# Patient Record
Sex: FEMALE | Race: WHITE | Employment: OTHER
[De-identification: names, ages, dates, MRNs, and addresses within clinical notes are randomized per-mention and may not be internally consistent; named-entity substitution may affect disease eponyms.]

---

## 2017-03-02 PROBLEM — Z79.899 MEDICATION MANAGEMENT: Status: ACTIVE | Noted: 2017-03-02

## 2017-03-02 PROBLEM — F34.1 DYSTHYMIA: Status: ACTIVE | Noted: 2017-03-02

## 2017-03-09 ENCOUNTER — OFFICE VISIT (OUTPATIENT)
Dept: ORTHOPEDIC SURGERY | Facility: CLINIC | Age: 72
End: 2017-03-09

## 2017-03-09 DIAGNOSIS — S32.000D COMPRESSION FX, LUMBAR SPINE, WITH ROUTINE HEALING, SUBSEQUENT ENCOUNTER: Primary | ICD-10-CM

## 2017-03-09 DIAGNOSIS — M81.0 OSTEOPOROSIS: ICD-10-CM

## 2017-03-09 PROCEDURE — 99213 OFFICE O/P EST LOW 20 MIN: CPT | Performed by: ORTHOPAEDIC SURGERY

## 2017-04-06 ENCOUNTER — HOSPITAL ENCOUNTER (INPATIENT)
Age: 72
LOS: 3 days | Discharge: HOME HEALTH | DRG: 378 | End: 2017-04-10
Attending: FAMILY MEDICINE | Admitting: FAMILY MEDICINE
Payer: COMMERCIAL

## 2017-04-06 LAB
ABO/RH: NORMAL
ALT SERPL-CCNC: 11 U/L (ref 5–33)
ANION GAP SERPL CALCULATED.3IONS-SCNC: 17 MMOL/L (ref 9–17)
ANTIBODY SCREEN: NEGATIVE
ARM BAND NUMBER: NORMAL
AST SERPL-CCNC: 19 U/L
BLOOD BANK COMMENT: NORMAL
BUN BLDV-MCNC: 10 MG/DL (ref 8–23)
BUN/CREAT BLD: ABNORMAL (ref 9–20)
C DIFFICILE TOXINS, PCR: NORMAL
CALCIUM SERPL-MCNC: 9.4 MG/DL (ref 8.6–10.4)
CHLORIDE BLD-SCNC: 89 MMOL/L (ref 98–107)
CO2: 23 MMOL/L (ref 20–31)
CREAT SERPL-MCNC: 0.51 MG/DL (ref 0.5–0.9)
DATE, STOOL #1: ABNORMAL
DATE, STOOL #2: ABNORMAL
DATE, STOOL #3: ABNORMAL
EXPIRATION DATE: NORMAL
GFR AFRICAN AMERICAN: >60 ML/MIN
GFR NON-AFRICAN AMERICAN: >60 ML/MIN
GFR SERPL CREATININE-BSD FRML MDRD: ABNORMAL ML/MIN/{1.73_M2}
GFR SERPL CREATININE-BSD FRML MDRD: ABNORMAL ML/MIN/{1.73_M2}
GLUCOSE BLD-MCNC: 207 MG/DL (ref 70–99)
GLUCOSE BLD-MCNC: 99 MG/DL (ref 65–105)
HCT VFR BLD CALC: 36.7 % (ref 36–46)
HEMOCCULT SP1 STL QL: POSITIVE
HEMOCCULT SP2 STL QL: ABNORMAL
HEMOCCULT SP3 STL QL: ABNORMAL
HEMOGLOBIN: 12.2 G/DL (ref 12–16)
MCH RBC QN AUTO: 35.1 PG (ref 26–34)
MCHC RBC AUTO-ENTMCNC: 33.3 G/DL (ref 31–37)
MCV RBC AUTO: 105.3 FL (ref 80–100)
PDW BLD-RTO: 13.8 % (ref 11.5–14.9)
PLATELET # BLD: 425 K/UL (ref 150–450)
PMV BLD AUTO: 6.9 FL (ref 6–12)
POTASSIUM SERPL-SCNC: 3.6 MMOL/L (ref 3.7–5.3)
RBC # BLD: 3.48 M/UL (ref 4–5.2)
SODIUM BLD-SCNC: 129 MMOL/L (ref 135–144)
SPECIMEN DESCRIPTION: NORMAL
TIME, STOOL #1: 1516
TIME, STOOL #2: ABNORMAL
TIME, STOOL #3: ABNORMAL
WBC # BLD: 8.6 K/UL (ref 3.5–11)

## 2017-04-06 PROCEDURE — 85027 COMPLETE CBC AUTOMATED: CPT

## 2017-04-06 PROCEDURE — 85018 HEMOGLOBIN: CPT

## 2017-04-06 PROCEDURE — 86850 RBC ANTIBODY SCREEN: CPT

## 2017-04-06 PROCEDURE — 96361 HYDRATE IV INFUSION ADD-ON: CPT

## 2017-04-06 PROCEDURE — 36415 COLL VENOUS BLD VENIPUNCTURE: CPT

## 2017-04-06 PROCEDURE — 96375 TX/PRO/DX INJ NEW DRUG ADDON: CPT

## 2017-04-06 PROCEDURE — 86900 BLOOD TYPING SEROLOGIC ABO: CPT

## 2017-04-06 PROCEDURE — 96365 THER/PROPH/DIAG IV INF INIT: CPT

## 2017-04-06 PROCEDURE — 2580000003 HC RX 258: Performed by: FAMILY MEDICINE

## 2017-04-06 PROCEDURE — 84450 TRANSFERASE (AST) (SGOT): CPT

## 2017-04-06 PROCEDURE — G0378 HOSPITAL OBSERVATION PER HR: HCPCS

## 2017-04-06 PROCEDURE — G0328 FECAL BLOOD SCRN IMMUNOASSAY: HCPCS

## 2017-04-06 PROCEDURE — 84460 ALANINE AMINO (ALT) (SGPT): CPT

## 2017-04-06 PROCEDURE — C9113 INJ PANTOPRAZOLE SODIUM, VIA: HCPCS | Performed by: FAMILY MEDICINE

## 2017-04-06 PROCEDURE — 87493 C DIFF AMPLIFIED PROBE: CPT

## 2017-04-06 PROCEDURE — 82947 ASSAY GLUCOSE BLOOD QUANT: CPT

## 2017-04-06 PROCEDURE — 80048 BASIC METABOLIC PNL TOTAL CA: CPT

## 2017-04-06 PROCEDURE — 6370000000 HC RX 637 (ALT 250 FOR IP): Performed by: FAMILY MEDICINE

## 2017-04-06 PROCEDURE — 6360000002 HC RX W HCPCS: Performed by: FAMILY MEDICINE

## 2017-04-06 PROCEDURE — 96376 TX/PRO/DX INJ SAME DRUG ADON: CPT

## 2017-04-06 PROCEDURE — 85014 HEMATOCRIT: CPT

## 2017-04-06 PROCEDURE — 96366 THER/PROPH/DIAG IV INF ADDON: CPT

## 2017-04-06 PROCEDURE — 86901 BLOOD TYPING SEROLOGIC RH(D): CPT

## 2017-04-06 PROCEDURE — G0379 DIRECT REFER HOSPITAL OBSERV: HCPCS

## 2017-04-06 RX ORDER — SODIUM CHLORIDE 9 MG/ML
INJECTION, SOLUTION INTRAVENOUS CONTINUOUS
Status: DISCONTINUED | OUTPATIENT
Start: 2017-04-06 | End: 2017-04-10 | Stop reason: HOSPADM

## 2017-04-06 RX ORDER — HYDROCODONE BITARTRATE AND ACETAMINOPHEN 5; 325 MG/1; MG/1
1 TABLET ORAL EVERY 4 HOURS PRN
Status: DISCONTINUED | OUTPATIENT
Start: 2017-04-06 | End: 2017-04-10 | Stop reason: HOSPADM

## 2017-04-06 RX ORDER — POTASSIUM CHLORIDE 7.45 MG/ML
10 INJECTION INTRAVENOUS
Status: COMPLETED | OUTPATIENT
Start: 2017-04-06 | End: 2017-04-06

## 2017-04-06 RX ORDER — CLONAZEPAM 0.5 MG/1
0.5 TABLET ORAL 3 TIMES DAILY
Status: DISCONTINUED | OUTPATIENT
Start: 2017-04-06 | End: 2017-04-10 | Stop reason: HOSPADM

## 2017-04-06 RX ORDER — PROPRANOLOL HYDROCHLORIDE 10 MG/1
10 TABLET ORAL DAILY
Status: DISCONTINUED | OUTPATIENT
Start: 2017-04-06 | End: 2017-04-10 | Stop reason: HOSPADM

## 2017-04-06 RX ORDER — 0.9 % SODIUM CHLORIDE 0.9 %
10 VIAL (ML) INJECTION DAILY
Status: DISCONTINUED | OUTPATIENT
Start: 2017-04-06 | End: 2017-04-07 | Stop reason: ALTCHOICE

## 2017-04-06 RX ORDER — PANTOPRAZOLE SODIUM 40 MG/10ML
40 INJECTION, POWDER, LYOPHILIZED, FOR SOLUTION INTRAVENOUS DAILY
Status: DISCONTINUED | OUTPATIENT
Start: 2017-04-06 | End: 2017-04-07 | Stop reason: ALTCHOICE

## 2017-04-06 RX ORDER — LEVOTHYROXINE SODIUM 0.05 MG/1
50 TABLET ORAL DAILY
Status: DISCONTINUED | OUTPATIENT
Start: 2017-04-06 | End: 2017-04-10 | Stop reason: HOSPADM

## 2017-04-06 RX ORDER — CITALOPRAM 40 MG/1
40 TABLET ORAL DAILY
Status: DISCONTINUED | OUTPATIENT
Start: 2017-04-06 | End: 2017-04-10 | Stop reason: HOSPADM

## 2017-04-06 RX ORDER — PANTOPRAZOLE SODIUM 40 MG/1
40 TABLET, DELAYED RELEASE ORAL
Status: DISCONTINUED | OUTPATIENT
Start: 2017-04-07 | End: 2017-04-06 | Stop reason: SDUPTHER

## 2017-04-06 RX ORDER — DEXTROSE AND SODIUM CHLORIDE 5; .9 G/100ML; G/100ML
INJECTION, SOLUTION INTRAVENOUS CONTINUOUS
Status: DISCONTINUED | OUTPATIENT
Start: 2017-04-06 | End: 2017-04-06

## 2017-04-06 RX ADMIN — POTASSIUM CHLORIDE 10 MEQ: 7.46 INJECTION, SOLUTION INTRAVENOUS at 20:16

## 2017-04-06 RX ADMIN — SODIUM CHLORIDE: 9 INJECTION, SOLUTION INTRAVENOUS at 18:42

## 2017-04-06 RX ADMIN — POTASSIUM CHLORIDE 10 MEQ: 7.46 INJECTION, SOLUTION INTRAVENOUS at 22:01

## 2017-04-06 RX ADMIN — DEXTROSE AND SODIUM CHLORIDE: 5; 900 INJECTION, SOLUTION INTRAVENOUS at 14:01

## 2017-04-06 RX ADMIN — PANTOPRAZOLE SODIUM 40 MG: 40 INJECTION, POWDER, FOR SOLUTION INTRAVENOUS at 22:02

## 2017-04-06 RX ADMIN — SODIUM CHLORIDE 10 ML: 9 INJECTION INTRAMUSCULAR; INTRAVENOUS; SUBCUTANEOUS at 22:02

## 2017-04-06 RX ADMIN — CLONAZEPAM 0.5 MG: 0.5 TABLET ORAL at 22:02

## 2017-04-06 RX ADMIN — HYDROCODONE BITARTRATE AND ACETAMINOPHEN 1 TABLET: 5; 325 TABLET ORAL at 15:10

## 2017-04-06 RX ADMIN — PROPRANOLOL HYDROCHLORIDE 10 MG: 10 TABLET ORAL at 20:17

## 2017-04-06 ASSESSMENT — PAIN DESCRIPTION - PROGRESSION
CLINICAL_PROGRESSION: NOT CHANGED

## 2017-04-06 ASSESSMENT — PAIN DESCRIPTION - FREQUENCY: FREQUENCY: CONTINUOUS

## 2017-04-06 ASSESSMENT — PAIN DESCRIPTION - DESCRIPTORS: DESCRIPTORS: ACHING

## 2017-04-06 ASSESSMENT — PAIN DESCRIPTION - ONSET: ONSET: ON-GOING

## 2017-04-06 ASSESSMENT — PAIN DESCRIPTION - ORIENTATION: ORIENTATION: LOWER;MID

## 2017-04-06 ASSESSMENT — PAIN DESCRIPTION - LOCATION: LOCATION: ABDOMEN;BACK

## 2017-04-06 ASSESSMENT — PAIN DESCRIPTION - PAIN TYPE: TYPE: CHRONIC PAIN;ACUTE PAIN

## 2017-04-06 ASSESSMENT — PAIN SCALES - GENERAL
PAINLEVEL_OUTOF10: 9
PAINLEVEL_OUTOF10: 7
PAINLEVEL_OUTOF10: 9

## 2017-04-06 NOTE — IP AVS SNAPSHOT
medications prescribed for you. Read the directions carefully, and ask your doctor or other care provider to review them with you.       ASK your doctor about these medications     Lift Chair Misc   by Does not apply route

## 2017-04-06 NOTE — IP AVS SNAPSHOT
Commonly known as:  PROVENTIL HFA   Inhale 1-2 puffs into the lungs every 4 hours as needed for Wheezing or Shortness of Breath (Space out to every 6 hours as symptoms improve) Space out to every 6 hours as symptoms improve. As needed                        citalopram 40 MG tablet   Commonly known as:  CELEXA   TAKE ONE TABLET BY MOUTH EVERY MORNING                40 mg on 4/10/2017  2:12 PM     Tomorrow morning                          clonazePAM 0.5 MG tablet   Commonly known as:  KLONOPIN   Take 1 tablet by mouth 3 times daily                0.5 mg on 4/9/2017 10:16 PM     Resume home regimen                         folic acid 1 MG tablet   Commonly known as:  FOLVITE   Take 1 mg by mouth daily                  Tomorrow morning                          levothyroxine 50 MCG tablet   Commonly known as:  SYNTHROID   TAKE ONE TABLET BY MOUTH DAILY                50 mcg on 4/9/2017  6:11 AM     Tomorrow morning                          nicotine 21 MG/24HR   Commonly known as:  NICODERM CQ   Place 1 patch onto the skin every 24 hours                  Tomorrow morning                          nicotine 14 MG/24HR   Commonly known as:  NICODERM CQ   Place 1 patch onto the skin every 24 hours                  Tomorrow morning                          nicotine 7 MG/24HR   Commonly known as:  NICODERM CQ   Place 1 patch onto the skin every 24 hours   Notes to Patient:  Always remove previous patch first and rotate sites.                   Tomorrow morning                          propranolol 10 MG tablet   Commonly known as:  INDERAL   TAKE ONE TABLET BY MOUTH DAILY                10 mg on 4/10/2017  7:23 AM     Tomorrow morning                          vitamin B-12 1000 MCG tablet   Commonly known as:  CYANOCOBALAMIN   Take 1,000 mcg by mouth daily                  Tomorrow morning                          Vitamin D 1000 UNITS Caps capsule   Commonly known as:  CHOLECALCIFEROL You may receive a survey regarding the care you received during your stay. Your input is valuable to us. We encourage you to complete and return your survey in the envelope provided. We hope you will choose us in the future for your healthcare needs. Patient Information     Patient Name DAYA Olivarez 1945      Care Provided at:     Name Address Phone       Jasen Peterson  [address] [phone]            Your Visit    Here you will find information about your visit, including the reason for your visit. Please take this sheet with you when you visit your doctor or other health care provider in the future. It will help determine the best possible medical care for you at that time. If you have any questions once you leave the hospital, please call the department phone number listed below. Why you were here     Your primary diagnosis was:  Hematochezia    Your diagnoses also included:  Diarrhea, Dehydration, Anemia Associated With Acute Blood Loss, Hyponatremia, Hypokalemia      Visit Information     Date & Time Provider Department Dept. Phone    2017 Yris Allen MD [facility] [phone]       Follow-up Appointments    Below is a list of your follow-up and future appointments. This may not be a complete list as you may have made appointments directly with providers that we are not aware of or your providers may have made some for you. Please call your providers to confirm appointments. It is important to keep your appointments. Please bring your current insurance card, photo ID, co-pay, and all medication bottles to your appointment. If self-pay, payment is expected at the time of service. Follow-up Information     Follow up with Yris Allen MD. Schedule an appointment as soon as possible for a visit in 1 week. Specialty:  Family Medicine    Contact information:    [address]   [address] Other Treatments: skilled assessment, medication education, RESUME ALL PREVIOUS SERVICES, RESUME ALL [facility] CARE    Patient's personal belongings (please select all that are sent with patient):  Glasses    RN SIGNATURE:  Electronically signed by Mecca Miguel RN on 4/8/17 at 10:37 AM    PHYSICIAN SECTION    Prognosis: Fair    Condition at Discharge: Stable    Rehab Potential (if transferring to Rehab): Fair    Recommended Labs or Other Treatments After Discharge: see above    Physician Certification: I certify the above information and transfer of Rhonda Haro  is necessary for the continuing treatment of the diagnosis listed and that she requires Home Care for less 30 days. Update Admission H&P: No change in H&P    PHYSICIAN SIGNATURE:  Electronically signed by Nata Interiano RN on 4/10/17 at 2:49 PM    CASE MANAGEMENT/SOCIAL WORK SECTION    Inpatient Status Date: 4/7/17    [facility] Readmission Risk Assessment Score:  Risk Score: 24   (Score > 14= high risk for readmission)    Discharging to Facility/ [address]  Phone: [phone]  · Fax: [phone]    / signature: Electronically signed by Dennie Evener, RN on 4/10/17 at 3:18 PM      Diet Instructions     ? Good nutrition is important when healing from an illness, injury, or surgery. Follow any nutrition recommendations given to you during your hospital stay. ? If you were given an oral nutrition supplement while in the hospital, continue to take this supplement at home. You can take it with meals, in-between meals, and/or before bedtime. These supplements can be purchased at most local grocery stores, pharmacies, and chain [employer]-stores. ? If you have any questions about your diet or nutrition, call the hospital and ask for the dietitian.     General Diet           Activity Instructions     Activity as tolerated            Important information for a smoker

## 2017-04-06 NOTE — PLAN OF CARE
Problem: Falls - Risk of  Goal: Absence of falls  Outcome: Met This Shift  Intervention completed as charted. Pt remains injury free and free of falls. Pt uses call light correctly. Pt has a steady gait    Problem: Risk for Impaired Skin Integrity  Goal: Tissue integrity - skin and mucous membranes  Structural intactness and normal physiological function of skin and  mucous membranes.    Outcome: Met This Shift

## 2017-04-07 PROBLEM — E87.1 HYPONATREMIA: Status: ACTIVE | Noted: 2017-04-07

## 2017-04-07 PROBLEM — R19.7 DIARRHEA: Status: ACTIVE | Noted: 2017-04-07

## 2017-04-07 PROBLEM — D62 ANEMIA ASSOCIATED WITH ACUTE BLOOD LOSS: Status: ACTIVE | Noted: 2017-04-07

## 2017-04-07 PROBLEM — E87.6 HYPOKALEMIA: Status: ACTIVE | Noted: 2017-04-07

## 2017-04-07 PROBLEM — K92.1 HEMATOCHEZIA: Chronic | Status: ACTIVE | Noted: 2017-04-07

## 2017-04-07 PROBLEM — E86.0 DEHYDRATION: Status: ACTIVE | Noted: 2017-04-07

## 2017-04-07 LAB
ANION GAP SERPL CALCULATED.3IONS-SCNC: 13 MMOL/L (ref 9–17)
CHLORIDE BLD-SCNC: 96 MMOL/L (ref 98–107)
CO2: 21 MMOL/L (ref 20–31)
GLUCOSE BLD-MCNC: 135 MG/DL (ref 65–105)
GLUCOSE BLD-MCNC: 89 MG/DL (ref 65–105)
GLUCOSE BLD-MCNC: 90 MG/DL (ref 65–105)
HCT VFR BLD CALC: 29.8 % (ref 36–46)
HCT VFR BLD CALC: 30.2 % (ref 36–46)
HCT VFR BLD CALC: 31.1 % (ref 36–46)
HCT VFR BLD CALC: 31.4 % (ref 36–46)
HEMOGLOBIN: 10.1 G/DL (ref 12–16)
HEMOGLOBIN: 10.2 G/DL (ref 12–16)
HEMOGLOBIN: 10.4 G/DL (ref 12–16)
HEMOGLOBIN: 10.6 G/DL (ref 12–16)
POTASSIUM SERPL-SCNC: 3.9 MMOL/L (ref 3.7–5.3)
SODIUM BLD-SCNC: 130 MMOL/L (ref 135–144)

## 2017-04-07 PROCEDURE — C9113 INJ PANTOPRAZOLE SODIUM, VIA: HCPCS | Performed by: FAMILY MEDICINE

## 2017-04-07 PROCEDURE — 6370000000 HC RX 637 (ALT 250 FOR IP): Performed by: FAMILY MEDICINE

## 2017-04-07 PROCEDURE — 2580000003 HC RX 258: Performed by: FAMILY MEDICINE

## 2017-04-07 PROCEDURE — G8979 MOBILITY GOAL STATUS: HCPCS

## 2017-04-07 PROCEDURE — G8978 MOBILITY CURRENT STATUS: HCPCS

## 2017-04-07 PROCEDURE — 85018 HEMOGLOBIN: CPT

## 2017-04-07 PROCEDURE — 82947 ASSAY GLUCOSE BLOOD QUANT: CPT

## 2017-04-07 PROCEDURE — 1200000000 HC SEMI PRIVATE

## 2017-04-07 PROCEDURE — 97161 PT EVAL LOW COMPLEX 20 MIN: CPT

## 2017-04-07 PROCEDURE — G8987 SELF CARE CURRENT STATUS: HCPCS

## 2017-04-07 PROCEDURE — 99222 1ST HOSP IP/OBS MODERATE 55: CPT | Performed by: FAMILY MEDICINE

## 2017-04-07 PROCEDURE — 36415 COLL VENOUS BLD VENIPUNCTURE: CPT

## 2017-04-07 PROCEDURE — 97166 OT EVAL MOD COMPLEX 45 MIN: CPT

## 2017-04-07 PROCEDURE — 97110 THERAPEUTIC EXERCISES: CPT

## 2017-04-07 PROCEDURE — G8988 SELF CARE GOAL STATUS: HCPCS

## 2017-04-07 PROCEDURE — 80051 ELECTROLYTE PANEL: CPT

## 2017-04-07 PROCEDURE — 96361 HYDRATE IV INFUSION ADD-ON: CPT

## 2017-04-07 PROCEDURE — 6360000002 HC RX W HCPCS: Performed by: FAMILY MEDICINE

## 2017-04-07 PROCEDURE — 97535 SELF CARE MNGMENT TRAINING: CPT

## 2017-04-07 PROCEDURE — 85014 HEMATOCRIT: CPT

## 2017-04-07 PROCEDURE — 96376 TX/PRO/DX INJ SAME DRUG ADON: CPT

## 2017-04-07 PROCEDURE — 99222 1ST HOSP IP/OBS MODERATE 55: CPT | Performed by: INTERNAL MEDICINE

## 2017-04-07 PROCEDURE — G0378 HOSPITAL OBSERVATION PER HR: HCPCS

## 2017-04-07 RX ORDER — PANTOPRAZOLE SODIUM 40 MG/1
40 TABLET, DELAYED RELEASE ORAL
Status: DISCONTINUED | OUTPATIENT
Start: 2017-04-08 | End: 2017-04-10 | Stop reason: HOSPADM

## 2017-04-07 RX ADMIN — CLONAZEPAM 0.5 MG: 0.5 TABLET ORAL at 21:23

## 2017-04-07 RX ADMIN — HYDROCODONE BITARTRATE AND ACETAMINOPHEN 1 TABLET: 5; 325 TABLET ORAL at 05:40

## 2017-04-07 RX ADMIN — PROPRANOLOL HYDROCHLORIDE 10 MG: 10 TABLET ORAL at 09:34

## 2017-04-07 RX ADMIN — PANTOPRAZOLE SODIUM 40 MG: 40 INJECTION, POWDER, FOR SOLUTION INTRAVENOUS at 14:59

## 2017-04-07 RX ADMIN — SODIUM CHLORIDE 10 ML: 9 INJECTION INTRAMUSCULAR; INTRAVENOUS; SUBCUTANEOUS at 14:59

## 2017-04-07 RX ADMIN — SODIUM CHLORIDE: 9 INJECTION, SOLUTION INTRAVENOUS at 23:06

## 2017-04-07 RX ADMIN — CITALOPRAM 40 MG: 40 TABLET ORAL at 09:34

## 2017-04-07 RX ADMIN — CLONAZEPAM 0.5 MG: 0.5 TABLET ORAL at 09:34

## 2017-04-07 RX ADMIN — SODIUM CHLORIDE: 9 INJECTION, SOLUTION INTRAVENOUS at 14:59

## 2017-04-07 RX ADMIN — HYDROCODONE BITARTRATE AND ACETAMINOPHEN 1 TABLET: 5; 325 TABLET ORAL at 14:59

## 2017-04-07 RX ADMIN — CLONAZEPAM 0.5 MG: 0.5 TABLET ORAL at 16:50

## 2017-04-07 RX ADMIN — LEVOTHYROXINE SODIUM 50 MCG: 50 TABLET ORAL at 05:41

## 2017-04-07 ASSESSMENT — PAIN DESCRIPTION - DESCRIPTORS
DESCRIPTORS: ACHING
DESCRIPTORS: SHARP

## 2017-04-07 ASSESSMENT — PAIN SCALES - GENERAL
PAINLEVEL_OUTOF10: 6
PAINLEVEL_OUTOF10: 7
PAINLEVEL_OUTOF10: 7
PAINLEVEL_OUTOF10: 8
PAINLEVEL_OUTOF10: 6

## 2017-04-07 ASSESSMENT — PAIN DESCRIPTION - ORIENTATION
ORIENTATION: LOWER
ORIENTATION: LOWER

## 2017-04-07 ASSESSMENT — PAIN DESCRIPTION - LOCATION
LOCATION: BACK

## 2017-04-07 ASSESSMENT — PAIN DESCRIPTION - PAIN TYPE
TYPE: CHRONIC PAIN

## 2017-04-07 NOTE — FLOWSHEET NOTE
04/06/17 1900   Encounter Summary   Services provided to: Patient and family together   Referral/Consult From: [facility];Family members;Friends/neighbors   Continue Visiting (4/6/17)   Complexity of Encounter Low   Length of Encounter 15 minutes   Routine   Type Initial   Assessment Approachable; Hopeful;Coping   Intervention Active listening;Explored feelings, thoughts, concerns;Nurtured hope;Prayer;Sustaining presence/ Ministry of presence   Outcome Expressed gratitude;Engaged in conversation; Shared life review;Coping;Receptive   Visited by Srikanth Coronado

## 2017-04-07 NOTE — PROGRESS NOTES
Progress Note    4/7/2017 7:49 AM  Subjective: Interval History: Patient has been having intermittent rectal bleeding x 6 weeks, worse this past week and having diarrhea and decreased oral intake. Feeling weaker yesterday when in the office and was admitted for GI bleeding. Diet: DIET FULL LIQUID;    Medications:   Reviewed medications    Labs:   CBC:   Recent Labs      04/06/17   1502   04/07/17   0621   WBC  8.6   --    --    HGB  12.2   < >  10.6*   PLT  425   --    --     < > = values in this interval not displayed. BMP:    Recent Labs      04/06/17   1502  04/07/17   0621   NA  129*  130*   K  3.6*  3.9   CL  89*  96*   CO2  23  21   BUN  10   --    CREATININE  0.51   --    GLUCOSE  207*   --      INR: No results for input(s): INR in the last 72 hours. Objective:   Vitals:   Visit Vitals    /66    Pulse 63    Temp 97.2 °F (36.2 °C) (Oral)    Resp 16    Ht 5' 2.5\" (1.588 m)    Wt 132 lb 4.4 oz (60 kg)    SpO2 97%    BMI 23.81 kg/m2     General appearance: alert and cooperative with exam  Neck: no adenopathy, no carotid bruit and thyroid not enlarged, symmetric, no tenderness/mass/nodules  Lungs: clear to auscultation bilaterally  Heart: regular rate and rhythm, S1, S2 normal, no murmur, click, rub or gallop  Abdomen: soft, non-tender; bowel sounds normal; no masses,  no organomegaly  Extremities: extremities normal, atraumatic, no cyanosis or edema  Neurologic: Mental status: Alert, oriented, thought content appropriate    Assessment and Plan:    Hematochezia   anemia from GI bleed  Diarrhea  Weakness    P: iv fluids, monitor labs: has decreased hemoglobin by 2 grams. GI consult. Monitor electrolytes.      Patient Active Problem List:     Anorexia     Arthritis     Hypothyroidism     Memory loss     Menopause     Muscle weakness     Tremor     Hypothyroidism     Chronic pain     Compressed spine fracture (HCC)     Closed fracture of second lumbar vertebra (HCC)     Closed fracture

## 2017-04-07 NOTE — CONSULTS
Authorizing Provider Emily Tompkins MD    Medication folic acid (FOLVITE) 1 MG tablet, Sig Take 1 mg by mouth daily, Start Date , End Date , Taking? Yes, Authorizing Provider Emily Tompkins MD    Medication nicotine (NICODERM CQ) 21 MG/24HR, Sig Place 1 patch onto the skin every 24 hours, Start Date 2/3/17, End Date 2/3/18, Taking? , Authorizing Provider Zenia Bates MD    Medication nicotine (NICODERM CQ) 14 MG/24HR, Sig Place 1 patch onto the skin every 24 hours, Start Date 2/3/17, End Date 2/3/18, Taking? , Authorizing Provider Zenia Bates MD    Medication nicotine (NICODERM CQ) 7 MG/24HR, Sig Place 1 patch onto the skin every 24 hours, Start Date 2/3/17, End Date 2/3/18, Taking? , Authorizing Provider Zenia Bates MD    Medication Lift Chair Sirisha Kenney by Does not apply route, Start Date 7/1/16, End Date , Taking? , Authorizing Provider Zenia Bates MD    Medication albuterol sulfate HFA (PROVENTIL HFA) 108 (90 BASE) MCG/ACT inhaler, Sig Inhale 1-2 puffs into the lungs every 4 hours as needed for Wheezing or Shortness of Breath (Space out to every 6 hours as symptoms improve) Space out to every 6 hours as symptoms improve., Start Date 1/7/16, End Date , Taking? , Authorizing Provider Tavo Champagne DO         Allergies:      Bactrim (sulfamethoxazole-trimethoprim); Sulfa antibiotics; and Motrin (ibuprofen)    Social History:    Tobacco:    reports that she has been smoking Cigarettes. She has a 50.00 pack-year smoking history. She has never used smokeless tobacco.  Alcohol:      reports that she drinks about 4.2 oz of alcohol per week   Drug Use:  reports that she does not use illicit drugs.     Family History:    Review of patient's family history indicates:    Heart Disease                  Mother                    Asthma                         Mother                    Heart Disease                  Father                      Review of Systems:    Positive and Negative as Component                Value               Date                       WBC                      8.6                 04/06/2017                 RBC                      3.48                04/06/2017                 HGB                      10.2                04/07/2017                 HCT                      29.8                04/07/2017                 MCV                      105.3               04/06/2017                 MCH                      35.1                04/06/2017                 MCHC                     33.3                04/06/2017                 RDW                      13.8                04/06/2017                 PLT                      425                 04/06/2017                 MPV                      6.9                 04/06/2017            CBC with Differential:  Lab Results       Component                Value               Date                       WBC                      8.6                 04/06/2017                 RBC                      3.48                04/06/2017                 HGB                      10.2                04/07/2017                 HCT                      29.8                04/07/2017                 PLT                      425                 04/06/2017                 MCV                      105.3               04/06/2017                 MCH                      35.1                04/06/2017                 MCHC                     33.3                04/06/2017                 RDW                      13.8                04/06/2017                 LYMPHOPCT                31                  10/18/2016                 MONOPCT                  9                   10/18/2016                 BASOPCT                  1                   10/18/2016                 MONOSABS                 0.50                10/18/2016                 LYMPHSABS                1.90                10/18/2016                 EOSABS                   0. 10 10/18/2016                 BASOSABS                 0. 10                10/18/2016                 DIFFTYPE                 NOT REPORTED        10/18/2016            Hemoglobin/Hematocrit:  Lab Results       Component                Value               Date                       HGB                      10.2                04/07/2017                 HCT                      29.8                04/07/2017            CMP:  Lab Results       Component                Value               Date                       NA                       130                 04/07/2017                 K                        3.9                 04/07/2017                 CL                       96                  04/07/2017                 CO2                      21                  04/07/2017                 BUN                      10                  04/06/2017                 CREATININE               0.51                04/06/2017                 GFRAA                    >60                 04/06/2017                 LABGLOM                  >60                 04/06/2017                 GLUCOSE                  207                 04/06/2017                 GLUCOSE                  91                  07/29/2016                 CALCIUM                  9.4                 04/06/2017                 AST                      19                  04/06/2017                 ALT                      11                  04/06/2017            BMP:  Lab Results       Component                Value               Date                       NA                       130                 04/07/2017                 K                        3.9                 04/07/2017                 CL                       96                  04/07/2017                 CO2                      21                  04/07/2017                 BUN                      10                  04/06/2017                 CREATININE               0.51

## 2017-04-07 NOTE — PROGRESS NOTES
[address]   Occupational Therapy Evaluation  Date: 17  Patient Name: Coleman Saleem       Room:   MRN: [id number]  Account: [de-identified]   : 1945  (75 y.o.) Gender: female        Diagnosis: GI bleed, anemia, 4-7 Hgn 10.6     Patient Active Problem List   Diagnosis    Anorexia    Arthritis    Hypothyroidism    Memory loss    Menopause    Muscle weakness    Tremor    Hypothyroidism    Chronic pain    Compressed spine fracture (HCC)    Closed fracture of second lumbar vertebra (HCC)    Closed fracture of ninth thoracic vertebra ([address]    Menopausal osteoporosis    [facility]    Compression fx, lumbar spine ([address]    Osteoporosis    Dysthymia    Medication management    Hematochezia    Diarrhea    Dehydration    Anemia associated with acute blood loss    Hyponatremia    Hypokalemia          Past Medical History:  has a past medical history of Alcoholism ([facility] Anxiety; Cataract; Chronic pain; Depression; Hypothyroid; Osteoporosis; and Tobacco abuse. Past Surgical History:   has a past surgical history that includes hernia repair; Hysterectomy; Carpal tunnel release; Shoulder arthroscopy; Cataract removal; Tubal ligation; and Kyphosis surgery (10/27/2016). Restrictions  Restrictions/Precautions: Fall Risk     Vitals  Temp: 97.8 °F (36.6 °C)  Pulse: 76  Resp: 16  BP: 117/76  Height: 5' 2.5\" (158.8 cm)  Weight: 132 lb 4.4 oz (60 kg)  BMI (Calculated): 23.9  Oxygen Therapy  SpO2: 100 %  O2 Device: None (Room air)  Level of Consciousness: Alert    Subjective  Subjective: \"I've always been active. \"  pt notes adjusting to being less active and having HHA to assist with advanced adls, pt demo good ECWS with pacing, sitting to do cooking prep, etc     Vision  Vision Exceptions: Wears glasses for reading  Hearing  Hearing: Within functional limits  Social/Functional History  Lives With: Spouse  Type of Home: House  Home Access: Stairs to enter with rails  Entrance Stairs - Number of Steps: 3  Entrance Stairs - Rails: Both  Bathroom Shower/Tub: Tub/Shower unit  Bathroom Equipment: Grab bars in shower, Shower chair  Home Equipment: 4 wheeled walker, [employer] From: Home health  ADL Assistance: Needs assistance  Homemaking Assistance: Needs assistance  Homemaking Responsibilities: Yes  Ambulation Assistance: Independent  Transfer Assistance: Independent  Active : Yes  IADL Comments: spouse works, pt has HHA x 5 day / week x 2 hours / day, spouse does laundry- 3 steps down, pt does cooking,is assisted with remainder advanced adls,  plays games on computer, used to sew but is now unable- poor coordination, indep toileting, amb with 4ww, can dress self, A with shower  Additional Comments: pt sleeps in recliner    Objective      Cognition  Overall Cognitive Status: WFL       ADL  Feeding: Independent  Grooming: Setup  UE Bathing: Setup  LE Bathing: Contact guard assistance  UE Dressing: Setup  LE Dressing: Contact guard assistance  Toileting: Contact guard assistance  Additional Comments: pt is able to reach feet for LE self care    UE Function           LUE Strength  Gross LUE Strength: Exceptions to [facility]  L Shoulder Flex: 3+/5  L Elbow Flex: 3+/5  L Elbow Ext: 3+/5  L Hand Grasp: 3+/5  L Hand Release: 3+/5           LUE AROM (degrees)  LUE AROM : WFL        RUE Strength  Gross RUE Strength: WFL            RUE AROM (degrees)  RUE AROM : WFL          Fine Motor Skills  Coordination  Movements Are Fluid And Coordinated: No  Coordination and Movement description: Fine motor impairments, Left UE   Comment: pt notes spasms L hand with hand clenching, also decreased coordination and weakness, not new this admit but no apparent diagnosis, is on meds for  spasms               Quality of Movement Other  Comment: pt notes spasms L hand with hand clenching, also decreased coordination and weakness, not new this admit but no apparent diagnosis, is on meds for

## 2017-04-07 NOTE — H&P
HISTORY and [id number]         NAME:  Ramakrishna Sykes  MRN: [id number]   YOB: 1945   Date: 4/7/2017   Age: 70 y.o. Gender: female       COMPLAINT AND PRESENT HISTORY:     Ramakrishna Sykes is 70 y.o.,   female, had GI Bleeding Black one week ago,later became red. Pt has been having Diarrhea vomiting for the last 6 weeks. Pt has upper abd. Pains and umbilical areas sharp in character radiates to the back. Patient denies any other GI symptoms. No heartburn. No fever or chills. DIAGNOSTIC RESULTS   Radiology:       Labs:  CBC:   Recent Labs      04/06/17   1502   04/07/17   0621  04/07/17   1115  04/07/17   1949   WBC  8.6   --    --    --    --    HGB  12.2   < >  10.6*  10.2*  10.1*   PLT  425   --    --    --    --     < > = values in this interval not displayed. BMP:    Recent Labs      04/06/17   1502  04/07/17   0621   NA  129*  130*   K  3.6*  3.9   CL  89*  96*   CO2  23  21   BUN  10   --    CREATININE  0.51   --    GLUCOSE  207*   --      Hepatic:   Recent Labs      04/06/17   1502   AST  19   ALT  11       PAST MEDICAL HISTORY     Past Medical History   Diagnosis Date    [facility]ism ([facility] 10/20/2016    Anxiety     Cataract     Chronic pain     Depression     Hypothyroid     Osteoporosis     Tobacco abuse      Pt denies any history of Diabetes mellitus type 2, hypertension, stroke, heart disease, COPD, Asthma, GERD, HLD, Cancer, Seizures, Kidney Disease, Hepatitis, TB Substance abuse.     SURGICAL HISTORY       Past Surgical History   Procedure Laterality Date    Hernia repair      Hysterectomy      Carpal tunnel release      Shoulder arthroscopy      Cataract removal      Tubal ligation      Kyphosis surgery  10/27/2016     kyphoplasty L1 and L5       FAMILY HISTORY       Family History   Problem Relation Age of Onset    Heart Disease Mother     Asthma Mother     Heart Disease Father        SOCIAL HISTORY       Social History

## 2017-04-07 NOTE — CARE COORDINATION
ONGOING DISCHARGE PLAN:    Spoke with patient regarding discharge plan and patient confirms that plan is to return home with vns  jacqueline started  Here with rectal bleed, diarrhea, weak  Hgb 4/6- 12.2, today Hgb 10.6  GI to see    Will continue to follow for additional discharge needs.     Electronically signed by Lord Roc RN on 4/7/2017 at 12:27 PM

## 2017-04-08 ENCOUNTER — APPOINTMENT (OUTPATIENT)
Dept: CT IMAGING | Age: 72
DRG: 378 | End: 2017-04-08
Attending: FAMILY MEDICINE
Payer: COMMERCIAL

## 2017-04-08 LAB
ALBUMIN SERPL-MCNC: 3.8 G/DL (ref 3.5–5.2)
ALBUMIN/GLOBULIN RATIO: ABNORMAL (ref 1–2.5)
ALP BLD-CCNC: 60 U/L (ref 35–104)
ALT SERPL-CCNC: 8 U/L (ref 5–33)
AMYLASE: 142 U/L (ref 28–100)
ANION GAP SERPL CALCULATED.3IONS-SCNC: 13 MMOL/L (ref 9–17)
AST SERPL-CCNC: 13 U/L
BILIRUB SERPL-MCNC: 0.19 MG/DL (ref 0.3–1.2)
BILIRUBIN DIRECT: 0.12 MG/DL
BILIRUBIN, INDIRECT: 0.07 MG/DL (ref 0–1)
BUN BLDV-MCNC: 7 MG/DL (ref 8–23)
BUN/CREAT BLD: ABNORMAL (ref 9–20)
CALCIUM SERPL-MCNC: 8.8 MG/DL (ref 8.6–10.4)
CHLORIDE BLD-SCNC: 99 MMOL/L (ref 98–107)
CO2: 22 MMOL/L (ref 20–31)
CREAT SERPL-MCNC: 0.47 MG/DL (ref 0.5–0.9)
GFR AFRICAN AMERICAN: >60 ML/MIN
GFR NON-AFRICAN AMERICAN: >60 ML/MIN
GFR SERPL CREATININE-BSD FRML MDRD: ABNORMAL ML/MIN/{1.73_M2}
GFR SERPL CREATININE-BSD FRML MDRD: ABNORMAL ML/MIN/{1.73_M2}
GLOBULIN: ABNORMAL G/DL (ref 1.5–3.8)
GLUCOSE BLD-MCNC: 92 MG/DL (ref 70–99)
HCT VFR BLD CALC: 29.3 % (ref 36–46)
HCT VFR BLD CALC: 31.7 % (ref 36–46)
HEMOGLOBIN: 10.8 G/DL (ref 12–16)
HEMOGLOBIN: 9.8 G/DL (ref 12–16)
LIPASE: 221 U/L (ref 13–60)
POTASSIUM SERPL-SCNC: 4.2 MMOL/L (ref 3.7–5.3)
SODIUM BLD-SCNC: 134 MMOL/L (ref 135–144)
TOTAL PROTEIN: 5.9 G/DL (ref 6.4–8.3)

## 2017-04-08 PROCEDURE — G0378 HOSPITAL OBSERVATION PER HR: HCPCS

## 2017-04-08 PROCEDURE — 80076 HEPATIC FUNCTION PANEL: CPT

## 2017-04-08 PROCEDURE — 82150 ASSAY OF AMYLASE: CPT

## 2017-04-08 PROCEDURE — 96361 HYDRATE IV INFUSION ADD-ON: CPT

## 2017-04-08 PROCEDURE — 74177 CT ABD & PELVIS W/CONTRAST: CPT

## 2017-04-08 PROCEDURE — 83690 ASSAY OF LIPASE: CPT

## 2017-04-08 PROCEDURE — 6360000004 HC RX CONTRAST MEDICATION: Performed by: FAMILY MEDICINE

## 2017-04-08 PROCEDURE — 99232 SBSQ HOSP IP/OBS MODERATE 35: CPT | Performed by: FAMILY MEDICINE

## 2017-04-08 PROCEDURE — 36415 COLL VENOUS BLD VENIPUNCTURE: CPT

## 2017-04-08 PROCEDURE — 85014 HEMATOCRIT: CPT

## 2017-04-08 PROCEDURE — 99232 SBSQ HOSP IP/OBS MODERATE 35: CPT | Performed by: INTERNAL MEDICINE

## 2017-04-08 PROCEDURE — 2580000003 HC RX 258: Performed by: FAMILY MEDICINE

## 2017-04-08 PROCEDURE — 6370000000 HC RX 637 (ALT 250 FOR IP): Performed by: FAMILY MEDICINE

## 2017-04-08 PROCEDURE — 1200000000 HC SEMI PRIVATE

## 2017-04-08 PROCEDURE — 96376 TX/PRO/DX INJ SAME DRUG ADON: CPT

## 2017-04-08 PROCEDURE — 85018 HEMOGLOBIN: CPT

## 2017-04-08 PROCEDURE — 80048 BASIC METABOLIC PNL TOTAL CA: CPT

## 2017-04-08 PROCEDURE — 6360000004 HC RX CONTRAST MEDICATION

## 2017-04-08 RX ORDER — DOCUSATE SODIUM 100 MG/1
100 CAPSULE, LIQUID FILLED ORAL 2 TIMES DAILY PRN
Status: DISCONTINUED | OUTPATIENT
Start: 2017-04-08 | End: 2017-04-10 | Stop reason: HOSPADM

## 2017-04-08 RX ORDER — SODIUM CHLORIDE 0.9 % (FLUSH) 0.9 %
10 SYRINGE (ML) INJECTION PRN
Status: DISCONTINUED | OUTPATIENT
Start: 2017-04-08 | End: 2017-04-10 | Stop reason: HOSPADM

## 2017-04-08 RX ORDER — POLYETHYLENE GLYCOL 3350 17 G/17G
250 POWDER, FOR SOLUTION ORAL ONCE
Status: DISCONTINUED | OUTPATIENT
Start: 2017-04-09 | End: 2017-04-10

## 2017-04-08 RX ORDER — 0.9 % SODIUM CHLORIDE 0.9 %
100 INTRAVENOUS SOLUTION INTRAVENOUS ONCE
Status: COMPLETED | OUTPATIENT
Start: 2017-04-08 | End: 2017-04-08

## 2017-04-08 RX ADMIN — IOVERSOL 130 ML: 741 INJECTION INTRA-ARTERIAL; INTRAVENOUS at 12:51

## 2017-04-08 RX ADMIN — Medication 10 ML: at 12:51

## 2017-04-08 RX ADMIN — HYDROCODONE BITARTRATE AND ACETAMINOPHEN 1 TABLET: 5; 325 TABLET ORAL at 19:13

## 2017-04-08 RX ADMIN — CITALOPRAM 40 MG: 40 TABLET ORAL at 07:44

## 2017-04-08 RX ADMIN — DOCUSATE SODIUM 100 MG: 100 CAPSULE, LIQUID FILLED ORAL at 11:22

## 2017-04-08 RX ADMIN — CLONAZEPAM 0.5 MG: 0.5 TABLET ORAL at 13:20

## 2017-04-08 RX ADMIN — SODIUM CHLORIDE: 9 INJECTION, SOLUTION INTRAVENOUS at 14:59

## 2017-04-08 RX ADMIN — SODIUM CHLORIDE: 9 INJECTION, SOLUTION INTRAVENOUS at 22:20

## 2017-04-08 RX ADMIN — SODIUM CHLORIDE 100 ML: 9 INJECTION, SOLUTION INTRAVENOUS at 10:51

## 2017-04-08 RX ADMIN — PROPRANOLOL HYDROCHLORIDE 10 MG: 10 TABLET ORAL at 07:44

## 2017-04-08 RX ADMIN — SODIUM CHLORIDE: 9 INJECTION, SOLUTION INTRAVENOUS at 06:58

## 2017-04-08 RX ADMIN — PANTOPRAZOLE SODIUM 40 MG: 40 TABLET, DELAYED RELEASE ORAL at 06:55

## 2017-04-08 RX ADMIN — CLONAZEPAM 0.5 MG: 0.5 TABLET ORAL at 07:44

## 2017-04-08 RX ADMIN — LEVOTHYROXINE SODIUM 50 MCG: 50 TABLET ORAL at 06:55

## 2017-04-08 RX ADMIN — CLONAZEPAM 0.5 MG: 0.5 TABLET ORAL at 21:09

## 2017-04-08 RX ADMIN — HYDROCODONE BITARTRATE AND ACETAMINOPHEN 1 TABLET: 5; 325 TABLET ORAL at 07:44

## 2017-04-08 RX ADMIN — IOHEXOL 50 ML: 240 INJECTION, SOLUTION INTRATHECAL; INTRAVASCULAR; INTRAVENOUS; ORAL at 10:48

## 2017-04-08 RX ADMIN — SODIUM CHLORIDE: 9 INJECTION, SOLUTION INTRAVENOUS at 12:51

## 2017-04-08 RX ADMIN — HYDROCODONE BITARTRATE AND ACETAMINOPHEN 1 TABLET: 5; 325 TABLET ORAL at 13:18

## 2017-04-08 ASSESSMENT — PAIN SCALES - GENERAL
PAINLEVEL_OUTOF10: 4
PAINLEVEL_OUTOF10: 5
PAINLEVEL_OUTOF10: 8
PAINLEVEL_OUTOF10: 9
PAINLEVEL_OUTOF10: 8
PAINLEVEL_OUTOF10: 5
PAINLEVEL_OUTOF10: 8

## 2017-04-08 ASSESSMENT — PAIN DESCRIPTION - ORIENTATION: ORIENTATION: LOWER

## 2017-04-08 ASSESSMENT — PAIN DESCRIPTION - LOCATION: LOCATION: BACK

## 2017-04-08 ASSESSMENT — PAIN DESCRIPTION - PAIN TYPE: TYPE: CHRONIC PAIN

## 2017-04-08 NOTE — PROGRESS NOTES
Gastroenterology Progress Note    Maria R Saunders is a 70 y.o. female patient. Hospitalization Day:1  SUBJECTIVE:                      Physical    VITALS:    Visit Vitals    BP (!) 154/84    Pulse 67    Temp 97.5 °F (36.4 °C) (Oral)    Resp 16    Ht 5' 2.5\" (1.588 m)    Wt 131 lb 13.4 oz (59.8 kg)    SpO2 99%    BMI 23.73 kg/m2     TEMPERATURE:  Current - Temp: 97.5 °F (36.4 °C); Max - Temp  Av.5 °F (36.4 °C)  Min: 97.1 °F (36.2 °C)  Max: 97.9 °F (36.6 °C)    General:  Alert and oriented,  No apparent distress  Skin- without jaundice,rash,or cyanosis   Eyes: anicteric sclera,no erythema,PERRLA,EOMI  Nose:no bleeding  Gums:no bleeding,moist  ERAS:no discharge, good hearing  Cardiac: RRR, Nl s1s2, without murmurs,no edema LE  Lungs CTA Bilaterally, normal effort  Abdomen soft, ND, NT, no Hernia, Bowel sounds normal  Ext: without edema,no deformity,no clubbing  Neuro: no asterixis , CN intact, A&Ox3, strength symmetric  Psych: normal affect    Data    Data Review:    Recent Labs      17   1502   17   1115  17   1949  17   0636   WBC  8.6   --    --    --    --    HGB  12.2   < >  10.2*  10.1*  10.8*   HCT  36.7   < >  29.8*  30.2*  31.7*   MCV  105.3*   --    --    --    --    PLT  425   --    --    --    --     < > = values in this interval not displayed. Recent Labs      17   1502  17   0621  17   0636   NA  129*  130*  134*   K  3.6*  3.9  4.2   CL  89*  96*  99   CO2  23  21  22   BUN  10   --   7*   CREATININE  0.51   --   0.47*     Recent Labs      17   1502  17   1141   AST  19  13   ALT  11  8   BILIDIR   --   0.12   BILITOT   --   0.19*   ALKPHOS   --   60     Recent Labs      17   1141   LIPASE  221*   AMYLASE  142*     No results for input(s): PROTIME, INR in the last 72 hours. No results for input(s): PTT in the last 72 hours. CEA:  No results for input(s): CEA in the last 72 hours.   Ca 125:  No results for input(s):  in

## 2017-04-08 NOTE — PROGRESS NOTES
Notified Dr. PABON [facility] [place] regarding results of CT abdomen/pelvis. No new orders received.

## 2017-04-09 LAB
ABSOLUTE EOS #: 0.2 K/UL (ref 0–0.4)
ABSOLUTE LYMPH #: 2.6 K/UL (ref 1–4.8)
ABSOLUTE MONO #: 0.9 K/UL (ref 0.1–1.3)
AMYLASE: 140 U/L (ref 28–100)
ANION GAP SERPL CALCULATED.3IONS-SCNC: 11 MMOL/L (ref 9–17)
BASOPHILS # BLD: 1 % (ref 0–2)
BASOPHILS ABSOLUTE: 0.1 K/UL (ref 0–0.2)
BUN BLDV-MCNC: 10 MG/DL (ref 8–23)
BUN/CREAT BLD: ABNORMAL (ref 9–20)
CALCIUM SERPL-MCNC: 8.4 MG/DL (ref 8.6–10.4)
CHLORIDE BLD-SCNC: 99 MMOL/L (ref 98–107)
CO2: 24 MMOL/L (ref 20–31)
CREAT SERPL-MCNC: 0.51 MG/DL (ref 0.5–0.9)
DIFFERENTIAL TYPE: ABNORMAL
EOSINOPHILS RELATIVE PERCENT: 2 % (ref 0–4)
GFR AFRICAN AMERICAN: >60 ML/MIN
GFR NON-AFRICAN AMERICAN: >60 ML/MIN
GFR SERPL CREATININE-BSD FRML MDRD: ABNORMAL ML/MIN/{1.73_M2}
GFR SERPL CREATININE-BSD FRML MDRD: ABNORMAL ML/MIN/{1.73_M2}
GLUCOSE BLD-MCNC: 104 MG/DL (ref 70–99)
HCT VFR BLD CALC: 30.4 % (ref 36–46)
HCT VFR BLD CALC: 30.6 % (ref 36–46)
HCT VFR BLD CALC: 33 % (ref 36–46)
HEMOGLOBIN: 10.1 G/DL (ref 12–16)
HEMOGLOBIN: 10.2 G/DL (ref 12–16)
HEMOGLOBIN: 10.9 G/DL (ref 12–16)
LIPASE: 176 U/L (ref 13–60)
LYMPHOCYTES # BLD: 31 % (ref 24–44)
MCH RBC QN AUTO: 34.8 PG (ref 26–34)
MCHC RBC AUTO-ENTMCNC: 33.5 G/DL (ref 31–37)
MCV RBC AUTO: 103.9 FL (ref 80–100)
MONOCYTES # BLD: 11 % (ref 1–7)
PDW BLD-RTO: 13.8 % (ref 11.5–14.9)
PLATELET # BLD: 367 K/UL (ref 150–450)
PLATELET ESTIMATE: ABNORMAL
PMV BLD AUTO: 6.8 FL (ref 6–12)
POTASSIUM SERPL-SCNC: 4.3 MMOL/L (ref 3.7–5.3)
RBC # BLD: 2.95 M/UL (ref 4–5.2)
RBC # BLD: ABNORMAL 10*6/UL
SEG NEUTROPHILS: 55 % (ref 36–66)
SEGMENTED NEUTROPHILS ABSOLUTE COUNT: 4.4 K/UL (ref 1.3–9.1)
SODIUM BLD-SCNC: 134 MMOL/L (ref 135–144)
WBC # BLD: 8.2 K/UL (ref 3.5–11)
WBC # BLD: ABNORMAL 10*3/UL

## 2017-04-09 PROCEDURE — 85018 HEMOGLOBIN: CPT

## 2017-04-09 PROCEDURE — 96361 HYDRATE IV INFUSION ADD-ON: CPT

## 2017-04-09 PROCEDURE — 6370000000 HC RX 637 (ALT 250 FOR IP)

## 2017-04-09 PROCEDURE — 85014 HEMATOCRIT: CPT

## 2017-04-09 PROCEDURE — 85025 COMPLETE CBC W/AUTO DIFF WBC: CPT

## 2017-04-09 PROCEDURE — 2580000003 HC RX 258: Performed by: FAMILY MEDICINE

## 2017-04-09 PROCEDURE — 99232 SBSQ HOSP IP/OBS MODERATE 35: CPT | Performed by: FAMILY MEDICINE

## 2017-04-09 PROCEDURE — 6370000000 HC RX 637 (ALT 250 FOR IP): Performed by: INTERNAL MEDICINE

## 2017-04-09 PROCEDURE — G0378 HOSPITAL OBSERVATION PER HR: HCPCS

## 2017-04-09 PROCEDURE — 96376 TX/PRO/DX INJ SAME DRUG ADON: CPT

## 2017-04-09 PROCEDURE — 36415 COLL VENOUS BLD VENIPUNCTURE: CPT

## 2017-04-09 PROCEDURE — 99232 SBSQ HOSP IP/OBS MODERATE 35: CPT | Performed by: INTERNAL MEDICINE

## 2017-04-09 PROCEDURE — 83690 ASSAY OF LIPASE: CPT

## 2017-04-09 PROCEDURE — 6370000000 HC RX 637 (ALT 250 FOR IP): Performed by: FAMILY MEDICINE

## 2017-04-09 PROCEDURE — 80048 BASIC METABOLIC PNL TOTAL CA: CPT

## 2017-04-09 PROCEDURE — 1200000000 HC SEMI PRIVATE

## 2017-04-09 PROCEDURE — 82150 ASSAY OF AMYLASE: CPT

## 2017-04-09 RX ORDER — HYDROCODONE BITARTRATE AND ACETAMINOPHEN 5; 325 MG/1; MG/1
TABLET ORAL
Status: COMPLETED
Start: 2017-04-09 | End: 2017-04-09

## 2017-04-09 RX ORDER — POLYETHYLENE GLYCOL 3350 17 G/17G
250 POWDER, FOR SOLUTION ORAL ONCE
Status: COMPLETED | OUTPATIENT
Start: 2017-04-09 | End: 2017-04-09

## 2017-04-09 RX ADMIN — HYDROCODONE BITARTRATE AND ACETAMINOPHEN 1 TABLET: 5; 325 TABLET ORAL at 10:34

## 2017-04-09 RX ADMIN — HYDROCODONE BITARTRATE AND ACETAMINOPHEN 1 TABLET: 5; 325 TABLET ORAL at 04:40

## 2017-04-09 RX ADMIN — CLONAZEPAM 0.5 MG: 0.5 TABLET ORAL at 22:16

## 2017-04-09 RX ADMIN — SODIUM CHLORIDE: 9 INJECTION, SOLUTION INTRAVENOUS at 06:11

## 2017-04-09 RX ADMIN — SODIUM CHLORIDE: 9 INJECTION, SOLUTION INTRAVENOUS at 14:05

## 2017-04-09 RX ADMIN — PROPRANOLOL HYDROCHLORIDE 10 MG: 10 TABLET ORAL at 09:43

## 2017-04-09 RX ADMIN — SODIUM CHLORIDE: 9 INJECTION, SOLUTION INTRAVENOUS at 23:20

## 2017-04-09 RX ADMIN — CLONAZEPAM 0.5 MG: 0.5 TABLET ORAL at 09:31

## 2017-04-09 RX ADMIN — POLYETHYLENE GLYCOL 3350 250 G: 17 POWDER, FOR SOLUTION ORAL at 17:17

## 2017-04-09 RX ADMIN — HYDROCODONE BITARTRATE AND ACETAMINOPHEN 1 TABLET: 5; 325 TABLET ORAL at 22:16

## 2017-04-09 RX ADMIN — LEVOTHYROXINE SODIUM 50 MCG: 50 TABLET ORAL at 06:11

## 2017-04-09 RX ADMIN — BISACODYL 20 MG: 5 TABLET, COATED ORAL at 15:56

## 2017-04-09 RX ADMIN — CLONAZEPAM 0.5 MG: 0.5 TABLET ORAL at 14:04

## 2017-04-09 RX ADMIN — CITALOPRAM 40 MG: 40 TABLET ORAL at 09:31

## 2017-04-09 RX ADMIN — PANTOPRAZOLE SODIUM 40 MG: 40 TABLET, DELAYED RELEASE ORAL at 06:11

## 2017-04-09 ASSESSMENT — PAIN DESCRIPTION - LOCATION
LOCATION: BACK
LOCATION: ABDOMEN;BACK

## 2017-04-09 ASSESSMENT — PAIN DESCRIPTION - DESCRIPTORS: DESCRIPTORS: ACHING

## 2017-04-09 ASSESSMENT — PAIN DESCRIPTION - PAIN TYPE
TYPE: ACUTE PAIN;CHRONIC PAIN
TYPE: CHRONIC PAIN

## 2017-04-09 ASSESSMENT — PAIN SCALES - GENERAL
PAINLEVEL_OUTOF10: 8
PAINLEVEL_OUTOF10: 8
PAINLEVEL_OUTOF10: 4
PAINLEVEL_OUTOF10: 4
PAINLEVEL_OUTOF10: 8

## 2017-04-09 NOTE — PLAN OF CARE
Problem: Falls - Risk of  Goal: Absence of falls  Outcome: Met This Shift  A/O , uses call light prn for nurse assist. No injury noted. Problem: Risk for Impaired Skin Integrity  Goal: Tissue integrity - skin and mucous membranes  Structural intactness and normal physiological function of skin and  mucous membranes. Outcome: Met This Shift  Pt repositioned @ intervals. No skin breakdown noted.  Skin assessment completed every 4 hours and prn  Turn [id number] and prn, heels elevated with pillows    Problem: Bleeding:  Goal: Will show no signs and symptoms of excessive bleeding  Will show no signs and symptoms of excessive bleeding   Outcome: Met This Shift  No s/s bleeding noted Hg stable

## 2017-04-09 NOTE — PROGRESS NOTES
Progress Note    4/9/2017 10:14 AM  Subjective: Interval History: pt still with abdominal pain. Ct scan abdomen noted. Diet: DIET CLEAR LIQUID;  Diet NPO, After Midnight    Medications:   Reviewed medications    Labs:   CBC:   Recent Labs      04/09/17 0614  04/09/17   0755   WBC  8.2   --    HGB  10.2*  10.1*   PLT  367   --      BMP:    Recent Labs      04/09/17   0614   NA  134*   K  4.3   CL  99   CO2  24   BUN  10   CREATININE  0.51   GLUCOSE  104*     Hepatic:   Recent Labs      04/08/17   1141   AST  13   ALT  8   BILITOT  0.19*   ALKPHOS  60     Troponin: No results for input(s): TROPONINI in the last 72 hours. BNP: No results for input(s): BNP in the last 72 hours. Lipids: No results for input(s): CHOL, HDL in the last 72 hours. Invalid input(s): LDLCALCU  INR: No results for input(s): INR in the last 72 hours. Objective:   Vitals: /82  Pulse 62  Temp 97.5 °F (36.4 °C) (Oral)   Resp 18  Ht 5' 2.5\" (1.588 m)  Wt 131 lb 13.4 oz (59.8 kg)  SpO2 98%  BMI 23.73 kg/m2  General appearance: alert and cooperative with exam  Neck: no adenopathy, no carotid bruit, no JVD, supple, symmetrical, trachea midline and thyroid not enlarged, symmetric, no tenderness/mass/nodules  Lungs: clear to auscultation bilaterally  Heart: regular rate and rhythm, S1, S2 normal, no murmur, click, rub or gallop  Abdomen: diffuse discomfort  Extremities: no edema  Neurologic: Mental status: Alert, oriented, thought content appropriate    Assessment and Plan:   1. Abdominal pain- await egd and colonosocpy  2.  Continue supportive measures    Patient Active Problem List:     Anorexia     Arthritis     Hypothyroidism     Memory loss     Menopause     Muscle weakness     Tremor     Hypothyroidism     Chronic pain     Compressed spine fracture (HCC)     Closed fracture of second lumbar vertebra (HCC)     Closed fracture of ninth thoracic vertebra (HCC)     Menopausal osteoporosis     Alcoholism ([facility] [address]

## 2017-04-10 VITALS
RESPIRATION RATE: 16 BRPM | SYSTOLIC BLOOD PRESSURE: 128 MMHG | OXYGEN SATURATION: 100 % | HEIGHT: 63 IN | BODY MASS INDEX: 23.36 KG/M2 | TEMPERATURE: 97.5 F | DIASTOLIC BLOOD PRESSURE: 70 MMHG | WEIGHT: 131.84 LBS | HEART RATE: 58 BPM

## 2017-04-10 LAB
AMYLASE: 118 U/L (ref 28–100)
HCT VFR BLD CALC: 32.8 % (ref 36–46)
HEMOGLOBIN: 10.8 G/DL (ref 12–16)
LIPASE: 113 U/L (ref 13–60)

## 2017-04-10 PROCEDURE — 6360000002 HC RX W HCPCS: Performed by: INTERNAL MEDICINE

## 2017-04-10 PROCEDURE — 0DBN8ZX EXCISION OF SIGMOID COLON, VIA NATURAL OR ARTIFICIAL OPENING ENDOSCOPIC, DIAGNOSTIC: ICD-10-PCS | Performed by: INTERNAL MEDICINE

## 2017-04-10 PROCEDURE — 2580000003 HC RX 258: Performed by: FAMILY MEDICINE

## 2017-04-10 PROCEDURE — 3609010600 HC COLONOSCOPY POLYPECTOMY SNARE/COLD BIOPSY: Performed by: INTERNAL MEDICINE

## 2017-04-10 PROCEDURE — 0DBK8ZX EXCISION OF ASCENDING COLON, VIA NATURAL OR ARTIFICIAL OPENING ENDOSCOPIC, DIAGNOSTIC: ICD-10-PCS | Performed by: INTERNAL MEDICINE

## 2017-04-10 PROCEDURE — 85018 HEMOGLOBIN: CPT

## 2017-04-10 PROCEDURE — 7100000010 HC PHASE II RECOVERY - FIRST 15 MIN: Performed by: INTERNAL MEDICINE

## 2017-04-10 PROCEDURE — 99153 MOD SED SAME PHYS/QHP EA: CPT | Performed by: INTERNAL MEDICINE

## 2017-04-10 PROCEDURE — 6370000000 HC RX 637 (ALT 250 FOR IP): Performed by: FAMILY MEDICINE

## 2017-04-10 PROCEDURE — 85014 HEMATOCRIT: CPT

## 2017-04-10 PROCEDURE — 0DB98ZX EXCISION OF DUODENUM, VIA NATURAL OR ARTIFICIAL OPENING ENDOSCOPIC, DIAGNOSTIC: ICD-10-PCS | Performed by: INTERNAL MEDICINE

## 2017-04-10 PROCEDURE — 43239 EGD BIOPSY SINGLE/MULTIPLE: CPT | Performed by: INTERNAL MEDICINE

## 2017-04-10 PROCEDURE — 36415 COLL VENOUS BLD VENIPUNCTURE: CPT

## 2017-04-10 PROCEDURE — 99239 HOSP IP/OBS DSCHRG MGMT >30: CPT | Performed by: FAMILY MEDICINE

## 2017-04-10 PROCEDURE — 6370000000 HC RX 637 (ALT 250 FOR IP): Performed by: INTERNAL MEDICINE

## 2017-04-10 PROCEDURE — 88342 IMHCHEM/IMCYTCHM 1ST ANTB: CPT

## 2017-04-10 PROCEDURE — 88305 TISSUE EXAM BY PATHOLOGIST: CPT

## 2017-04-10 PROCEDURE — 99152 MOD SED SAME PHYS/QHP 5/>YRS: CPT | Performed by: INTERNAL MEDICINE

## 2017-04-10 PROCEDURE — 45385 COLONOSCOPY W/LESION REMOVAL: CPT | Performed by: INTERNAL MEDICINE

## 2017-04-10 PROCEDURE — 7100000011 HC PHASE II RECOVERY - ADDTL 15 MIN: Performed by: INTERNAL MEDICINE

## 2017-04-10 PROCEDURE — 3609012400 HC EGD TRANSORAL BIOPSY SINGLE/MULTIPLE: Performed by: INTERNAL MEDICINE

## 2017-04-10 PROCEDURE — 82150 ASSAY OF AMYLASE: CPT

## 2017-04-10 PROCEDURE — 83690 ASSAY OF LIPASE: CPT

## 2017-04-10 PROCEDURE — 45384 COLONOSCOPY W/LESION REMOVAL: CPT | Performed by: INTERNAL MEDICINE

## 2017-04-10 PROCEDURE — 0DB68ZX EXCISION OF STOMACH, VIA NATURAL OR ARTIFICIAL OPENING ENDOSCOPIC, DIAGNOSTIC: ICD-10-PCS | Performed by: INTERNAL MEDICINE

## 2017-04-10 PROCEDURE — G0378 HOSPITAL OBSERVATION PER HR: HCPCS

## 2017-04-10 PROCEDURE — 0D5N8ZZ DESTRUCTION OF SIGMOID COLON, VIA NATURAL OR ARTIFICIAL OPENING ENDOSCOPIC: ICD-10-PCS | Performed by: INTERNAL MEDICINE

## 2017-04-10 RX ORDER — MEPERIDINE HYDROCHLORIDE 50 MG/ML
INJECTION INTRAMUSCULAR; INTRAVENOUS; SUBCUTANEOUS PRN
Status: DISCONTINUED | OUTPATIENT
Start: 2017-04-10 | End: 2017-04-10 | Stop reason: HOSPADM

## 2017-04-10 RX ORDER — HYDROCODONE BITARTRATE AND ACETAMINOPHEN 5; 325 MG/1; MG/1
1 TABLET ORAL EVERY 4 HOURS PRN
Qty: 120 TABLET | Refills: 0 | Status: SHIPPED | OUTPATIENT
Start: 2017-04-10 | End: 2017-05-05 | Stop reason: SDUPTHER

## 2017-04-10 RX ORDER — PANTOPRAZOLE SODIUM 40 MG/1
40 TABLET, DELAYED RELEASE ORAL
Qty: 30 TABLET | Refills: 3 | Status: SHIPPED | OUTPATIENT
Start: 2017-04-10 | End: 2017-08-07 | Stop reason: SDUPTHER

## 2017-04-10 RX ORDER — MIDAZOLAM HYDROCHLORIDE 1 MG/ML
INJECTION INTRAMUSCULAR; INTRAVENOUS PRN
Status: DISCONTINUED | OUTPATIENT
Start: 2017-04-10 | End: 2017-04-10 | Stop reason: HOSPADM

## 2017-04-10 RX ADMIN — SODIUM CHLORIDE: 9 INJECTION, SOLUTION INTRAVENOUS at 05:51

## 2017-04-10 RX ADMIN — CITALOPRAM 40 MG: 40 TABLET ORAL at 14:12

## 2017-04-10 RX ADMIN — PROPRANOLOL HYDROCHLORIDE 10 MG: 10 TABLET ORAL at 07:23

## 2017-04-10 ASSESSMENT — PAIN SCALES - GENERAL: PAINLEVEL_OUTOF10: 0

## 2017-04-10 ASSESSMENT — PAIN - FUNCTIONAL ASSESSMENT: PAIN_FUNCTIONAL_ASSESSMENT: 0-10

## 2017-04-10 NOTE — CARE COORDINATION
[facility]) Continuity of Care Form    Patient Name: Piotr Messina   :  1945  MRN:  [id number]    Admit date:  2017  Discharge date:  4/10/17    Code Status Order: Prior   Advance Directives: No    Admitting Physician:  Rachael Miner MD  PCP: Rachael Miner MD    Discharging Nurse: [facility] Unit/Room#: 2060/2060-01  Discharging Unit Phone Number: [phone]    Emergency Contact:        Past Surgical History:  Past Surgical History   Procedure Laterality Date    Hernia repair      Hysterectomy      Carpal tunnel release      Shoulder arthroscopy      Cataract removal      Tubal ligation      Kyphosis surgery  10/27/2016     kyphoplasty L1 and L5       Immunization History:   Immunization History   Administered Date(s) Administered    Influenza Virus Vaccine 2013, 10/30/2014    Influenza, High dose, IM, Preservative-free 2015    Influenza, Quadrivalent, IM, Preservative Free 10/20/2016    Pneumococcal 13-valent Conjugate ([id number]) 2015    Pneumococcal Polysaccharide ([id number]) 10/20/2016       Active Problems:  Patient Active Problem List   Diagnosis    Anorexia    Arthritis    Hypothyroidism    Memory loss    Menopause    Muscle weakness    Tremor    Hypothyroidism    Chronic pain    Compressed spine fracture ([address]    Closed fracture of second lumbar vertebra ([address]    Closed fracture of ninth thoracic vertebra ([address]    Menopausal osteoporosis    Alcoholism ([address]    Compression fx, lumbar spine ([address]    Osteoporosis    Dysthymia    Medication management       Isolation/Infection:   Isolation     C Diff Contact            Nurse Assessment:  Last Vital Signs:   Visit Vitals    BP (!) 169/95    Pulse 75    Temp 98.4 °F (36.9 °C) (Oral)    Resp 16    Ht 5' 2.5\" (1.588 m)    Wt 123 lb 10.9 oz (56.1 kg)    SpO2 100%    BMI 22.26 kg/m2       Last documented pain score (0-10 scale): Pain Level: 9  Last Weight:   Wt Readings from Last 1 Encounters:   04/06/17 123 lb 10.9 oz (56.1 kg)     Mental Status:  oriented and alert     IV Access:  - None    Nursing Mobility/ADLs:  Walking   Assisted  Transfer  Assisted   Bathing  Assisted  Dressing  Assisted  Toileting  Assisted  Feeding  Independent  Med Admin  Assisted  Med Delivery   whole    Wound Care Documentation and Therapy:  Incision 10/27/16 Back Lower (Active)   Number of days:161        Elimination:  Continence:   · Bowel: No  · Bladder: No  Urinary Catheter: None   Colostomy/Ileostomy/Ileal Conduit: No    Safety Concerns: At Risk for Falls    Impairments/Disabilities:      Vision    Nutrition Therapy:  Current Nutrition Therapy:   - Oral Diet:  Dental Soft    Routes of Feeding: Oral  Liquids: No Restrictions  Daily Fluid Restriction: no  Last Modified Barium Swallow with Video (Video Swallowing Test): not done    Treatments at the Time of Hospital Discharge:   Respiratory Treatments: no-see MAR  Oxygen Therapy:  is not on home oxygen therapy. Ventilator:    - No ventilator support    Rehab Therapies: Physical Therapy and Occupational Therapy  Weight Bearing Status/Restrictions: No weight bearing restirctions  Other Medical Equipment (for information only, NOT a DME order):  wheelchair and walker  Other Treatments: skilled assessment, medication education, RESUME ALL PREVIOUS SERVICES, RESUME ALL [facility] CARE    Patient's personal belongings (please select all that are sent with patient):  Thais    RN SIGNATURE:  Electronically signed by Cece Hodgson RN on 4/8/17 at 10:37 AM    PHYSICIAN SECTION    Prognosis: Fair    Condition at Discharge: Stable    Rehab Potential (if transferring to Rehab): Fair    Recommended Labs or Other Treatments After Discharge: see above    Physician Certification: I certify the above information and transfer of Steven Jackson  is necessary for the continuing treatment of the diagnosis listed and that she requires Home Care for less 30 days.      Update MG tablet 1 mg     Take 1 mg by mouth daily         nicotine (NICODERM CQ) 21 MG/24HR 1 patch     Place 1 patch onto the skin every 24 hours   Quantity: 21 patch Refills: 0         nicotine (NICODERM CQ) 14 MG/24HR 1 patch     Place 1 patch onto the skin every 24 hours   Quantity: 21 patch Refills: 0         nicotine (NICODERM CQ) 7 MG/24HR 1 patch     Place 1 patch onto the skin every 24 hours   Quantity: 21 patch Refills: 0         albuterol sulfate HFA (PROVENTIL HFA) 108 (90 BASE) MCG/ACT inhaler 1-2 puffs     Inhale 1-2 puffs into the lungs every 4 hours as needed for Wheezing or Shortness of Breath (Space out to every 6 hours as symptoms improve) Space out to every 6 hours as symptoms improve. Quantity: 1 Inhaler Refills: 0          !! Potential duplicate medications found. Please discuss with provider.

## 2017-04-10 NOTE — OP NOTE
PROCEDURE NOTE    DATE OF PROCEDURE: 4/10/2017    SURGEON: Isa Chau MD  Facility : [facility]  ASSISTANT: None  Anesthesia: Demerol 100 mg IV, Versed 4 mg IV  PREOPERATIVE DIAGNOSIS:   Anemia   abnormal ct colon     POSTOPERATIVE DIAGNOSIS: as described below    OPERATION: Total colonoscopy     ANESTHESIA: Moderate Sedation    ESTIMATED BLOOD LOSS: less than 50     COMPLICATIONS: None. SPECIMENS:  Was Obtained:   Rt colon polyp, snared . 1 cm   Multiple tiny sessile polyps in recto-sigmoid, some removed with hot bx, and some just cauterized , the largest was 9 mm      HISTORY: The patient is a 70y.o. year old female with history of above preop diagnosis. I recommended colonoscopy with possible biopsy or polypectomy and I explained the risk, benefits, expected outcome, and alternatives to the procedure. Risks included but are not limited to bleeding, infection, respiratory distress, hypotension, and perforation of the colon and possibility of missing a lesion. The patient understands and is in agreement. PROCEDURE: The patient was given IV conscious sedation. The patient's SPO2 remained above 90% throughout the procedure. The colonoscope was inserted per rectum and advanced under direct vision to the cecum without difficulty. The prep was poor. Findings:  Terminal ileum: normal    Cecum/Ascending colon: abnormal: Rt colon polyp, snared . 1 cm         Transverse colon: abnormal: serever diverticulosis     Descending/Sigmoid colon: abnormal: diverticulosis, Multiple tiny sessile polyps in recto-sigmoid, some removed with hot bx, and some just cauterized , the largest was 9 mm, the sigmoid colon would not inflate well because of redundancy and edema, and the patient cant keep the air , also , weak sphincter     Rectum/Anus: examined in normal and retroflexed positions and was normal    Withdrawal Time was (minutes): 14    The colon was decompressed and the scope was removed.   The

## 2017-04-10 NOTE — PROGRESS NOTES
Nutrition Assessment    Type and Reason for Visit: Initial, Positive Nutrition Screen, Consult (Poor appetite and oral intake, wt loss)    Nutrition Recommendations: Continue General diet. Malnutrition Assessment:  · Malnutrition Status: At risk for malnutrition  · Context: Acute illness or injury  · Findings of the 6 clinical characteristics of malnutrition (Minimum of 2 out of 6 clinical characteristics is required to make the diagnosis of moderate or severe Protein Calorie Malnutrition based on AND/ASPEN Guidelines):  1. Energy Intake-Less than or equal to 75%, greater than or equal to 1 month      Nutrition Diagnosis:   · Problem: Inadequate oral intake  · Etiology: related to Alteration in GI function     Signs and symptoms:  as evidenced by Nausea, Vomiting, Diarrhea    Nutrition Assessment:  · Subjective Assessment: RN reports patient is out of room for EGD and colonoscopy. · Wound Type: None  · Current Nutrition Therapies:  · Oral Diet Orders: General   · Oral Diet intake: 51-75%  · Anthropometric Measures:  · Ht: 5' 2.5\" (158.8 cm)   · Current Body Wt: 132 lb 4.4 oz (60 kg)  · Admission Body Wt: 123 lb 10.9 oz (56.1 kg)  · Usual Body Wt: 130 lb (59 kg)  · % Weight Change: Pt possibly dehydrated on admission with current wt appearing near usual wt,     · Ideal Body Wt: 112 lb 8 oz (51 kg), % Ideal Body 118%  · BMI Classification: BMI 18.5 - 24.9 Normal Weight  · Comparative Standards (Estimated Nutrition Needs):  · Estimated Daily Total Kcal: 7744-0933  · Estimated Daily Protein (g): 60    Estimated Intake vs Estimated Needs: Intake Meets Needs    Nutrition Risk Level: High, Moderate    Nutrition Interventions:   Continue current diet  Continued Inpatient Monitoring    Nutrition Evaluation:   · Evaluation: Progressing toward goals   · Goals: PO intake % of meals    · Monitoring: Meal Intake, Pertinent Labs, Diet Tolerance    See Adult Nutrition Doc Flowsheet for more detail.      Radha Garcia

## 2017-04-12 LAB — SURGICAL PATHOLOGY REPORT: NORMAL

## 2017-04-24 PROBLEM — E87.8 ELECTROLYTE IMBALANCE: Status: ACTIVE | Noted: 2017-04-24

## 2017-05-16 ENCOUNTER — OFFICE VISIT (OUTPATIENT)
Dept: GASTROENTEROLOGY | Age: 72
End: 2017-05-16
Payer: COMMERCIAL

## 2017-05-16 VITALS
HEART RATE: 60 BPM | WEIGHT: 122.7 LBS | HEIGHT: 63 IN | OXYGEN SATURATION: 93 % | TEMPERATURE: 97 F | DIASTOLIC BLOOD PRESSURE: 89 MMHG | SYSTOLIC BLOOD PRESSURE: 160 MMHG | BODY MASS INDEX: 21.74 KG/M2

## 2017-05-16 DIAGNOSIS — F10.10 ETOH ABUSE: ICD-10-CM

## 2017-05-16 DIAGNOSIS — F10.20 ALCOHOLISM (HCC): Primary | Chronic | ICD-10-CM

## 2017-05-16 DIAGNOSIS — K26.3 PEPTIC ULCER OF DUODENUM: ICD-10-CM

## 2017-05-16 DIAGNOSIS — D12.6 TUBULAR ADENOMA OF COLON: ICD-10-CM

## 2017-05-16 DIAGNOSIS — K22.2 SCHATZKI'S RING: ICD-10-CM

## 2017-05-16 DIAGNOSIS — A04.8 H. PYLORI INFECTION: ICD-10-CM

## 2017-05-16 DIAGNOSIS — K62.1 SESSILE RECTAL POLYP: ICD-10-CM

## 2017-05-16 PROCEDURE — 99214 OFFICE O/P EST MOD 30 MIN: CPT | Performed by: INTERNAL MEDICINE

## 2017-05-16 ASSESSMENT — ENCOUNTER SYMPTOMS
ANAL BLEEDING: 0
BLOOD IN STOOL: 0
ABDOMINAL PAIN: 0
TROUBLE SWALLOWING: 0
BACK PAIN: 1
EYES NEGATIVE: 1
RECTAL PAIN: 0
ABDOMINAL DISTENTION: 0
CONSTIPATION: 0
ALLERGIC/IMMUNOLOGIC NEGATIVE: 1
GASTROINTESTINAL NEGATIVE: 1
VOMITING: 0
RESPIRATORY NEGATIVE: 1
NAUSEA: 0
DIARRHEA: 0

## 2017-05-19 RX ORDER — CLARITHROMYCIN 500 MG/1
500 TABLET, COATED ORAL 2 TIMES DAILY
Qty: 20 TABLET | Refills: 0 | Status: CANCELLED | OUTPATIENT
Start: 2017-05-19 | End: 2017-05-29

## 2017-05-22 RX ORDER — AMOXICILLIN 500 MG/1
1000 TABLET, FILM COATED ORAL 2 TIMES DAILY
Qty: 40 TABLET | Refills: 0 | Status: SHIPPED | OUTPATIENT
Start: 2017-05-22 | End: 2017-06-01

## 2017-05-22 RX ORDER — METRONIDAZOLE 500 MG/1
500 TABLET ORAL 3 TIMES DAILY
Qty: 30 TABLET | Refills: 0 | Status: SHIPPED | OUTPATIENT
Start: 2017-05-22 | End: 2017-06-01

## 2017-07-14 ENCOUNTER — OFFICE VISIT (OUTPATIENT)
Dept: GASTROENTEROLOGY | Age: 72
End: 2017-07-14
Payer: COMMERCIAL

## 2017-07-14 VITALS
SYSTOLIC BLOOD PRESSURE: 155 MMHG | HEIGHT: 62 IN | HEART RATE: 66 BPM | WEIGHT: 118.7 LBS | DIASTOLIC BLOOD PRESSURE: 90 MMHG | TEMPERATURE: 98.1 F | BODY MASS INDEX: 21.84 KG/M2 | RESPIRATION RATE: 14 BRPM | OXYGEN SATURATION: 96 %

## 2017-07-14 DIAGNOSIS — K22.2 SCHATZKI'S RING: ICD-10-CM

## 2017-07-14 DIAGNOSIS — K26.3 PEPTIC ULCER OF DUODENUM: ICD-10-CM

## 2017-07-14 DIAGNOSIS — F10.20 ETOHISM (HCC): ICD-10-CM

## 2017-07-14 DIAGNOSIS — Z86.19 HISTORY OF HELICOBACTER PYLORI INFECTION: ICD-10-CM

## 2017-07-14 DIAGNOSIS — K62.1 SESSILE RECTAL POLYP: Primary | ICD-10-CM

## 2017-07-14 PROCEDURE — 99214 OFFICE O/P EST MOD 30 MIN: CPT | Performed by: INTERNAL MEDICINE

## 2017-07-14 ASSESSMENT — ENCOUNTER SYMPTOMS
EYES NEGATIVE: 1
DIARRHEA: 0
ABDOMINAL DISTENTION: 0
NAUSEA: 0
ABDOMINAL PAIN: 0
VOMITING: 0
COUGH: 1
RECTAL PAIN: 0
CONSTIPATION: 0
GASTROINTESTINAL NEGATIVE: 1
ANAL BLEEDING: 0
ALLERGIC/IMMUNOLOGIC NEGATIVE: 1
BLOOD IN STOOL: 0
TROUBLE SWALLOWING: 0
BACK PAIN: 1

## 2017-08-11 LAB
ABSOLUTE BASO #: 0.1 K/UL (ref 0–0.1)
ABSOLUTE EOS #: 0.1 K/UL (ref 0.1–0.4)
ABSOLUTE LYMPH #: 1.7 K/UL (ref 0.8–5.2)
ABSOLUTE MONO #: 0.7 K/UL (ref 0.1–0.9)
ABSOLUTE NEUT #: 4.5 K/UL (ref 1.3–9.1)
ALBUMIN SERPL-MCNC: 4.8 G/DL (ref 3.2–5.3)
ALK PHOSPHATASE: 93 IU/L (ref 35–121)
ALT SERPL-CCNC: 13 IU/L (ref 5–59)
AMYLASE: 34 IU/L (ref 35–120)
AST SERPL-CCNC: 25 IU/L (ref 10–42)
BASOPHILS RELATIVE PERCENT: 0.7 %
BILIRUB SERPL-MCNC: 1 MG/DL (ref 0.2–1.3)
BILIRUBIN DIRECT: 0.3 MG/DL (ref 0–0.2)
EOSINOPHILS RELATIVE PERCENT: 1.3 %
HCT VFR BLD CALC: 35.5 % (ref 36–48)
HEMOGLOBIN: 12.5 G/DL (ref 12–16)
LIPASE: 36 IU/L (ref 11–82)
LYMPHOCYTE %: 24.4 %
MCH RBC QN AUTO: 35 PG (ref 27–34)
MCHC RBC AUTO-ENTMCNC: 35.2 G/DL (ref 31–36)
MCV RBC AUTO: 99.4 FL (ref 80–100)
MONOCYTES # BLD: 9.3 %
NEUTROPHILS RELATIVE PERCENT: 63.9 %
PDW BLD-RTO: 13.7 % (ref 10.8–14.8)
PLATELETS: 247 K/UL (ref 150–450)
RBC: 3.57 M/UL (ref 4–5.5)
TOTAL PROTEIN: 7.1 G/DL (ref 5.8–8)
WBC: 7.1 K/UL (ref 3.7–10.8)

## 2017-08-15 DIAGNOSIS — K26.3 PEPTIC ULCER OF DUODENUM: ICD-10-CM

## 2017-08-15 DIAGNOSIS — K62.1 SESSILE RECTAL POLYP: ICD-10-CM

## 2017-08-15 DIAGNOSIS — F10.20 ALCOHOLISM (HCC): Chronic | ICD-10-CM

## 2017-08-15 DIAGNOSIS — K22.2 SCHATZKI'S RING: ICD-10-CM

## 2017-08-15 DIAGNOSIS — D12.6 TUBULAR ADENOMA OF COLON: ICD-10-CM

## 2017-08-15 DIAGNOSIS — F10.10 ETOH ABUSE: ICD-10-CM

## 2017-08-15 DIAGNOSIS — A04.8 H. PYLORI INFECTION: ICD-10-CM

## 2017-08-25 PROBLEM — J44.9 CHRONIC OBSTRUCTIVE PULMONARY DISEASE (HCC): Status: ACTIVE | Noted: 2017-08-25

## 2017-08-28 ENCOUNTER — HOSPITAL ENCOUNTER (OUTPATIENT)
Dept: GENERAL RADIOLOGY | Age: 72
Discharge: HOME | End: 2017-08-28
Payer: COMMERCIAL

## 2017-08-28 ENCOUNTER — HOSPITAL ENCOUNTER (OUTPATIENT)
Age: 72
Discharge: HOME | End: 2017-08-28
Payer: COMMERCIAL

## 2017-08-28 DIAGNOSIS — J44.9 CHRONIC OBSTRUCTIVE PULMONARY DISEASE, UNSPECIFIED COPD TYPE (HCC): ICD-10-CM

## 2017-08-28 DIAGNOSIS — R06.02 SOB (SHORTNESS OF BREATH): ICD-10-CM

## 2017-08-28 PROCEDURE — 71020 XR CHEST STANDARD TWO VW: CPT

## 2017-09-19 ENCOUNTER — APPOINTMENT (OUTPATIENT)
Dept: CT IMAGING | Age: 72
End: 2017-09-19
Payer: COMMERCIAL

## 2017-09-19 ENCOUNTER — HOSPITAL ENCOUNTER (EMERGENCY)
Age: 72
Discharge: HOME | End: 2017-09-19
Attending: EMERGENCY MEDICINE
Payer: COMMERCIAL

## 2017-09-19 VITALS
OXYGEN SATURATION: 96 % | TEMPERATURE: 98.2 F | RESPIRATION RATE: 12 BRPM | SYSTOLIC BLOOD PRESSURE: 155 MMHG | WEIGHT: 120 LBS | HEART RATE: 65 BPM | DIASTOLIC BLOOD PRESSURE: 88 MMHG | BODY MASS INDEX: 22.08 KG/M2 | HEIGHT: 62 IN

## 2017-09-19 DIAGNOSIS — F10.920 ACUTE ALCOHOLIC INTOXICATION, UNCOMPLICATED (HCC): ICD-10-CM

## 2017-09-19 DIAGNOSIS — S09.90XA CLOSED HEAD INJURY, INITIAL ENCOUNTER: Primary | ICD-10-CM

## 2017-09-19 DIAGNOSIS — S01.81XA CHIN LACERATION, INITIAL ENCOUNTER: ICD-10-CM

## 2017-09-19 DIAGNOSIS — E87.1 HYPONATREMIA: ICD-10-CM

## 2017-09-19 LAB
ABSOLUTE EOS #: 0.1 K/UL (ref 0–0.4)
ABSOLUTE LYMPH #: 2.1 K/UL (ref 1–4.8)
ABSOLUTE MONO #: 0.6 K/UL (ref 0.1–1.3)
ANION GAP SERPL CALCULATED.3IONS-SCNC: 15 MMOL/L (ref 9–17)
BASOPHILS # BLD: 1 %
BASOPHILS ABSOLUTE: 0 K/UL (ref 0–0.2)
BUN BLDV-MCNC: 6 MG/DL (ref 8–23)
BUN/CREAT BLD: ABNORMAL (ref 9–20)
CALCIUM SERPL-MCNC: 9.2 MG/DL (ref 8.6–10.4)
CHLORIDE BLD-SCNC: 91 MMOL/L (ref 98–107)
CO2: 25 MMOL/L (ref 20–31)
CREAT SERPL-MCNC: 0.55 MG/DL (ref 0.5–0.9)
DIFFERENTIAL TYPE: ABNORMAL
EOSINOPHILS RELATIVE PERCENT: 2 %
ETHANOL PERCENT: 0.1 %
ETHANOL: 98 MG/DL
GFR AFRICAN AMERICAN: >60 ML/MIN
GFR NON-AFRICAN AMERICAN: >60 ML/MIN
GFR SERPL CREATININE-BSD FRML MDRD: ABNORMAL ML/MIN/{1.73_M2}
GFR SERPL CREATININE-BSD FRML MDRD: ABNORMAL ML/MIN/{1.73_M2}
GLUCOSE BLD-MCNC: 87 MG/DL (ref 70–99)
HCT VFR BLD CALC: 38.3 % (ref 36–46)
HEMOGLOBIN: 13 G/DL (ref 12–16)
LYMPHOCYTES # BLD: 38 %
MCH RBC QN AUTO: 38.2 PG (ref 26–34)
MCHC RBC AUTO-ENTMCNC: 33.8 G/DL (ref 31–37)
MCV RBC AUTO: 113.1 FL (ref 80–100)
MONOCYTES # BLD: 11 %
PDW BLD-RTO: 15.1 % (ref 11.5–14.9)
PLATELET # BLD: 271 K/UL (ref 150–450)
PLATELET ESTIMATE: ABNORMAL
PMV BLD AUTO: 7.3 FL (ref 6–12)
POTASSIUM SERPL-SCNC: 4.2 MMOL/L (ref 3.7–5.3)
RBC # BLD: 3.39 M/UL (ref 4–5.2)
RBC # BLD: ABNORMAL 10*6/UL
SEG NEUTROPHILS: 48 %
SEGMENTED NEUTROPHILS ABSOLUTE COUNT: 2.6 K/UL (ref 1.3–9.1)
SODIUM BLD-SCNC: 131 MMOL/L (ref 135–144)
TROPONIN INTERP: NORMAL
TROPONIN T: <0.03 NG/ML
WBC # BLD: 5.5 K/UL (ref 3.5–11)
WBC # BLD: ABNORMAL 10*3/UL

## 2017-09-19 PROCEDURE — 72125 CT NECK SPINE W/O DYE: CPT

## 2017-09-19 PROCEDURE — 99284 EMERGENCY DEPT VISIT MOD MDM: CPT

## 2017-09-19 PROCEDURE — 6360000002 HC RX W HCPCS: Performed by: EMERGENCY MEDICINE

## 2017-09-19 PROCEDURE — 2500000003 HC RX 250 WO HCPCS: Performed by: EMERGENCY MEDICINE

## 2017-09-19 PROCEDURE — 84484 ASSAY OF TROPONIN QUANT: CPT

## 2017-09-19 PROCEDURE — 70486 CT MAXILLOFACIAL W/O DYE: CPT

## 2017-09-19 PROCEDURE — 90471 IMMUNIZATION ADMIN: CPT | Performed by: EMERGENCY MEDICINE

## 2017-09-19 PROCEDURE — 90715 TDAP VACCINE 7 YRS/> IM: CPT | Performed by: EMERGENCY MEDICINE

## 2017-09-19 PROCEDURE — 85025 COMPLETE CBC W/AUTO DIFF WBC: CPT

## 2017-09-19 PROCEDURE — 12013 RPR F/E/E/N/L/M 2.6-5.0 CM: CPT

## 2017-09-19 PROCEDURE — 36415 COLL VENOUS BLD VENIPUNCTURE: CPT

## 2017-09-19 PROCEDURE — 80048 BASIC METABOLIC PNL TOTAL CA: CPT

## 2017-09-19 PROCEDURE — 70450 CT HEAD/BRAIN W/O DYE: CPT

## 2017-09-19 PROCEDURE — G0480 DRUG TEST DEF 1-7 CLASSES: HCPCS

## 2017-09-19 RX ORDER — LIDOCAINE HYDROCHLORIDE 10 MG/ML
20 INJECTION, SOLUTION INFILTRATION; PERINEURAL ONCE
Status: COMPLETED | OUTPATIENT
Start: 2017-09-19 | End: 2017-09-19

## 2017-09-19 RX ADMIN — TETANUS TOXOID, REDUCED DIPHTHERIA TOXOID AND ACELLULAR PERTUSSIS VACCINE, ADSORBED 0.5 ML: 5; 2.5; 8; 8; 2.5 SUSPENSION INTRAMUSCULAR at 20:43

## 2017-09-19 RX ADMIN — LIDOCAINE HYDROCHLORIDE 20 ML: 10 INJECTION, SOLUTION INFILTRATION; PERINEURAL at 21:57

## 2017-09-19 ASSESSMENT — PAIN DESCRIPTION - PAIN TYPE
TYPE_2: CHRONIC PAIN
TYPE: ACUTE PAIN

## 2017-09-19 ASSESSMENT — PAIN DESCRIPTION - DESCRIPTORS
DESCRIPTORS: ACHING
DESCRIPTORS_2: ACHING

## 2017-09-19 ASSESSMENT — PAIN DESCRIPTION - ONSET: ONSET_2: ON-GOING

## 2017-09-19 ASSESSMENT — PAIN DESCRIPTION - INTENSITY: RATING_2: 10

## 2017-09-19 ASSESSMENT — PAIN DESCRIPTION - LOCATION
LOCATION: FACE
LOCATION_2: BACK

## 2017-09-19 ASSESSMENT — PAIN SCALES - GENERAL: PAINLEVEL_OUTOF10: 9

## 2017-09-20 LAB — PATHOLOGIST REVIEW: NORMAL

## 2017-09-20 ASSESSMENT — ENCOUNTER SYMPTOMS
COUGH: 0
VOMITING: 0
BACK PAIN: 0
NAUSEA: 0
SHORTNESS OF BREATH: 0
ABDOMINAL PAIN: 0

## 2017-10-24 ENCOUNTER — OFFICE VISIT (OUTPATIENT)
Dept: GASTROENTEROLOGY | Age: 72
End: 2017-10-24
Payer: COMMERCIAL

## 2017-10-24 VITALS
DIASTOLIC BLOOD PRESSURE: 87 MMHG | HEIGHT: 62 IN | TEMPERATURE: 98.1 F | BODY MASS INDEX: 22.65 KG/M2 | OXYGEN SATURATION: 98 % | WEIGHT: 123.1 LBS | RESPIRATION RATE: 14 BRPM | SYSTOLIC BLOOD PRESSURE: 142 MMHG | HEART RATE: 68 BPM

## 2017-10-24 DIAGNOSIS — K62.1 SESSILE RECTAL POLYP: ICD-10-CM

## 2017-10-24 DIAGNOSIS — R19.7 DIARRHEA, UNSPECIFIED TYPE: Primary | ICD-10-CM

## 2017-10-24 DIAGNOSIS — K58.2 IRRITABLE BOWEL SYNDROME WITH BOTH CONSTIPATION AND DIARRHEA: ICD-10-CM

## 2017-10-24 DIAGNOSIS — F10.11 H/O ETOH ABUSE: ICD-10-CM

## 2017-10-24 PROCEDURE — 99214 OFFICE O/P EST MOD 30 MIN: CPT | Performed by: INTERNAL MEDICINE

## 2017-10-24 ASSESSMENT — ENCOUNTER SYMPTOMS
VOMITING: 0
NAUSEA: 0
ANAL BLEEDING: 0
ALLERGIC/IMMUNOLOGIC NEGATIVE: 1
EYES NEGATIVE: 1
COUGH: 1
TROUBLE SWALLOWING: 0
BACK PAIN: 1
RECTAL PAIN: 0
CONSTIPATION: 0
ABDOMINAL DISTENTION: 0
ABDOMINAL PAIN: 0
DIARRHEA: 1
BLOOD IN STOOL: 0

## 2017-10-24 NOTE — PROGRESS NOTES
Head: Normocephalic and atraumatic. Mouth/Throat: Oropharynx is clear and moist.   Eyes: Conjunctivae are normal. Pupils are equal, round, and reactive to light. Neck: Normal range of motion. Neck supple. Cardiovascular: Normal heart sounds and intact distal pulses. Pulmonary/Chest: Effort normal and breath sounds normal.   Abdominal: Soft. Bowel sounds are normal. There is tenderness. Musculoskeletal: Normal range of motion. Neurological: She is alert and oriented to person, place, and time. Skin: Skin is warm. Psychiatric: Her behavior is normal.     Reviewed and agree  Assessment:      As above      Plan:      Cont PPI    Quit ETOH    Quit Smoking    The patient was instructed to start taking some OTC Probiotics products   These are available over the counter at the Pharmacy stores and Grocery stores  He was explained about the beneficial effects they have in the GI track  They will help to establish the good bacterial mahin and will help with the digestion and bowel movements  The patient has verbalized understanding and agreement to this plan    Pt was advised in detail about some life style and dietary modifications. She was advised about avoidance of caffeine, nicotine and chocolate. Pt was also told to stay away from any kind of fast foods, soda pops. She was also advised to avoid lots of spices, grease and fried food etc.     Instructions were also given about trying to arrange the timing, quality and quantity of food. Instructions were given about using ample amount of fiber including dietary and supplemental fiber either metamucil, bennafiber or citrucell etc.  Pt was advised about drinking ample amount of water without any colors or chemicals. Stress was given about regular exercise. Pt has verbalized understanding and agreement to these modifications.

## 2018-01-24 PROBLEM — D62 ANEMIA ASSOCIATED WITH ACUTE BLOOD LOSS: Status: RESOLVED | Noted: 2017-04-07 | Resolved: 2018-01-24

## 2018-01-24 PROBLEM — K92.1 HEMATOCHEZIA: Chronic | Status: RESOLVED | Noted: 2017-04-07 | Resolved: 2018-01-24

## 2018-01-25 ENCOUNTER — HOSPITAL ENCOUNTER (OUTPATIENT)
Age: 73
Discharge: HOME | End: 2018-01-25
Payer: COMMERCIAL

## 2018-01-25 LAB
-: ABNORMAL
AMORPHOUS: ABNORMAL
BACTERIA: ABNORMAL
BILIRUBIN URINE: NEGATIVE
CASTS UA: ABNORMAL /LPF
COLOR: YELLOW
COMMENT UA: ABNORMAL
CRYSTALS, UA: ABNORMAL /HPF
EPITHELIAL CELLS UA: ABNORMAL /HPF
GLUCOSE URINE: NEGATIVE
KETONES, URINE: NEGATIVE
LEUKOCYTE ESTERASE, URINE: ABNORMAL
MUCUS: ABNORMAL
NITRITE, URINE: POSITIVE
OTHER OBSERVATIONS UA: ABNORMAL
PH UA: 7.5 (ref 5–8)
PROTEIN UA: ABNORMAL
RBC UA: ABNORMAL /HPF
RENAL EPITHELIAL, UA: ABNORMAL /HPF
SPECIFIC GRAVITY UA: 1.01 (ref 1–1.03)
TRICHOMONAS: ABNORMAL
TURBIDITY: ABNORMAL
URINE HGB: ABNORMAL
UROBILINOGEN, URINE: NORMAL
WBC UA: ABNORMAL /HPF
YEAST: ABNORMAL

## 2018-01-25 PROCEDURE — 87186 SC STD MICRODIL/AGAR DIL: CPT

## 2018-01-25 PROCEDURE — 87077 CULTURE AEROBIC IDENTIFY: CPT

## 2018-01-25 PROCEDURE — 87086 URINE CULTURE/COLONY COUNT: CPT

## 2018-01-25 PROCEDURE — 81001 URINALYSIS AUTO W/SCOPE: CPT

## 2018-01-26 LAB
CULTURE: ABNORMAL
CULTURE: ABNORMAL
Lab: ABNORMAL
ORGANISM: ABNORMAL
SPECIMEN DESCRIPTION: ABNORMAL
SPECIMEN DESCRIPTION: ABNORMAL
STATUS: ABNORMAL

## 2018-02-28 ENCOUNTER — APPOINTMENT (OUTPATIENT)
Dept: CT IMAGING | Age: 73
DRG: 184 | End: 2018-02-28
Payer: COMMERCIAL

## 2018-02-28 ENCOUNTER — HOSPITAL ENCOUNTER (INPATIENT)
Age: 73
LOS: 5 days | Discharge: NURSING, REHAB OR LONG-TERM CARE | DRG: 184 | End: 2018-03-05
Attending: EMERGENCY MEDICINE | Admitting: SURGERY
Payer: COMMERCIAL

## 2018-02-28 ENCOUNTER — APPOINTMENT (OUTPATIENT)
Dept: GENERAL RADIOLOGY | Age: 73
DRG: 184 | End: 2018-02-28
Payer: COMMERCIAL
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
## 2018-02-28 DIAGNOSIS — S22.42XA CLOSED FRACTURE OF MULTIPLE RIBS OF LEFT SIDE, INITIAL ENCOUNTER: Primary | ICD-10-CM

## 2018-02-28 DIAGNOSIS — R53.1 GENERAL WEAKNESS: ICD-10-CM

## 2018-02-28 PROBLEM — S22.49XA MULTIPLE CLOSED FRACTURES OF RIBS: Status: ACTIVE | Noted: 2018-02-28

## 2018-02-28 LAB
ABSOLUTE EOS #: 0 K/UL (ref 0–0.4)
ABSOLUTE IMMATURE GRANULOCYTE: ABNORMAL K/UL (ref 0–0.3)
ABSOLUTE LYMPH #: 0.66 K/UL (ref 1–4.8)
ABSOLUTE MONO #: 0.66 K/UL (ref 0.1–1.3)
ALBUMIN SERPL-MCNC: 4 G/DL (ref 3.5–5.2)
ALBUMIN/GLOBULIN RATIO: ABNORMAL (ref 1–2.5)
ALP BLD-CCNC: 118 U/L (ref 35–104)
ALT SERPL-CCNC: 17 U/L (ref 5–33)
ANION GAP SERPL CALCULATED.3IONS-SCNC: 13 MMOL/L (ref 9–17)
AST SERPL-CCNC: 27 U/L
BASOPHILS # BLD: 0 % (ref 0–2)
BASOPHILS ABSOLUTE: 0 K/UL (ref 0–0.2)
BILIRUB SERPL-MCNC: 0.56 MG/DL (ref 0.3–1.2)
BILIRUBIN URINE: NEGATIVE
BUN BLDV-MCNC: 8 MG/DL (ref 8–23)
BUN/CREAT BLD: ABNORMAL (ref 9–20)
CALCIUM SERPL-MCNC: 8.9 MG/DL (ref 8.6–10.4)
CHLORIDE BLD-SCNC: 95 MMOL/L (ref 98–107)
CO2: 26 MMOL/L (ref 20–31)
COLOR: YELLOW
COMMENT UA: ABNORMAL
CREAT SERPL-MCNC: 0.49 MG/DL (ref 0.5–0.9)
DIFFERENTIAL TYPE: ABNORMAL
EKG ATRIAL RATE: 80 BPM
EKG P AXIS: 32 DEGREES
EKG P-R INTERVAL: 134 MS
EKG Q-T INTERVAL: 408 MS
EKG QRS DURATION: 64 MS
EKG QTC CALCULATION (BAZETT): 470 MS
EKG R AXIS: -20 DEGREES
EKG T AXIS: -132 DEGREES
EKG VENTRICULAR RATE: 80 BPM
EOSINOPHILS RELATIVE PERCENT: 0 % (ref 0–4)
ETHANOL PERCENT: <0.01 %
ETHANOL: <10 MG/DL
GFR AFRICAN AMERICAN: >60 ML/MIN
GFR NON-AFRICAN AMERICAN: >60 ML/MIN
GFR SERPL CREATININE-BSD FRML MDRD: ABNORMAL ML/MIN/{1.73_M2}
GFR SERPL CREATININE-BSD FRML MDRD: ABNORMAL ML/MIN/{1.73_M2}
GLUCOSE BLD-MCNC: 86 MG/DL (ref 70–99)
GLUCOSE URINE: NEGATIVE
HCT VFR BLD CALC: 41.7 % (ref 36–46)
HEMOGLOBIN: 14 G/DL (ref 12–16)
IMMATURE GRANULOCYTES: ABNORMAL %
INR BLD: 1.1
KETONES, URINE: NEGATIVE
LEUKOCYTE ESTERASE, URINE: NEGATIVE
LYMPHOCYTES # BLD: 9 % (ref 24–44)
MAGNESIUM: 1.7 MG/DL (ref 1.6–2.6)
MCH RBC QN AUTO: 42.9 PG (ref 26–34)
MCHC RBC AUTO-ENTMCNC: 33.6 G/DL (ref 31–37)
MCV RBC AUTO: 127.9 FL (ref 80–100)
MONOCYTES # BLD: 9 % (ref 1–7)
MORPHOLOGY: ABNORMAL
NITRITE, URINE: NEGATIVE
NRBC AUTOMATED: ABNORMAL PER 100 WBC
PDW BLD-RTO: 15.9 % (ref 11.5–14.9)
PH UA: 6 (ref 5–8)
PLATELET # BLD: 280 K/UL (ref 150–450)
PLATELET ESTIMATE: ABNORMAL
PMV BLD AUTO: 8 FL (ref 6–12)
POTASSIUM SERPL-SCNC: 4.3 MMOL/L (ref 3.7–5.3)
PROTEIN UA: NEGATIVE
PROTHROMBIN TIME: 11 SEC (ref 9.7–12)
RBC # BLD: 3.26 M/UL (ref 4–5.2)
RBC # BLD: ABNORMAL 10*6/UL
SEG NEUTROPHILS: 82 % (ref 36–66)
SEGMENTED NEUTROPHILS ABSOLUTE COUNT: 5.98 K/UL (ref 1.3–9.1)
SODIUM BLD-SCNC: 134 MMOL/L (ref 135–144)
SPECIFIC GRAVITY UA: 1.05 (ref 1–1.03)
TOTAL PROTEIN: 6.5 G/DL (ref 6.4–8.3)
TROPONIN INTERP: NORMAL
TROPONIN INTERP: NORMAL
TROPONIN T: <0.03 NG/ML
TROPONIN T: <0.03 NG/ML
TURBIDITY: CLEAR
URINE HGB: NEGATIVE
UROBILINOGEN, URINE: NORMAL
WBC # BLD: 7.3 K/UL (ref 3.5–11)
WBC # BLD: ABNORMAL 10*3/UL

## 2018-02-28 PROCEDURE — 6360000002 HC RX W HCPCS: Performed by: EMERGENCY MEDICINE

## 2018-02-28 PROCEDURE — 2580000003 HC RX 258: Performed by: EMERGENCY MEDICINE

## 2018-02-28 PROCEDURE — 99285 EMERGENCY DEPT VISIT HI MDM: CPT

## 2018-02-28 PROCEDURE — 85025 COMPLETE CBC W/AUTO DIFF WBC: CPT

## 2018-02-28 PROCEDURE — 71260 CT THORAX DX C+: CPT

## 2018-02-28 PROCEDURE — 71045 X-RAY EXAM CHEST 1 VIEW: CPT

## 2018-02-28 PROCEDURE — 80053 COMPREHEN METABOLIC PANEL: CPT

## 2018-02-28 PROCEDURE — 70450 CT HEAD/BRAIN W/O DYE: CPT

## 2018-02-28 PROCEDURE — 85610 PROTHROMBIN TIME: CPT

## 2018-02-28 PROCEDURE — 96374 THER/PROPH/DIAG INJ IV PUSH: CPT

## 2018-02-28 PROCEDURE — 6360000004 HC RX CONTRAST MEDICATION: Performed by: EMERGENCY MEDICINE

## 2018-02-28 PROCEDURE — 74177 CT ABD & PELVIS W/CONTRAST: CPT

## 2018-02-28 PROCEDURE — 94664 DEMO&/EVAL PT USE INHALER: CPT

## 2018-02-28 PROCEDURE — 83735 ASSAY OF MAGNESIUM: CPT

## 2018-02-28 PROCEDURE — 6370000000 HC RX 637 (ALT 250 FOR IP): Performed by: FAMILY MEDICINE

## 2018-02-28 PROCEDURE — 93005 ELECTROCARDIOGRAM TRACING: CPT

## 2018-02-28 PROCEDURE — 1200000000 HC SEMI PRIVATE

## 2018-02-28 PROCEDURE — 81003 URINALYSIS AUTO W/O SCOPE: CPT

## 2018-02-28 PROCEDURE — 72125 CT NECK SPINE W/O DYE: CPT

## 2018-02-28 PROCEDURE — 36415 COLL VENOUS BLD VENIPUNCTURE: CPT

## 2018-02-28 PROCEDURE — 2580000003 HC RX 258: Performed by: SURGERY

## 2018-02-28 PROCEDURE — G0480 DRUG TEST DEF 1-7 CLASSES: HCPCS

## 2018-02-28 PROCEDURE — 84484 ASSAY OF TROPONIN QUANT: CPT

## 2018-02-28 RX ORDER — ALBUTEROL SULFATE 90 UG/1
2 AEROSOL, METERED RESPIRATORY (INHALATION) EVERY 4 HOURS PRN
Status: DISCONTINUED | OUTPATIENT
Start: 2018-02-28 | End: 2018-03-06 | Stop reason: HOSPADM

## 2018-02-28 RX ORDER — FOLIC ACID 1 MG/1
1 TABLET ORAL DAILY
Status: DISCONTINUED | OUTPATIENT
Start: 2018-02-28 | End: 2018-03-06 | Stop reason: HOSPADM

## 2018-02-28 RX ORDER — DEXTROSE, SODIUM CHLORIDE, AND POTASSIUM CHLORIDE 5; .45; .15 G/100ML; G/100ML; G/100ML
INJECTION INTRAVENOUS CONTINUOUS
Status: DISCONTINUED | OUTPATIENT
Start: 2018-02-28 | End: 2018-02-28

## 2018-02-28 RX ORDER — SODIUM CHLORIDE 0.9 % (FLUSH) 0.9 %
10 SYRINGE (ML) INJECTION EVERY 12 HOURS SCHEDULED
Status: DISCONTINUED | OUTPATIENT
Start: 2018-02-28 | End: 2018-03-02

## 2018-02-28 RX ORDER — MORPHINE SULFATE 4 MG/ML
4 INJECTION, SOLUTION INTRAMUSCULAR; INTRAVENOUS ONCE
Status: COMPLETED | OUTPATIENT
Start: 2018-02-28 | End: 2018-02-28

## 2018-02-28 RX ORDER — HYDROCODONE BITARTRATE AND ACETAMINOPHEN 5; 325 MG/1; MG/1
1 TABLET ORAL EVERY 4 HOURS PRN
Status: DISCONTINUED | OUTPATIENT
Start: 2018-02-28 | End: 2018-03-03

## 2018-02-28 RX ORDER — PROPRANOLOL HYDROCHLORIDE 10 MG/1
10 TABLET ORAL 3 TIMES DAILY
Status: DISCONTINUED | OUTPATIENT
Start: 2018-02-28 | End: 2018-03-06 | Stop reason: HOSPADM

## 2018-02-28 RX ORDER — SODIUM CHLORIDE 0.9 % (FLUSH) 0.9 %
10 SYRINGE (ML) INJECTION PRN
Status: DISCONTINUED | OUTPATIENT
Start: 2018-02-28 | End: 2018-02-28

## 2018-02-28 RX ORDER — 0.9 % SODIUM CHLORIDE 0.9 %
100 INTRAVENOUS SOLUTION INTRAVENOUS ONCE
Status: DISCONTINUED | OUTPATIENT
Start: 2018-02-28 | End: 2018-02-28

## 2018-02-28 RX ORDER — CITALOPRAM 40 MG/1
40 TABLET ORAL DAILY
Status: DISCONTINUED | OUTPATIENT
Start: 2018-02-28 | End: 2018-03-06 | Stop reason: HOSPADM

## 2018-02-28 RX ORDER — SODIUM CHLORIDE 0.9 % (FLUSH) 0.9 %
10 SYRINGE (ML) INJECTION PRN
Status: DISCONTINUED | OUTPATIENT
Start: 2018-02-28 | End: 2018-03-02

## 2018-02-28 RX ORDER — CLONAZEPAM 0.5 MG/1
0.5 TABLET ORAL 3 TIMES DAILY PRN
COMMUNITY
End: 2019-07-29 | Stop reason: SDUPTHER

## 2018-02-28 RX ORDER — 0.9 % SODIUM CHLORIDE 0.9 %
100 INTRAVENOUS SOLUTION INTRAVENOUS ONCE
Status: COMPLETED | OUTPATIENT
Start: 2018-02-28 | End: 2018-02-28

## 2018-02-28 RX ORDER — 0.9 % SODIUM CHLORIDE 0.9 %
500 INTRAVENOUS SOLUTION INTRAVENOUS ONCE
Status: COMPLETED | OUTPATIENT
Start: 2018-02-28 | End: 2018-02-28

## 2018-02-28 RX ORDER — CLONAZEPAM 0.5 MG/1
0.5 TABLET ORAL 3 TIMES DAILY PRN
Status: DISCONTINUED | OUTPATIENT
Start: 2018-02-28 | End: 2018-03-06 | Stop reason: HOSPADM

## 2018-02-28 RX ORDER — LANOLIN ALCOHOL/MO/W.PET/CERES
1000 CREAM (GRAM) TOPICAL DAILY
Status: DISCONTINUED | OUTPATIENT
Start: 2018-02-28 | End: 2018-03-06 | Stop reason: HOSPADM

## 2018-02-28 RX ORDER — LEVOTHYROXINE SODIUM 0.05 MG/1
50 TABLET ORAL DAILY
Status: DISCONTINUED | OUTPATIENT
Start: 2018-02-28 | End: 2018-03-06 | Stop reason: HOSPADM

## 2018-02-28 RX ORDER — PANTOPRAZOLE SODIUM 40 MG/1
40 TABLET, DELAYED RELEASE ORAL
Status: DISCONTINUED | OUTPATIENT
Start: 2018-03-01 | End: 2018-03-06 | Stop reason: HOSPADM

## 2018-02-28 RX ADMIN — Medication 10 ML: at 14:44

## 2018-02-28 RX ADMIN — SODIUM CHLORIDE 100 ML: 9 INJECTION, SOLUTION INTRAVENOUS at 14:44

## 2018-02-28 RX ADMIN — SODIUM CHLORIDE 500 ML: 9 INJECTION, SOLUTION INTRAVENOUS at 13:40

## 2018-02-28 RX ADMIN — HYDROCODONE BITARTRATE AND ACETAMINOPHEN 1 TABLET: 5; 325 TABLET ORAL at 18:46

## 2018-02-28 RX ADMIN — Medication 10 ML: at 21:13

## 2018-02-28 RX ADMIN — MORPHINE SULFATE 4 MG: 4 INJECTION, SOLUTION INTRAMUSCULAR; INTRAVENOUS at 15:59

## 2018-02-28 RX ADMIN — CLONAZEPAM 0.5 MG: 0.5 TABLET ORAL at 18:46

## 2018-02-28 RX ADMIN — IOPAMIDOL 100 ML: 755 INJECTION, SOLUTION INTRAVENOUS at 14:44

## 2018-02-28 RX ADMIN — MORPHINE SULFATE 4 MG: 4 INJECTION, SOLUTION INTRAMUSCULAR; INTRAVENOUS at 14:08

## 2018-02-28 ASSESSMENT — PAIN SCALES - GENERAL
PAINLEVEL_OUTOF10: 8
PAINLEVEL_OUTOF10: 9
PAINLEVEL_OUTOF10: 9
PAINLEVEL_OUTOF10: 4
PAINLEVEL_OUTOF10: 7
PAINLEVEL_OUTOF10: 8
PAINLEVEL_OUTOF10: 8

## 2018-02-28 ASSESSMENT — ENCOUNTER SYMPTOMS
VOMITING: 0
ABDOMINAL PAIN: 0
NAUSEA: 0

## 2018-02-28 ASSESSMENT — PAIN DESCRIPTION - ORIENTATION
ORIENTATION: LEFT

## 2018-02-28 ASSESSMENT — PAIN DESCRIPTION - LOCATION
LOCATION: RIB CAGE

## 2018-02-28 ASSESSMENT — PAIN DESCRIPTION - DESCRIPTORS
DESCRIPTORS: CONSTANT;ACHING;TENDER;SORE
DESCRIPTORS: ACHING;SHARP

## 2018-02-28 ASSESSMENT — PAIN DESCRIPTION - PAIN TYPE
TYPE: ACUTE PAIN

## 2018-02-28 ASSESSMENT — PAIN DESCRIPTION - PROGRESSION: CLINICAL_PROGRESSION: GRADUALLY IMPROVING

## 2018-02-28 NOTE — ED PROVIDER NOTES
related   findings, as above. Colonic diverticulosis without wall thickening or other evidence   diverticulitis. Cholelithiasis and other unchanged findings including calcified splenic   artery aneurysm, as above. RECOMMENDATIONS:   Consider MRI lumbosacral spine if acute finding at L2 is suspected. CT CHEST W CONTRAST   Final Result   Nondisplaced left 10th and 11th acute rib fractures and mildly displaced left   12th acute rib fracture. There is an additional nondisplaced subacute left   10th rib fracture. Trace left pleural effusion (presumably related to overlying rib fractures),   possibly a hemothorax although attenuation values difficult to measure given   small size. No pneumothorax. Fatty liver. CT Cervical Spine WO Contrast   Final Result   No acute abnormality of the cervical spine. CT Head WO Contrast   Final Result   Unchanged appearance of the brain without acute intracranial process   identified. LABS: All lab results were reviewed by myself, and all abnormals are listed below. Labs Reviewed   CBC WITH AUTO DIFFERENTIAL - Abnormal; Notable for the following:        Result Value    RBC 3.26 (*)     .9 (*)     MCH 42.9 (*)     RDW 15.9 (*)     Seg Neutrophils 82 (*)     Lymphocytes 9 (*)     Monocytes 9 (*)     Absolute Lymph # 0.66 (*)     All other components within normal limits   COMPREHENSIVE METABOLIC PANEL - Abnormal; Notable for the following:     CREATININE 0.49 (*)     Sodium 134 (*)     Chloride 95 (*)     Alkaline Phosphatase 118 (*)     All other components within normal limits   ETHANOL   MAGNESIUM   PROTIME-INR   TROPONIN   TROPONIN   PERIPHERAL BLOOD SMEAR, PATH REVIEW   URINALYSIS     EMERGENCY DEPARTMENT COURSE:     The patient vitals are shown below, and is given primary care follow up for blood pressure check. See [facility] for follow up instructions.     Vitals:    Vitals:    02/28/18 1447 02/28/18 1500 02/28/18 1601

## 2018-02-28 NOTE — ED NOTES
Bed: 09  Expected date:   Expected time:   Means of arrival:   Comments:  paris Bradshaw RN  02/28/18 6125

## 2018-03-01 LAB
ABSOLUTE EOS #: 0.07 K/UL (ref 0–0.4)
ABSOLUTE IMMATURE GRANULOCYTE: ABNORMAL K/UL (ref 0–0.3)
ABSOLUTE LYMPH #: 1.3 K/UL (ref 1–4.8)
ABSOLUTE MONO #: 0.78 K/UL (ref 0.1–1.3)
BASOPHILS # BLD: 1 % (ref 0–2)
BASOPHILS ABSOLUTE: 0.07 K/UL (ref 0–0.2)
DIFFERENTIAL TYPE: ABNORMAL
EOSINOPHILS RELATIVE PERCENT: 1 % (ref 0–4)
HCT VFR BLD CALC: 41 % (ref 36–46)
HEMOGLOBIN: 13.9 G/DL (ref 12–16)
IMMATURE GRANULOCYTES: ABNORMAL %
LYMPHOCYTES # BLD: 20 % (ref 24–44)
MCH RBC QN AUTO: 43.5 PG (ref 26–34)
MCHC RBC AUTO-ENTMCNC: 33.8 G/DL (ref 31–37)
MCV RBC AUTO: 128.6 FL (ref 80–100)
MONOCYTES # BLD: 12 % (ref 1–7)
MORPHOLOGY: ABNORMAL
NRBC AUTOMATED: ABNORMAL PER 100 WBC
PATHOLOGIST REVIEW: NORMAL
PATHOLOGIST REVIEW: NORMAL
PDW BLD-RTO: 16.1 % (ref 11.5–14.9)
PLATELET # BLD: 273 K/UL (ref 150–450)
PLATELET ESTIMATE: ABNORMAL
PMV BLD AUTO: 8 FL (ref 6–12)
RBC # BLD: 3.19 M/UL (ref 4–5.2)
RBC # BLD: ABNORMAL 10*6/UL
SEG NEUTROPHILS: 66 % (ref 36–66)
SEGMENTED NEUTROPHILS ABSOLUTE COUNT: 4.28 K/UL (ref 1.3–9.1)
WBC # BLD: 6.5 K/UL (ref 3.5–11)
WBC # BLD: ABNORMAL 10*3/UL

## 2018-03-01 PROCEDURE — G8979 MOBILITY GOAL STATUS: HCPCS

## 2018-03-01 PROCEDURE — 6370000000 HC RX 637 (ALT 250 FOR IP): Performed by: SURGERY

## 2018-03-01 PROCEDURE — 6370000000 HC RX 637 (ALT 250 FOR IP): Performed by: FAMILY MEDICINE

## 2018-03-01 PROCEDURE — 36415 COLL VENOUS BLD VENIPUNCTURE: CPT

## 2018-03-01 PROCEDURE — 2580000003 HC RX 258: Performed by: SURGERY

## 2018-03-01 PROCEDURE — 2580000003 HC RX 258: Performed by: FAMILY MEDICINE

## 2018-03-01 PROCEDURE — 94664 DEMO&/EVAL PT USE INHALER: CPT

## 2018-03-01 PROCEDURE — 97116 GAIT TRAINING THERAPY: CPT

## 2018-03-01 PROCEDURE — 97165 OT EVAL LOW COMPLEX 30 MIN: CPT

## 2018-03-01 PROCEDURE — 85025 COMPLETE CBC W/AUTO DIFF WBC: CPT

## 2018-03-01 PROCEDURE — 97530 THERAPEUTIC ACTIVITIES: CPT

## 2018-03-01 PROCEDURE — G8978 MOBILITY CURRENT STATUS: HCPCS

## 2018-03-01 PROCEDURE — 1200000000 HC SEMI PRIVATE

## 2018-03-01 PROCEDURE — 97162 PT EVAL MOD COMPLEX 30 MIN: CPT

## 2018-03-01 PROCEDURE — 99232 SBSQ HOSP IP/OBS MODERATE 35: CPT | Performed by: FAMILY MEDICINE

## 2018-03-01 RX ORDER — LORAZEPAM 1 MG/1
3 TABLET ORAL
Status: DISCONTINUED | OUTPATIENT
Start: 2018-03-01 | End: 2018-03-03

## 2018-03-01 RX ORDER — LORAZEPAM 1 MG/1
4 TABLET ORAL
Status: DISCONTINUED | OUTPATIENT
Start: 2018-03-01 | End: 2018-03-03

## 2018-03-01 RX ORDER — SODIUM CHLORIDE 0.9 % (FLUSH) 0.9 %
10 SYRINGE (ML) INJECTION PRN
Status: DISCONTINUED | OUTPATIENT
Start: 2018-03-01 | End: 2018-03-06 | Stop reason: HOSPADM

## 2018-03-01 RX ORDER — LORAZEPAM 2 MG/ML
3 INJECTION INTRAMUSCULAR
Status: DISCONTINUED | OUTPATIENT
Start: 2018-03-01 | End: 2018-03-03

## 2018-03-01 RX ORDER — NICOTINE 21 MG/24HR
1 PATCH, TRANSDERMAL 24 HOURS TRANSDERMAL DAILY
Status: DISCONTINUED | OUTPATIENT
Start: 2018-03-01 | End: 2018-03-06 | Stop reason: HOSPADM

## 2018-03-01 RX ORDER — LORAZEPAM 1 MG/1
2 TABLET ORAL
Status: DISCONTINUED | OUTPATIENT
Start: 2018-03-01 | End: 2018-03-03

## 2018-03-01 RX ORDER — ORPHENADRINE CITRATE 100 MG/1
100 TABLET, EXTENDED RELEASE ORAL 2 TIMES DAILY
Status: DISCONTINUED | OUTPATIENT
Start: 2018-03-01 | End: 2018-03-06 | Stop reason: HOSPADM

## 2018-03-01 RX ORDER — LORAZEPAM 2 MG/ML
2 INJECTION INTRAMUSCULAR
Status: DISCONTINUED | OUTPATIENT
Start: 2018-03-01 | End: 2018-03-03

## 2018-03-01 RX ORDER — LORAZEPAM 1 MG/1
1 TABLET ORAL
Status: DISCONTINUED | OUTPATIENT
Start: 2018-03-01 | End: 2018-03-03

## 2018-03-01 RX ORDER — LORAZEPAM 2 MG/ML
4 INJECTION INTRAMUSCULAR
Status: DISCONTINUED | OUTPATIENT
Start: 2018-03-01 | End: 2018-03-03

## 2018-03-01 RX ORDER — ORPHENADRINE CITRATE 100 MG/1
100 TABLET, EXTENDED RELEASE ORAL 2 TIMES DAILY
Status: DISCONTINUED | OUTPATIENT
Start: 2018-03-01 | End: 2018-03-01 | Stop reason: ALTCHOICE

## 2018-03-01 RX ORDER — FENTANYL CITRATE 50 UG/ML
25 INJECTION, SOLUTION INTRAMUSCULAR; INTRAVENOUS
Status: DISCONTINUED | OUTPATIENT
Start: 2018-03-01 | End: 2018-03-06 | Stop reason: HOSPADM

## 2018-03-01 RX ORDER — LORAZEPAM 2 MG/ML
1 INJECTION INTRAMUSCULAR
Status: DISCONTINUED | OUTPATIENT
Start: 2018-03-01 | End: 2018-03-03

## 2018-03-01 RX ORDER — SODIUM CHLORIDE 0.9 % (FLUSH) 0.9 %
10 SYRINGE (ML) INJECTION EVERY 12 HOURS SCHEDULED
Status: DISCONTINUED | OUTPATIENT
Start: 2018-03-01 | End: 2018-03-06 | Stop reason: HOSPADM

## 2018-03-01 RX ADMIN — LEVOTHYROXINE SODIUM 50 MCG: 50 TABLET ORAL at 05:35

## 2018-03-01 RX ADMIN — Medication 10 ML: at 10:07

## 2018-03-01 RX ADMIN — HYDROCODONE BITARTRATE AND ACETAMINOPHEN 1 TABLET: 5; 325 TABLET ORAL at 01:20

## 2018-03-01 RX ADMIN — FOLIC ACID 1 MG: 1 TABLET ORAL at 10:05

## 2018-03-01 RX ADMIN — PROPRANOLOL HYDROCHLORIDE 10 MG: 10 TABLET ORAL at 20:33

## 2018-03-01 RX ADMIN — HYDROCODONE BITARTRATE AND ACETAMINOPHEN 1 TABLET: 5; 325 TABLET ORAL at 17:19

## 2018-03-01 RX ADMIN — PROPRANOLOL HYDROCHLORIDE 10 MG: 10 TABLET ORAL at 12:11

## 2018-03-01 RX ADMIN — CITALOPRAM HYDROBROMIDE 40 MG: 40 TABLET ORAL at 10:05

## 2018-03-01 RX ADMIN — ORPHENADRINE CITRATE 100 MG: 100 TABLET, EXTENDED RELEASE ORAL at 14:00

## 2018-03-01 RX ADMIN — Medication 10 ML: at 12:12

## 2018-03-01 RX ADMIN — ORPHENADRINE CITRATE 100 MG: 100 TABLET, EXTENDED RELEASE ORAL at 20:33

## 2018-03-01 RX ADMIN — HYDROCODONE BITARTRATE AND ACETAMINOPHEN 1 TABLET: 5; 325 TABLET ORAL at 23:24

## 2018-03-01 RX ADMIN — CYANOCOBALAMIN TAB 1000 MCG 1000 MCG: 1000 TAB at 10:05

## 2018-03-01 RX ADMIN — PANTOPRAZOLE SODIUM 40 MG: 40 TABLET, DELAYED RELEASE ORAL at 05:35

## 2018-03-01 RX ADMIN — Medication 10 ML: at 20:33

## 2018-03-01 RX ADMIN — PROPRANOLOL HYDROCHLORIDE 10 MG: 10 TABLET ORAL at 15:55

## 2018-03-01 RX ADMIN — VITAMIN D, TAB 1000IU (100/BT) 1000 UNITS: 25 TAB at 10:05

## 2018-03-01 RX ADMIN — HYDROCODONE BITARTRATE AND ACETAMINOPHEN 1 TABLET: 5; 325 TABLET ORAL at 05:35

## 2018-03-01 ASSESSMENT — PAIN SCALES - GENERAL
PAINLEVEL_OUTOF10: 9
PAINLEVEL_OUTOF10: 8
PAINLEVEL_OUTOF10: 7
PAINLEVEL_OUTOF10: 8

## 2018-03-01 ASSESSMENT — PAIN DESCRIPTION - PROGRESSION
CLINICAL_PROGRESSION: NOT CHANGED
CLINICAL_PROGRESSION: NOT CHANGED

## 2018-03-01 ASSESSMENT — PAIN DESCRIPTION - PAIN TYPE
TYPE: ACUTE PAIN
TYPE: ACUTE PAIN

## 2018-03-01 ASSESSMENT — PAIN DESCRIPTION - FREQUENCY
FREQUENCY: CONTINUOUS
FREQUENCY: CONTINUOUS

## 2018-03-01 ASSESSMENT — PAIN DESCRIPTION - DESCRIPTORS
DESCRIPTORS: ACHING
DESCRIPTORS: ACHING

## 2018-03-01 ASSESSMENT — PAIN DESCRIPTION - ORIENTATION
ORIENTATION: LEFT
ORIENTATION: LEFT

## 2018-03-01 ASSESSMENT — PAIN DESCRIPTION - LOCATION
LOCATION: RIB CAGE
LOCATION: RIB CAGE

## 2018-03-01 NOTE — PLAN OF CARE
Problem: Falls - Risk of  Goal: Absence of falls  Outcome: Met This Shift  Uses call light appropriately so far this shift. Up with assist/walker. Bed alarm on. Problem: Pain:  Goal: Pain level will decrease  Pain level will decrease   Outcome: Ongoing  Norco x1 this shift for c/o discomfort post fall. Moderate relief stated. Goal: Control of chronic pain  Control of chronic pain   Outcome: Ongoing      Problem: Risk for Impaired Skin Integrity  Goal: Tissue integrity - skin and mucous membranes  Structural intactness and normal physiological function of skin and  mucous membranes.    Outcome: Ongoing

## 2018-03-01 NOTE — PROGRESS NOTES
Physical Therapy    Facility/Department: [facility] MED SURG  Initial Assessment    NAME: Anthony Hurtado  : 1945  MRN: [id number]    Date of Service: 3/1/2018    Patient Diagnosis(es): The primary encounter diagnosis was Closed fracture of multiple ribs of left side, initial encounter. Diagnoses of Fall, initial encounter and General weakness were also pertinent to this visit. has a past medical history of Alcoholism ([facility] [address] Anxiety; Cataract; Chronic pain; Depression; Hematochezia; Hypothyroid; Osteoporosis; Peptic ulcer of duodenum; Schatzki's ring; Sessile rectal polyp; Tobacco abuse; and Tubular adenoma of colon. has a past surgical history that includes hernia repair; Hysterectomy; Carpal tunnel release; Shoulder arthroscopy; Cataract removal; Tubal ligation; Kyphosis surgery (10/27/2016); Colonoscopy (04/10/2017); pr egd transoral biopsy single/multiple (N/A, 4/10/2017); pr colsc flx w/rmvl of tumor polyp lesion snare tq (N/A, 4/10/2017); and Upper gastrointestinal endoscopy (04/10/2017). Restrictions  Restrictions/Precautions  Restrictions/Precautions: Fall Risk, General Precautions (telesitter)  Required Braces or Orthoses?: Yes (Has been wearing sleeve on R knee)  Required Braces or Orthoses  Right Lower Extremity Brace:  (sleeve right knee)  Position Activity Restriction  Other position/activity restrictions: Telesitter in room for safety  Vision/Hearing  Vision: Impaired  Vision Exceptions: Wears glasses for reading  Hearing: Within functional limits     Subjective  General  Patient assessed for rehabilitation services?: Yes  Response To Previous Treatment: Not applicable  Family / Caregiver Present: Yes (spouse)  Referring Practitioner: Dr. Digna Montoya  Referral Date : 18  Diagnosis: multiple closed rib fractures, fall generalized weakness  Follows Commands: Within Functional Limits  General Comment  Comments: per chart, pt tripped and fell on threshold while retrieving paper.   Pt turned suddenly w/ cane and fell striking a coffee table. CT chest shows displaced left 12th rib fracture and nondisplaced 10th and 11th rib fractures. Chest X-ray states 9th and 10th rib fractures and ? 8th rib fracture  Pain Screening  Patient Currently in Pain: Yes  Pain Assessment  Pain Assessment: 0-10  Pain Level: 7  Pain Type: Acute pain  Pain Location: Rib cage  Pain Orientation: Left  Pain Descriptors: Aching  Pain Frequency: Continuous  Clinical Progression: Not changed  Pain Intervention(s): Repositioned; Ambulation/Increased activity  Response to Pain Intervention: Patient Satisfied  Multiple Pain Sites: No  Vital Signs  Patient Currently in Pain: Yes       Orientation  Orientation  Overall Orientation Status: Within Normal Limits    Social/Functional History  Social/Functional History  Lives With: Spouse  Type of Home: House  Home Layout: One level  Home Access: Stairs to enter with rails  Entrance Stairs - Number of Steps: 3 in front, 4 in back - prefers back cause they are wider with better railings  Entrance Stairs - Rails: Left  Bathroom Shower/Tub: Tub/Shower unit, Shower chair with back, Curtain  Bathroom Toilet: Standard  Bathroom Equipment: Grab bars in shower, Hand-held shower  Bathroom Accessibility: Walker accessible (Lynette Dumont fits into the BR but difficult to maneuver)  Home Equipment: Quad cane, 4 wheeled walker, Rolling walker, Lift chair, Alert Button, Reacher  Receives Help From: Home health (Aide 5 days/week, 2 hours/day)  ADL Assistance: Needs assistance (Gets A for shower and LB dressing)  Homemaking Responsibilities: No (Josh Gaitan aide does homemaking tasks)  Ambulation Assistance: Independent (w/4WW)  Transfer Assistance: Independent  Active : No  Patient's  Info: Spouse drives  Additional Comments: Pt reports that someone is home with her \"pretty much\" all the time - usually her spouse.   Objective     Observation/Palpation  Observation: resting in bed when therapist entered room    AROM RLE (degrees)  RLE AROM: WFL  AROM LLE (degrees)  LLE AROM : WFL  AROM RUE (degrees)  RUE General AROM: see OT for UE assessment  AROM LUE (degrees)  LUE General AROM: see OT for UE assessment  Strength RLE  Comment: 4/5  Strength LLE  Comment: 4/5  Strength RUE  Comment: see OT for UE assessment  Strength LUE  Comment: see OT for UE assessment     Sensation  Overall Sensation Status: Impaired (C/O numbness in the left arm)  Bed mobility  Supine to Sit: Modified independent  Scooting: Independent  Comment: dangles at the EOB w/ close supervision  Transfers  Sit to Stand: Contact guard assistance (verbal cues for hand placement)  Stand to sit: Minimal Assistance (verbal cues for hand placement, lowering assist from therapist for safety)  Stand Pivot Transfers: Contact guard assistance (used 4 w walker, verbal cues to turn completely w/ rollator and back completely to chair, attempts to sit prematurely (from 1/4 turn position) w/o backing completely)  Ambulation  Ambulation?: Yes  Ambulation 1  Surface: level tile  Device: Rollator  Assistance: Contact guard assistance  Quality of Gait: chair brought to patient due to C/O light headedness  Distance: 15'  Stairs/Curb  Stairs?: No     Balance  Sitting - Static: Good  Sitting - Dynamic: Good  Standing - Static: Fair;+ (used 4 w walker)  Standing - Dynamic: Fair (used 4 w walker)        Assessment   Body structures, Functions, Activity limitations: Decreased functional mobility ; Decreased balance;Decreased strength  Assessment: continue per POC to maximize potential for safe D/C home  Treatment Diagnosis: impaired mobility due to multiple rib fractures  Specific instructions for Next Treatment: 3-1-18 gait w/ w walker 15' x 1 w/ CGA x 1, FALL RISK  Prognosis: Good  Decision Making: Medium Complexity  History: presented to the ED w/ multiple left rib fractures following fall at home  Exam: ROM, MMT, balance and mobility assessments  Clinical Presentation: gait w/ w walker 15' x 1 w/ CGA x 1, FALL RISK  Patient Education: POC  Barriers to Learning: none  REQUIRES PT FOLLOW UP: Yes  Activity Tolerance  Activity Tolerance: Treatment limited secondary to medical complications (free text) (C/O lightheadedness limiting gait)  PT Equipment Recommendations  Equipment Needed: No     Discharge Recommendations:  [address]  Times per week: daily x 3 days  Times per day: Daily  Specific instructions for Next Treatment: 3-1-18 gait w/ w walker 15' x 1 w/ CGA x 1, FALL RISK  Current Treatment Recommendations: Strengthening, Functional Mobility Training, Gait Training, Transfer Training, Safety Education & Training, Stair training, Patient/Caregiver Education & Training  Safety Devices  Type of devices: All fall risk precautions in place, Call light within reach, Patient at risk for falls, Gait belt, Chair alarm in place, Telesitter in use, Nurse notified, Left in chair (nurse Jamie)    G-Code  PT G-Codes  Functional Assessment Tool Used: [place] 6 click  Score: 18  Functional Limitation: Mobility: Walking and moving around  Mobility: Walking and Moving Around Current Status (): At least 40 percent but less than 60 percent impaired, limited or restricted  Mobility: Walking and Moving Around Goal Status ():  At least 40 percent but less than 60 percent impaired, limited or restricted  OutComes Score                                           AM-PAC Score  AM-PAC Inpatient Mobility Raw Score : 18  AM-PAC Inpatient T-Scale Score : 43.63  Mobility Inpatient CMS 0-100% Score: 46.58  Mobility Inpatient CMS G-Code Modifier : CK          Goals  Short term goals  Time Frame for Short term goals: 3 days  Short term goal 1: independent bed mobility  Short term goal 2: transfers using w walker w/ SBA x 1  Short term goal 3: gait w/ w walker 80' w/ SBA x 1  Short term goal 4: pt to demonstrate good technique w/ exercise program for strengthening and

## 2018-03-01 NOTE — PROGRESS NOTES
[facility]: NUZHAT LISA   Occupational Therapy Evaluation  Date: 3/1/18  Patient Name: Magalis Louis       Room:   MRN: [id number]  Account: [de-identified]   : 1945  (73 y.o.) Gender: female     Referring Practitioner: Dr. Juliann Mcardle  Diagnosis: Fall with multiple L side rib fractures  Additional Pertinent Hx: Pt is a daily drinker with hx of multiple falls    Treatment Diagnosis: Impaired self-care status  Past Medical History:  has a past medical history of Alcoholism ([address] Anxiety; Cataract; Chronic pain; Depression; Hematochezia; Hypothyroid; Osteoporosis; Peptic ulcer of duodenum; Schatzki's ring; Sessile rectal polyp; Tobacco abuse; and Tubular adenoma of colon. Past Surgical History:   has a past surgical history that includes hernia repair; Hysterectomy; Carpal tunnel release; Shoulder arthroscopy; Cataract removal; Tubal ligation; Kyphosis surgery (10/27/2016); Colonoscopy (04/10/2017); pr egd transoral biopsy single/multiple (N/A, 4/10/2017); pr colsc flx w/rmvl of tumor polyp lesion snare tq (N/A, 4/10/2017); and Upper gastrointestinal endoscopy (04/10/2017). Restrictions  Restrictions/Precautions: Fall Risk, General Precautions (telesitter)  Other position/activity restrictions: Telesitter in room for safety  Position Activity Restriction  Other position/activity restrictions: Telesitter in room for safety  Required Braces or Orthoses  Right Lower Extremity Brace:  (sleeve right knee)  Required Braces or Orthoses?: Yes (Has been wearing sleeve on R knee)     Vitals  Temp: 98.1 °F (36.7 °C)  Pulse: 72  Resp: 16  BP: 125/73  Height: 5' 2\" (157.5 cm)  Weight: 123 lb 7.3 oz (56 kg)  BMI (Calculated): 22.6  Oxygen Therapy  SpO2: 94 %  O2 Device: None (Room air)  Level of Consciousness: Alert    Subjective  Subjective: \"It's cause I fall down\"  Comments: When asked why there was a telesitter in her room.   Overall Orientation Status: Impaired  Orientation Level: Oriented to person, Increased time to complete  LE Dressing: Minimal assistance, Moderate assistance  Toileting: Contact guard assistance, Minimal assistance    UE Function  LUE Strength  Gross LUE Strength: WFL  L Hand Grasp: 4-/5  LUE Strength Comment: Shoulder NT 2* rib fractures; Elbow 4-/5     LUE Tone: Normotonic     LUE AROM (degrees)  LUE AROM : WFL     Left Hand AROM (degrees)  Left Hand AROM: WFL  RUE Strength  Gross RUE Strength: WFL  R Hand Grasp: 4+/5  RUE Strength Comment: 4+/5 overall strength      RUE Tone: Normotonic     RUE AROM (degrees)  RUE AROM : WFL     Right Hand AROM (degrees)  Right Hand AROM: WFL    Fine Motor Skills  Coordination  Movements Are Fluid And Coordinated: No  Coordination and Movement description: Fine motor impairments, Left UE, Right UE, Tremors (Mild tremors noted - pt reports for ~1.5 years)     Mobility  Supine to Sit: Modified independent   Stand Pivot Transfers: Contact guard assistance   Balance  Sitting Balance: Supervision  Standing Balance: Contact guard assistance  Standing Balance  Sit to stand: Contact guard assistance  Stand to sit: Minimal assistance     Bed mobility  Supine to Sit: Modified independent  Scooting: Independent     Transfers  Stand Step Transfers: Contact guard assistance  Stand Pivot Transfers: Contact guard assistance  Sit to stand: Contact guard assistance  Stand to sit: Minimal assistance  Transfer Comments: w/RW; Poor safety awareness, cues for technique and hand placement  Functional Activity Tolerance  Functional Activity Tolerance:  Tolerates 10 - 20 min exercise with multiple rests   Assessment  Performance deficits / Impairments: Decreased functional mobility , Decreased ADL status, Decreased safe awareness, Decreased endurance, Decreased balance  Treatment Diagnosis: Impaired self-care status  Prognosis: Good  Decision Making: Low Complexity  Patient Education: OT POC, safety  REQUIRES OT FOLLOW UP: Yes  Discharge Recommendations: Subacute/Skilled Nursing Facility (vs home with 24 hour supervision and [facility])  Activity Tolerance: Patient limited by pain, Treatment limited secondary to medical complications (free text)  Activity Tolerance: Pt got lightheaded/dizzy after short walk to doorway and needed to sit down. Goals  Patient Goals   Patient goals : Return home with family support/A and HH aide  Short term goals  Time Frame for Short term goals: By Discharge  Short term goal 1: Pt will actively participate in self-care routines and complete tasks with SBA and Good safety. Short term goal 2: Pt will complete toilet transfer and toileting with SBA and Good safety. Short term goal 3: Pt will V/D good understanding of fall prevention strategies to utilize at home. Short term goal 4: Pt will actively participate in 15-20 minutes of therapeutic exercise/activity to promote increased independence and safety with self-care and mobility.     Plan  Safety Devices  Safety Devices in place: Yes  Type of devices: Patient at risk for falls, Gait belt, Left in chair, Call light within reach, Chair alarm in place, Nurse notified (Telesitter in room)     Plan  Times per week: 5-7  Times per day: Daily  Current Treatment Recommendations: Balance Training, Functional Mobility Training, Endurance Training, Cognitive Reorientation, Pain Management, Safety Education & Training, Patient/Caregiver Education & Training, Equipment Evaluation, Education, & procurement, Self-Care / ADL    Equipment Recommendations  Equipment Needed:  (TBD)  OT Individual Minutes  Time In: 2171  Time Out: 0640  Minutes: 26    Electronically signed by Kristopher Roman on 3/1/18 at 9:59 AM

## 2018-03-01 NOTE — H&P
mildly displaced left   12th acute rib fracture. There is an additional nondisplaced subacute left   10th rib fracture.       Trace left pleural effusion (presumably related to overlying rib fractures),   possibly a hemothorax although attenuation values difficult to measure given   small size. No pneumothorax.       Fatty liver.           CT Cervical Spine WO Contrast   Final Result   No acute abnormality of the cervical spine.           CT Head WO Contrast   Final Result   Unchanged appearance of the brain without acute intracranial process   identified. EKG:    Normal sinus rhythm, nonspecific changes, low voltage QRS, compared to old EKG and there is some change because it's low voltage now, no ST elevation or depression, ventricular rate 80 bpm, VT interval 134, QRS 64,     Labs:  CBC:   Recent Labs      02/28/18   1340  03/01/18   0549   WBC  7.3  6.5   HGB  14.0  13.9   PLT  280  273     BMP:    Recent Labs      02/28/18   1340   NA  134*   K  4.3   CL  95*   CO2  26   BUN  8   CREATININE  0.49*   GLUCOSE  86     Hepatic:   Recent Labs      02/28/18   1340   AST  27   ALT  17   BILITOT  0.56   ALKPHOS  118*     CARDIAC ENZY:   Recent Labs      02/28/18   1340  02/28/18   1552   TROPONINT  <0.03  <0.03     INR:   Recent Labs      02/28/18   1340   INR  1.1     BNP: No results for input(s): BNP in the last 72 hours. ABGs: No results found for: PHART, PO2ART, SRL5NUU  Lipids: No results for input(s): CHOL, HDL in the last 72 hours.     Invalid input(s): LDLCALCU  U/A:  Lab Results   Component Value Date    NITRITE neg 08/25/2017    COLORU YELLOW 02/28/2018    WBCUA TOO NUMEROUS TO COUNT 01/25/2018    RBCUA 5 TO 10 01/25/2018    MUCUS NOT REPORTED 01/25/2018    BACTERIA MANY 01/25/2018    SPECGRAV 1.053 02/28/2018    LEUKOCYTESUR NEGATIVE 02/28/2018    BLOODU small 08/25/2017    GLUCOSEU NEGATIVE 02/28/2018    AMORPHOUS NOT REPORTED 01/25/2018       PAST MEDICAL HISTORY     Past Medical History: Cardiovascular/Respiratory system:  No chest pain, palpitation, shortness of breath, coughing or expectoration. SOB coughing sneezing           Gastrointestinal tract: No abdominal pain, nausea, vomiting, or diarrhea. Constipation. Genitourinary:  No burning on micturition. No hesitancy, urgency, frequency or discoloration of urine. Locomotor:  No bone or joint pains. No swelling or deformities. Neuropsychiatric:  No referable complaints. Depressed, \"can't do a dam thing\"n nervous, tremors, tends to forget things. See HPI. GENERAL PHYSICAL EXAM:         Vitals: /73   Pulse 72   Temp 98.1 °F (36.7 °C) (Oral)   Resp 16   Ht 5' 2\" (1.575 m)   Wt 123 lb 7.3 oz (56 kg)   SpO2 94%   BMI 22.58 kg/m²  Body mass index is 22.58 kg/m². GENERAL APPEARANCE:  Mica Lundberg is 67 y.o.,  female, not obese, nourished, conscious, alert. Does not appear to be distress or pain at this time. Poor historian. Slight tremor left hand/arm. SKIN:  Warm, dry, no cyanosis or jaundice. HEAD:  Normocephalic, atraumatic, no swelling or tenderness. EYES:  Pupils equal, reactive to light, Conjunctiva is clear, EOMs intact miya. eyelids WNL. EARS:  No discharge, no marked hearing loss. NOSE:  No rhinorrhea, epistaxis or septal deformity. THROAT:  Not congested. No ulceration bleeding or discharge. NECK:  No stiffness, trachea central.  No palpable masses or L.N.      CHEST:  Symmetrical and equal on expansion. Pain in left chest.    HEART:  Regular rate and rhythm. S1 > S2, No audible murmurs or gallops. LUNGS:  Equal on expansion, diminished breath sounds. No adventitious sounds. ABDOMEN:    Soft on palpation. No localized tenderness. No guarding or rigidity. No palpable organomegaly. LYMPHATICS:  No palpable cervical Lymphadenopathy. LOCOMOTOR, BACK AND SPINE:  No tenderness or deformities. EXTREMITIES:  Symmetrical, no pedal edema.

## 2018-03-01 NOTE — H&P
Trauma Surgery History and Physical      Pt Name: Stanton Guillen  MRN: [id number]  YOB: 1945  Date of evaluation: 3/1/2018  Primary Care Physician: Keshav Argueta MD   Reason for evaluation: Status post fall. Left rib fractures    SUBJECTIVE:   History of Chief Complaint:    Stanton Guillen is a 67 y.o. female who presents with Left sided chest pain. The patient states that she fell on the porch. She normally uses a walker but was using her cane. She felt her left knee gave way and she fell hitting her side. She denies loss of consciousness. She denies drinking. Currently complaining of left sided chest wall and left-sided back pain. Denies shortness of breath. The patient actually drove herself to the emergency department and trauma protocol was followed. CT scans were done in the emergency department    Past Medical History   has a past medical history of Alcoholism ([facility] [address] Anxiety; Cataract; Chronic pain; Depression; Hematochezia; Hypothyroid; Osteoporosis; Peptic ulcer of duodenum; Schatzki's ring; Sessile rectal polyp; Tobacco abuse; and Tubular adenoma of colon. Past Surgical History   has a past surgical history that includes hernia repair; Hysterectomy; Carpal tunnel release; Shoulder arthroscopy; Cataract removal; Tubal ligation; Kyphosis surgery (10/27/2016); Colonoscopy (04/10/2017); pr egd transoral biopsy single/multiple (N/A, 4/10/2017); pr colsc flx w/rmvl of tumor polyp lesion snare tq (N/A, 4/10/2017); and Upper gastrointestinal endoscopy (04/10/2017). Medications  Prior to Admission medications    Medication Sig Start Date End Date Taking? Authorizing Provider   clonazePAM (KLONOPIN) 0.5 MG tablet Take 0.5 mg by mouth 3 times daily as needed (tremors) . Yes Historical Provider, MD   HYDROcodone-acetaminophen (NORCO) 5-325 MG per tablet Take 1 tablet by mouth every 8 hours as needed for Pain for up to 30 days.  2/19/18 3/21/18 Yes Keshav Argueta MD   citalopram 03/01/2018    BASOPCT 0.7 08/10/2017    MONOSABS 0.78 03/01/2018    LYMPHSABS 1.30 03/01/2018    EOSABS 0.07 03/01/2018    BASOSABS 0.07 03/01/2018    DIFFTYPE NOT REPORTED 03/01/2018     CMP:    Lab Results   Component Value Date     02/28/2018    K 4.3 02/28/2018    CL 95 02/28/2018    CO2 26 02/28/2018    BUN 8 02/28/2018    CREATININE 0.49 02/28/2018    GFRAA >60 02/28/2018    LABGLOM >60 02/28/2018    GLUCOSE 86 02/28/2018    GLUCOSE 76 08/28/2017    PROT 6.5 02/28/2018    LABALBU 4.0 02/28/2018    CALCIUM 8.9 02/28/2018    BILITOT 0.56 02/28/2018    ALKPHOS 118 02/28/2018    AST 27 02/28/2018    ALT 17 02/28/2018     Magnesium:    Lab Results   Component Value Date    MG 1.7 02/28/2018     PT/INR:    Lab Results   Component Value Date    PROTIME 11.0 02/28/2018    INR 1.1 02/28/2018     U/A:    Lab Results   Component Value Date    NITRITE neg 08/25/2017    COLORU YELLOW 02/28/2018    PROTEINU NEGATIVE 02/28/2018    PHUR 6.0 02/28/2018    WBCUA TOO NUMEROUS TO COUNT 01/25/2018    RBCUA 5 TO 10 01/25/2018    MUCUS NOT REPORTED 01/25/2018    TRICHOMONAS NOT REPORTED 01/25/2018    YEAST NOT REPORTED 01/25/2018    BACTERIA MANY 01/25/2018    SPECGRAV 1.053 02/28/2018    LEUKOCYTESUR NEGATIVE 02/28/2018    UROBILINOGEN Normal 02/28/2018    BILIRUBINUR NEGATIVE 02/28/2018    BILIRUBINUR neg 08/25/2017    BLOODU small 08/25/2017    GLUCOSEU NEGATIVE 02/28/2018    AMORPHOUS NOT REPORTED 01/25/2018     AMYLASE:    Lab Results   Component Value Date    AMYLASE 34 08/10/2017     LIPASE:    Lab Results   Component Value Date    LIPASE 36 08/10/2017       RADIOLOGY:   I have personally reviewed the following films:  CT head:  BRAIN/VENTRICLES: There is no acute intracranial hemorrhage, mass effect or  midline shift. No abnormal extra-axial fluid collection. There is mild  cortical atrophy noted. The brain is unchanged in appearance.     ORBITS: The visualized portion of the orbits demonstrate no acute uterus. No adnexal  mass or fluid collection. Peritoneum/Retroperitoneum: Unchanged calcific ASVD aorta and iliac arteries  without retroperitoneal hemorrhage, aneurysm or and acute abnormality. Bones/Soft Tissues: Bones mildly osteopenic, as previously. Convex-right  curvature and status post kyphoplasty L5 and L1 with residual height loss at  L1, unchanged. Mild stable height loss T12. Buckling anterosuperior aspect  L2, new since April 2017. Impression:      No intra-abdominal major organ injury, contrast extravasation or  hemoperitoneum. Multiple left-sided rib fractures (see separate CT chest report). Bibasilar  probable atelectatic changes ; minimal contusion a consideration. No  pneumothorax or sizable hemothorax. Eventration left hemidiaphragm but no  clear cut diaphragmatic hernia. Buckling anterosuperior aspect L2, new since 04/08/2017. Correlate  clinically for a possible acute finding. Multiple stable spine related  findings, as above. Colonic diverticulosis without wall thickening or other evidence  diverticulitis. Cholelithiasis and other unchanged findings including calcified splenic  artery aneurysm, as above. IMPRESSION:   1. Status post fall with multiple rib fractures on the left side. 2. History of alcoholism    Active Problems:    Multiple closed fractures of ribs  Resolved Problems:    * No resolved hospital problems. *      PLAN:   1. Pain control  2. DVT/GI prophylaxis  3. Resume home medications  4. Physical and occupational therapy evaluation and treatment  5. Discharge planning  6.  Consult PCP for medical management

## 2018-03-02 ENCOUNTER — APPOINTMENT (OUTPATIENT)
Dept: GENERAL RADIOLOGY | Age: 73
DRG: 184 | End: 2018-03-02
Payer: COMMERCIAL

## 2018-03-02 PROBLEM — S22.42XD CLOSED FRACTURE OF MULTIPLE RIBS OF LEFT SIDE WITH ROUTINE HEALING: Status: ACTIVE | Noted: 2018-02-28
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
## 2018-03-02 PROCEDURE — 71045 X-RAY EXAM CHEST 1 VIEW: CPT

## 2018-03-02 PROCEDURE — 6370000000 HC RX 637 (ALT 250 FOR IP): Performed by: FAMILY MEDICINE

## 2018-03-02 PROCEDURE — 97530 THERAPEUTIC ACTIVITIES: CPT

## 2018-03-02 PROCEDURE — 6370000000 HC RX 637 (ALT 250 FOR IP): Performed by: SURGERY

## 2018-03-02 PROCEDURE — 97110 THERAPEUTIC EXERCISES: CPT

## 2018-03-02 PROCEDURE — 2580000003 HC RX 258: Performed by: FAMILY MEDICINE

## 2018-03-02 PROCEDURE — 99232 SBSQ HOSP IP/OBS MODERATE 35: CPT | Performed by: FAMILY MEDICINE

## 2018-03-02 PROCEDURE — 97116 GAIT TRAINING THERAPY: CPT

## 2018-03-02 PROCEDURE — 1200000000 HC SEMI PRIVATE

## 2018-03-02 RX ADMIN — HYDROCODONE BITARTRATE AND ACETAMINOPHEN 1 TABLET: 5; 325 TABLET ORAL at 10:43

## 2018-03-02 RX ADMIN — LORAZEPAM 2 MG: 1 TABLET ORAL at 20:34

## 2018-03-02 RX ADMIN — PROPRANOLOL HYDROCHLORIDE 10 MG: 10 TABLET ORAL at 08:55

## 2018-03-02 RX ADMIN — CITALOPRAM HYDROBROMIDE 40 MG: 40 TABLET ORAL at 08:39

## 2018-03-02 RX ADMIN — CYANOCOBALAMIN TAB 1000 MCG 1000 MCG: 1000 TAB at 08:55

## 2018-03-02 RX ADMIN — Medication 10 ML: at 09:13

## 2018-03-02 RX ADMIN — LEVOTHYROXINE SODIUM 50 MCG: 50 TABLET ORAL at 06:13

## 2018-03-02 RX ADMIN — HYDROCODONE BITARTRATE AND ACETAMINOPHEN 1 TABLET: 5; 325 TABLET ORAL at 16:51

## 2018-03-02 RX ADMIN — ORPHENADRINE CITRATE 100 MG: 100 TABLET, EXTENDED RELEASE ORAL at 08:55

## 2018-03-02 RX ADMIN — PANTOPRAZOLE SODIUM 40 MG: 40 TABLET, DELAYED RELEASE ORAL at 06:13

## 2018-03-02 RX ADMIN — ORPHENADRINE CITRATE 100 MG: 100 TABLET, EXTENDED RELEASE ORAL at 20:29

## 2018-03-02 RX ADMIN — HYDROCODONE BITARTRATE AND ACETAMINOPHEN 1 TABLET: 5; 325 TABLET ORAL at 06:13

## 2018-03-02 RX ADMIN — FOLIC ACID 1 MG: 1 TABLET ORAL at 08:39

## 2018-03-02 RX ADMIN — VITAMIN D, TAB 1000IU (100/BT) 1000 UNITS: 25 TAB at 08:55

## 2018-03-02 RX ADMIN — Medication 10 ML: at 20:27

## 2018-03-02 RX ADMIN — PROPRANOLOL HYDROCHLORIDE 10 MG: 10 TABLET ORAL at 20:29

## 2018-03-02 RX ADMIN — LORAZEPAM 2 MG: 1 TABLET ORAL at 03:56

## 2018-03-02 RX ADMIN — PROPRANOLOL HYDROCHLORIDE 10 MG: 10 TABLET ORAL at 15:49

## 2018-03-02 ASSESSMENT — PAIN DESCRIPTION - DESCRIPTORS
DESCRIPTORS: SPASM
DESCRIPTORS: ACHING
DESCRIPTORS: ACHING

## 2018-03-02 ASSESSMENT — PAIN DESCRIPTION - PROGRESSION: CLINICAL_PROGRESSION: NOT CHANGED

## 2018-03-02 ASSESSMENT — PAIN DESCRIPTION - ORIENTATION
ORIENTATION: LEFT
ORIENTATION: MID
ORIENTATION: LEFT

## 2018-03-02 ASSESSMENT — PAIN DESCRIPTION - LOCATION
LOCATION: RIB CAGE
LOCATION: BACK
LOCATION: RIB CAGE

## 2018-03-02 ASSESSMENT — PAIN SCALES - GENERAL
PAINLEVEL_OUTOF10: 5
PAINLEVEL_OUTOF10: 4
PAINLEVEL_OUTOF10: 7
PAINLEVEL_OUTOF10: 8
PAINLEVEL_OUTOF10: 8
PAINLEVEL_OUTOF10: 6

## 2018-03-02 ASSESSMENT — PAIN DESCRIPTION - PAIN TYPE
TYPE: ACUTE PAIN
TYPE: ACUTE PAIN
TYPE: CHRONIC PAIN
TYPE: ACUTE PAIN
TYPE: ACUTE PAIN

## 2018-03-02 ASSESSMENT — PAIN SCALES - WONG BAKER: WONGBAKER_NUMERICALRESPONSE: 10

## 2018-03-02 ASSESSMENT — PAIN DESCRIPTION - FREQUENCY
FREQUENCY: CONTINUOUS
FREQUENCY: CONTINUOUS

## 2018-03-02 NOTE — PROGRESS NOTES
Training, Equipment Evaluation, Education, & procurement, Self-Care / ADL      Goals  Short term goals  Time Frame for Short term goals: By Discharge  Short term goal 1: Pt will actively participate in self-care routines and complete tasks with SBA and Good safety. Short term goal 2: Pt will complete toilet transfer and toileting with SBA and Good safety. Short term goal 3: Pt will V/D good understanding of fall prevention strategies to utilize at home. Short term goal 4: Pt will actively participate in 15-20 minutes of therapeutic exercise/activity to promote increased independence and safety with self-care and mobility.     OT Individual Minutes  Time In: 1100  Time Out: 7301  Minutes: 28      Electronically signed by NAVJOT Martinez on 3/2/18 at 1:05 PM

## 2018-03-02 NOTE — PROGRESS NOTES
Shaina from [facility] met with pt's  Sunil Gayle and daughter Sully Ordaz and answered questions. Facility will start pre-cert today but want to evaluate pt on Monday. Family asked that pt not be evaluated today. It will be helpful to have therapy see pt on Sunday so updated notes can be used for pre-cert. SW started 7000 in HENS.

## 2018-03-02 NOTE — PROGRESS NOTES
SW spoke to pt's  Tyra Baez and daughter Jewel Dowd ([phone]). They do not want pt to go home as they do not think she is safe. Family decided on [address]. SW sent referral and spoke to [facility]; she will start pre-cert today. Per Jewel Dowd, she is trying to get Tyra Deckermatthewdestini and pt in an assisted Living unit at [facility] in the near future.

## 2018-03-02 NOTE — PROGRESS NOTES
Discharge Recommendations:  Subacute/Skilled Nursing Facility    G-Code     OutComes Score                                                    AM-PAC Score  AM-PAC Inpatient Mobility Raw Score : 18  AM-PAC Inpatient T-Scale Score : 43.63  Mobility Inpatient CMS 0-100% Score: 46.58  Mobility Inpatient CMS G-Code Modifier : CK          Goals  Short term goals  Time Frame for Short term goals: 3 days  Short term goal 1: independent bed mobility  Short term goal 2: transfers using w walker w/ SBA x 1  Short term goal 3: gait w/ w walker 80' w/ SBA x 1  Short term goal 4: pt to demonstrate good technique w/ exercise program for strengthening and balance activites  Short term goal 5: pt to ascend/ descend 3 steps w/ left rail and AD w/ mod x 1  Patient Goals   Patient goals : did not state a goal    Plan    Plan  Times per week: daily x 3 days  Times per day: Daily  Specific instructions for Next Treatment: 3-2-18 gait w/ w walker 15' x 1 w/ CGA x 1,HIGH  FALL RISK, confused today- needs heavy verbal cues today, DTs  Current Treatment Recommendations: Strengthening, Functional Mobility Training, Gait Training, Transfer Training, Safety Education & Training, Stair training, Patient/Caregiver Education & Training  Safety Devices  Type of devices:  All fall risk precautions in place, Call light within reach, Patient at risk for falls, Gait belt, Chair alarm in place, Telesitter in use, Nurse notified, Left in chair (sitter and student nurse to assist w/ bath at the end of treatment)     Therapy Time   Individual Concurrent Group Co-treatment   Time In [facility], PT

## 2018-03-02 NOTE — PROGRESS NOTES
appropriate    Assessment and Plan:   1. Fall with rib fractures- up in chair, hopefully home later today  2.  alcoholism- CIWA scale in place.   Home soon       Patient Active Problem List:     Anorexia     Arthritis     Hypothyroidism     Memory loss     Menopause     Muscle weakness     Tremor     Other specified hypothyroidism     Chronic pain     Compressed spine fracture (HCC)     Closed fracture of second lumbar vertebra (HCC)     Closed fracture of ninth thoracic vertebra (HCC)     Menopausal osteoporosis     Alcoholism ([address]     Compression fx, lumbar spine (HCC)     Osteoporosis     Dysthymia     Medication management     Diarrhea     Dehydration     Hyponatremia     Hypokalemia     Electrolyte imbalance     Sessile rectal polyp     Tubular adenoma of colon     Schatzki's ring     Peptic ulcer of duodenum     History of Helicobacter pylori infection     Chronic obstructive pulmonary disease ([address]     Multiple closed fractures of ribs      Electronically signed by Roro Alonzo MD on 3/2/2018 at 8:28 AM

## 2018-03-03 PROCEDURE — 99232 SBSQ HOSP IP/OBS MODERATE 35: CPT | Performed by: FAMILY MEDICINE

## 2018-03-03 PROCEDURE — 2580000003 HC RX 258: Performed by: FAMILY MEDICINE

## 2018-03-03 PROCEDURE — 6370000000 HC RX 637 (ALT 250 FOR IP): Performed by: FAMILY MEDICINE

## 2018-03-03 PROCEDURE — 6360000002 HC RX W HCPCS: Performed by: SURGERY

## 2018-03-03 PROCEDURE — 97530 THERAPEUTIC ACTIVITIES: CPT

## 2018-03-03 PROCEDURE — 6370000000 HC RX 637 (ALT 250 FOR IP): Performed by: SURGERY

## 2018-03-03 PROCEDURE — 1200000000 HC SEMI PRIVATE

## 2018-03-03 PROCEDURE — 97535 SELF CARE MNGMENT TRAINING: CPT

## 2018-03-03 RX ORDER — ACETAMINOPHEN 325 MG/1
650 TABLET ORAL EVERY 4 HOURS PRN
Status: DISCONTINUED | OUTPATIENT
Start: 2018-03-03 | End: 2018-03-06 | Stop reason: HOSPADM

## 2018-03-03 RX ORDER — LIDOCAINE 50 MG/G
1 PATCH TOPICAL DAILY
Status: DISCONTINUED | OUTPATIENT
Start: 2018-03-03 | End: 2018-03-06 | Stop reason: HOSPADM

## 2018-03-03 RX ADMIN — Medication 10 ML: at 20:34

## 2018-03-03 RX ADMIN — ORPHENADRINE CITRATE 100 MG: 100 TABLET, EXTENDED RELEASE ORAL at 20:34

## 2018-03-03 RX ADMIN — CLONAZEPAM 0.5 MG: 0.5 TABLET ORAL at 08:01

## 2018-03-03 RX ADMIN — ALBUTEROL SULFATE 2 PUFF: 90 AEROSOL, METERED RESPIRATORY (INHALATION) at 16:00

## 2018-03-03 RX ADMIN — VITAMIN D, TAB 1000IU (100/BT) 1000 UNITS: 25 TAB at 08:01

## 2018-03-03 RX ADMIN — CLONAZEPAM 0.5 MG: 0.5 TABLET ORAL at 20:34

## 2018-03-03 RX ADMIN — PROPRANOLOL HYDROCHLORIDE 10 MG: 10 TABLET ORAL at 08:02

## 2018-03-03 RX ADMIN — CYANOCOBALAMIN TAB 1000 MCG 1000 MCG: 1000 TAB at 08:01

## 2018-03-03 RX ADMIN — ORPHENADRINE CITRATE 100 MG: 100 TABLET, EXTENDED RELEASE ORAL at 08:02

## 2018-03-03 RX ADMIN — ACETAMINOPHEN 650 MG: 325 TABLET, FILM COATED ORAL at 12:32

## 2018-03-03 RX ADMIN — LORAZEPAM 1 MG: 1 TABLET ORAL at 08:10

## 2018-03-03 RX ADMIN — Medication 10 ML: at 08:03

## 2018-03-03 RX ADMIN — HYDROCODONE BITARTRATE AND ACETAMINOPHEN 1 TABLET: 5; 325 TABLET ORAL at 08:10

## 2018-03-03 RX ADMIN — CITALOPRAM HYDROBROMIDE 40 MG: 40 TABLET ORAL at 08:01

## 2018-03-03 RX ADMIN — HYDROCODONE BITARTRATE AND ACETAMINOPHEN 1 TABLET: 5; 325 TABLET ORAL at 00:16

## 2018-03-03 RX ADMIN — FENTANYL CITRATE 25 MCG: 50 INJECTION INTRAMUSCULAR; INTRAVENOUS at 18:12

## 2018-03-03 RX ADMIN — PANTOPRAZOLE SODIUM 40 MG: 40 TABLET, DELAYED RELEASE ORAL at 06:17

## 2018-03-03 RX ADMIN — FOLIC ACID 1 MG: 1 TABLET ORAL at 08:01

## 2018-03-03 RX ADMIN — FENTANYL CITRATE 25 MCG: 50 INJECTION INTRAMUSCULAR; INTRAVENOUS at 20:34

## 2018-03-03 RX ADMIN — ACETAMINOPHEN 650 MG: 325 TABLET, FILM COATED ORAL at 17:06

## 2018-03-03 RX ADMIN — LEVOTHYROXINE SODIUM 50 MCG: 50 TABLET ORAL at 06:17

## 2018-03-03 RX ADMIN — PROPRANOLOL HYDROCHLORIDE 10 MG: 10 TABLET ORAL at 20:34

## 2018-03-03 ASSESSMENT — PAIN SCALES - GENERAL
PAINLEVEL_OUTOF10: 0
PAINLEVEL_OUTOF10: 9
PAINLEVEL_OUTOF10: 4
PAINLEVEL_OUTOF10: 5
PAINLEVEL_OUTOF10: 6
PAINLEVEL_OUTOF10: 8
PAINLEVEL_OUTOF10: 10
PAINLEVEL_OUTOF10: 0
PAINLEVEL_OUTOF10: 8
PAINLEVEL_OUTOF10: 9
PAINLEVEL_OUTOF10: 8

## 2018-03-03 ASSESSMENT — PAIN DESCRIPTION - FREQUENCY: FREQUENCY: INTERMITTENT

## 2018-03-03 ASSESSMENT — PAIN DESCRIPTION - LOCATION: LOCATION: RIB CAGE;BACK

## 2018-03-03 ASSESSMENT — PAIN DESCRIPTION - PROGRESSION: CLINICAL_PROGRESSION: NOT CHANGED

## 2018-03-03 ASSESSMENT — PAIN DESCRIPTION - PAIN TYPE: TYPE: ACUTE PAIN

## 2018-03-03 ASSESSMENT — PAIN DESCRIPTION - DESCRIPTORS: DESCRIPTORS: SHARP;SPASM

## 2018-03-03 NOTE — PROGRESS NOTES
Physical Therapy  Facility/Department: [facility] MED SURG  Daily Treatment Note  NAME: Carol Jo  : 1945  MRN: [id number]    Date of Service: 3/3/2018    Patient Diagnosis(es):   Patient Active Problem List    Diagnosis Date Noted    Fall 2018    Closed fracture of multiple ribs of left side with routine healing 2018    Chronic obstructive pulmonary disease ([address] 2017    History of Helicobacter pylori infection 2017    Schatzki's ring     Peptic ulcer of duodenum     Electrolyte imbalance 2017    Sessile rectal polyp 04/10/2017    Tubular adenoma of colon 04/10/2017    Diarrhea 2017    Dehydration 2017    Hyponatremia 2017    Hypokalemia 2017    Dysthymia 2017    Medication management 2017    Compression fx, lumbar spine ([address] 10/25/2016    Osteoporosis 10/25/2016    Compressed spine fracture ([address] 10/20/2016    Closed fracture of second lumbar vertebra (HCC) 10/20/2016    Closed fracture of ninth thoracic vertebra ([address] 10/20/2016    Menopausal osteoporosis 10/20/2016    Alcoholism ([address] 10/20/2016    Other specified hypothyroidism 2016    Chronic pain 2016    Anorexia 10/16/2015    Arthritis 10/16/2015    Hypothyroidism 10/16/2015    Memory loss 10/16/2015    Menopause 10/16/2015    Muscle weakness 10/16/2015    Tremor 10/16/2015       Past Medical History:   Diagnosis Date    Alcoholism ([address] 10/20/2016    Anxiety     Cataract     Chronic pain     Depression     Hematochezia 2017    Hypothyroid     Osteoporosis     Peptic ulcer of duodenum     chronic    Schatzki's ring     Sessile rectal polyp 04/10/2017    multi tiny    Tobacco abuse     Tubular adenoma of colon 04/10/2017    x2     Past Surgical History:   Procedure Laterality Date    CARPAL TUNNEL RELEASE      CATARACT REMOVAL      COLONOSCOPY  04/10/2017    multi tiny sessile polyps; poor prep; tubular adenoma x 2    HERNIA AM-PAC Score             Goals  Short term goals  Time Frame for Short term goals: 3 days  Short term goal 1: independent bed mobility  Short term goal 2: transfers using w walker w/ SBA x 1  Short term goal 3: gait w/ w walker 80' w/ SBA x 1  Short term goal 4: pt to demonstrate good technique w/ exercise program for strengthening and balance activites  Short term goal 5: pt to ascend/ descend 3 steps w/ left rail and AD w/ mod x 1  Patient Goals   Patient goals : did not state a goal    Plan    Plan  Times per week: daily x 3 days  Times per day: Daily  Specific instructions for Next Treatment: 3-2-18 gait w/ w walker 15' x 1 w/ CGA x 1,HIGH  FALL RISK, confused today- needs heavy verbal cues today, DTs  Current Treatment Recommendations: Strengthening, Functional Mobility Training, Gait Training, Transfer Training, Safety Education & Training, Stair training, Patient/Caregiver Education & Training  Safety Devices  Type of devices:  All fall risk precautions in place, Call light within reach, Patient at risk for falls, Gait belt, Chair alarm in place, Telesitter in use, Nurse notified, Left in chair (sitter and student nurse to assist w/ bath at the end of treatment)     Therapy Time   Individual Concurrent Group Co-treatment   Time In 1515         Time Out 1531         Minutes 16                 Dulce Mcclendon, PT

## 2018-03-03 NOTE — CARE COORDINATION
ONGOING DISCHARGE PLAN:    Plan remains for LSW to continue to follow for DC to [facility], per notes, need therapy notes, from weekend for Possible Percert on Monday. Will continue to follow for additional discharge needs.     Electronically signed by Bradley Thomas RN on 3/3/2018 at 12:54 PM

## 2018-03-03 NOTE — PROGRESS NOTES
[address]   INPATIENT OCCUPATIONAL THERAPY  PROGRESS NOTE  Date: 3/3/2018  Patient Name: Chester Koenig      Room:   MRN: [id number]    : 1945  (73 y.o.) Gender: female   Referring Practitioner: Dr. Nemesio Simons  Diagnosis: Fall with multiple L side rib fractures  General  Chart Reviewed: Yes  Patient assessed for rehabilitation services?: Yes  Additional Pertinent Hx: Pt is a daily drinker with hx of multiple falls  Family / Caregiver Present: Yes  Referring Practitioner: Dr. Nemesio Simons  Diagnosis: Fall with multiple L side rib fractures    Restrictions  Restrictions/Precautions: Fall Risk, General Precautions  Other position/activity restrictions: 1:1 in room for safety  Required Braces or Orthoses  Right Lower Extremity Brace:  (sleeve right knee)  Position Activity Restriction  Other position/activity restrictions: 1:1 in room for safety       Subjective  Subjective: \"I am stubborn\"  Comments: Pt having a very difficult time cutting pancakes but was resistant to A from OT. Patient Currently in Pain: Yes  Pain Level: 8  Pain Location: Rib cage;Back  Overall Orientation Status: Impaired  Orientation Level: Oriented to person;Oriented to place; Disoriented to time;Disoriented to situation    Objective  Cognition  Overall Cognitive Status: Impaired  Following Directions: Follows one step commands (Inconsistent)  Safety Judgement: Decreased awareness of need for assistance  Insights: Decreased awareness of deficits  Bed mobility  Comment: Pt received supine in bed, attempting to eat breakfast.  Pt very lethargic and had just received Ativan/Norco per RN. Mobility/transfers not completed this date. ADL  Feeding: Minimal assistance;Verbal cueing;Setup; Increased time to complete  Additional Comments: Pt very lethargic with increased confusion this date. Pt attempting to eat breakfast but required increased time and A to scoop food.   OT assisted with repositioning and set-up A to open packages/containers and cut up food. Pt with difficulty getting utensils to mouth, frequently hitting chin. OT provided Min A to properly place food in mouth. Assessment  Performance deficits / Impairments: Decreased functional mobility ; Decreased ADL status; Decreased safe awareness;Decreased endurance;Decreased balance  Assessment: Limited session completed this date 2* pt very lethargic and had just received Ativan/Norco per RN. Prognosis: Good  Discharge Recommendations: Subacute/Skilled Nursing Facility  Activity Tolerance: Patient limited by fatigue;Treatment limited secondary to decreased cognition  Safety Devices in place: Yes  Type of devices: Patient at risk for falls; Left in bed;Call light within reach (1:1 sitter in room)  Equipment Recommendations  Equipment Needed:  (TBD)     Plan  Safety Devices  Safety Devices in place: Yes  Type of devices: Patient at risk for falls, Left in bed, Call light within reach (1:1 sitter in room)  Plan  Times per week: 5-7  Times per day: Daily  Current Treatment Recommendations: Balance Training, Functional Mobility Training, Endurance Training, Cognitive Reorientation, Pain Management, Safety Education & Training, Patient/Caregiver Education & Training, Equipment Evaluation, Education, & procurement, Self-Care / ADL    Goals  Short term goals  Time Frame for Short term goals: By Discharge  Short term goal 1: Pt will actively participate in self-care routines and complete tasks with SBA and Good safety. Short term goal 2: Pt will complete toilet transfer and toileting with SBA and Good safety. Short term goal 3: Pt will V/D good understanding of fall prevention strategies to utilize at home. Short term goal 4: Pt will actively participate in 15-20 minutes of therapeutic exercise/activity to promote increased independence and safety with self-care and mobility.     OT Individual Minutes  Time In: 3864  Time Out: 2643  Minutes: 14    Electronically signed by

## 2018-03-03 NOTE — PLAN OF CARE
Problem: Falls - Risk of  Goal: Absence of falls  Outcome: Ongoing  Pt. Free of falls and injuries this shift. Problem: Pain:  Goal: Pain level will decrease  Pain level will decrease   Outcome: Ongoing  Adequate pain control achieved this shift. See MAR. Problem: Risk for Impaired Skin Integrity  Goal: Tissue integrity - skin and mucous membranes  Structural intactness and normal physiological function of skin and  mucous membranes. Outcome: Ongoing  Skin integrity improved/maintained this shift. See head to toe assessment.

## 2018-03-03 NOTE — PROGRESS NOTES
Progress Note  3/3/2018 11:21 AM  Subjective:   Admit Date: 2/28/2018  PCP: Everton Lockett MD  Interval History: Pt slow to get to commode this morning. Is confused, not oriented to time. Some lethargy, but had ativan last night and norco for pain this morning. Diet: DIET GENERAL;  Medications:   Scheduled Meds:   sodium chloride flush  10 mL Intravenous 2 times per day    nicotine  1 patch Transdermal Daily    orphenadrine  100 mg Oral BID    folic acid  1 mg Oral Daily    citalopram  40 mg Oral Daily    levothyroxine  50 mcg Oral Daily    pantoprazole  40 mg Oral QAM AC    propranolol  10 mg Oral TID    vitamin B-12  1,000 mcg Oral Daily    vitamin D  1,000 Units Oral Daily     Continuous Infusions:  CBC:   Recent Labs      02/28/18   1340  03/01/18   0549   WBC  7.3  6.5   HGB  14.0  13.9   PLT  280  273     BMP:    Recent Labs      02/28/18   1340   NA  134*   K  4.3   CL  95*   CO2  26   BUN  8   CREATININE  0.49*   GLUCOSE  86     Hepatic:   Recent Labs      02/28/18   1340   AST  27   ALT  17   BILITOT  0.56   ALKPHOS  118*     Troponin: No results for input(s): TROPONINI in the last 72 hours. BNP: No results for input(s): BNP in the last 72 hours. Lipids: No results for input(s): CHOL, HDL in the last 72 hours.     Invalid input(s): LDLCALCU  INR:   Recent Labs      02/28/18   1340   INR  1.1       Objective:   Vitals: BP (!) 140/79   Pulse 62   Temp 97.9 °F (36.6 °C) (Oral)   Resp 16   Ht 5' 2\" (1.575 m)   Wt 122 lb 12.7 oz (55.7 kg)   SpO2 95%   BMI 22.46 kg/m²   General appearance: alert and cooperative with exam  Neck: supple, symmetrical, trachea midline  Lungs: clear to auscultation bilaterally  Heart: regular rate and rhythm, S1, S2 normal, no murmur, click, rub or gallop  Abdomen: soft, non-tender; bowel sounds normal; no masses,  no organomegaly  Extremities: extremities normal, atraumatic, no cyanosis or edema  Neurologic: Mental status: Alert, oriented, thought content

## 2018-03-04 PROCEDURE — 6370000000 HC RX 637 (ALT 250 FOR IP): Performed by: SURGERY

## 2018-03-04 PROCEDURE — 6370000000 HC RX 637 (ALT 250 FOR IP): Performed by: FAMILY MEDICINE

## 2018-03-04 PROCEDURE — 97110 THERAPEUTIC EXERCISES: CPT

## 2018-03-04 PROCEDURE — 2580000003 HC RX 258: Performed by: FAMILY MEDICINE

## 2018-03-04 PROCEDURE — 6360000002 HC RX W HCPCS: Performed by: SURGERY

## 2018-03-04 PROCEDURE — 97535 SELF CARE MNGMENT TRAINING: CPT

## 2018-03-04 PROCEDURE — 99232 SBSQ HOSP IP/OBS MODERATE 35: CPT | Performed by: FAMILY MEDICINE

## 2018-03-04 PROCEDURE — 1200000000 HC SEMI PRIVATE

## 2018-03-04 PROCEDURE — 97116 GAIT TRAINING THERAPY: CPT

## 2018-03-04 RX ADMIN — FENTANYL CITRATE 25 MCG: 50 INJECTION INTRAMUSCULAR; INTRAVENOUS at 00:43

## 2018-03-04 RX ADMIN — CYANOCOBALAMIN TAB 1000 MCG 1000 MCG: 1000 TAB at 09:03

## 2018-03-04 RX ADMIN — LEVOTHYROXINE SODIUM 50 MCG: 50 TABLET ORAL at 06:29

## 2018-03-04 RX ADMIN — ACETAMINOPHEN 650 MG: 325 TABLET, FILM COATED ORAL at 00:43

## 2018-03-04 RX ADMIN — PANTOPRAZOLE SODIUM 40 MG: 40 TABLET, DELAYED RELEASE ORAL at 06:29

## 2018-03-04 RX ADMIN — PROPRANOLOL HYDROCHLORIDE 10 MG: 10 TABLET ORAL at 21:31

## 2018-03-04 RX ADMIN — ACETAMINOPHEN 650 MG: 325 TABLET, FILM COATED ORAL at 17:12

## 2018-03-04 RX ADMIN — ORPHENADRINE CITRATE 100 MG: 100 TABLET, EXTENDED RELEASE ORAL at 21:32

## 2018-03-04 RX ADMIN — VITAMIN D, TAB 1000IU (100/BT) 1000 UNITS: 25 TAB at 09:03

## 2018-03-04 RX ADMIN — PROPRANOLOL HYDROCHLORIDE 10 MG: 10 TABLET ORAL at 09:03

## 2018-03-04 RX ADMIN — ORPHENADRINE CITRATE 100 MG: 100 TABLET, EXTENDED RELEASE ORAL at 09:04

## 2018-03-04 RX ADMIN — CITALOPRAM HYDROBROMIDE 40 MG: 40 TABLET ORAL at 09:03

## 2018-03-04 RX ADMIN — Medication 10 ML: at 21:34

## 2018-03-04 RX ADMIN — Medication 10 ML: at 09:21

## 2018-03-04 RX ADMIN — ALBUTEROL SULFATE 2 PUFF: 90 AEROSOL, METERED RESPIRATORY (INHALATION) at 11:56

## 2018-03-04 RX ADMIN — ACETAMINOPHEN 650 MG: 325 TABLET, FILM COATED ORAL at 11:56

## 2018-03-04 RX ADMIN — FOLIC ACID 1 MG: 1 TABLET ORAL at 09:03

## 2018-03-04 ASSESSMENT — PAIN DESCRIPTION - PROGRESSION
CLINICAL_PROGRESSION: GRADUALLY IMPROVING
CLINICAL_PROGRESSION: NOT CHANGED
CLINICAL_PROGRESSION: GRADUALLY IMPROVING
CLINICAL_PROGRESSION: GRADUALLY WORSENING
CLINICAL_PROGRESSION: GRADUALLY IMPROVING
CLINICAL_PROGRESSION: GRADUALLY WORSENING
CLINICAL_PROGRESSION: GRADUALLY IMPROVING
CLINICAL_PROGRESSION: GRADUALLY IMPROVING

## 2018-03-04 ASSESSMENT — PAIN SCALES - GENERAL
PAINLEVEL_OUTOF10: 8
PAINLEVEL_OUTOF10: 8
PAINLEVEL_OUTOF10: 6
PAINLEVEL_OUTOF10: 8
PAINLEVEL_OUTOF10: 0
PAINLEVEL_OUTOF10: 5
PAINLEVEL_OUTOF10: 6
PAINLEVEL_OUTOF10: 8
PAINLEVEL_OUTOF10: 5

## 2018-03-04 ASSESSMENT — PAIN DESCRIPTION - LOCATION
LOCATION: RIB CAGE

## 2018-03-04 ASSESSMENT — PAIN DESCRIPTION - DESCRIPTORS
DESCRIPTORS: SHARP

## 2018-03-04 ASSESSMENT — PAIN DESCRIPTION - ONSET
ONSET: ON-GOING

## 2018-03-04 ASSESSMENT — PAIN DESCRIPTION - ORIENTATION
ORIENTATION: LEFT

## 2018-03-04 ASSESSMENT — PAIN DESCRIPTION - FREQUENCY
FREQUENCY: CONTINUOUS

## 2018-03-04 NOTE — PROGRESS NOTES
REPAIR      HYSTERECTOMY      KYPHOSIS SURGERY  10/27/2016    kyphoplasty L1 and L5    PA COLSC FLX W/RMVL OF TUMOR POLYP LESION SNARE TQ N/A 4/10/2017    COLONOSCOPY POLYPECTOMY SNARE x2 with photos performed by Suhas Vila MD at [facility] EGD TRANSORAL BIOPSY SINGLE/MULTIPLE N/A 4/10/2017    EGD BIOPSY x2 with photos performed by Suhas Vila MD at [facility]  04/10/2017    schatzki's ring; chronic peptic duodenitis       Restrictions  Restrictions/Precautions  Restrictions/Precautions: Fall Risk, General Precautions  Required Braces or Orthoses?: Yes (Has been wearing sleeve on R knee)  Required Braces or Orthoses  Right Lower Extremity Brace:  (sleeve right knee)  Position Activity Restriction  Other position/activity restrictions: 1:1 in room for safety  Subjective   General  Response To Previous Treatment: Patient with no complaints from previous session. Family / Caregiver Present: No  Pain Screening  Patient Currently in Pain: Yes  Pain Assessment  Pain Assessment: 0-10  Pain Level: 8  Pain Location: Rib cage  Pain Orientation: Left  Vital Signs  Patient Currently in Pain: Yes       Orientation  Orientation  Overall Orientation Status: Impaired (pt seeing centipedes in room; nursing notified.)  Orientation Level: Oriented to person;Oriented to place;Oriented to time  Objective   Bed mobility  Scooting: Independent  Transfers  Sit to Stand: Contact guard assistance  Stand to sit: Contact guard assistance  Stand Pivot Transfers: Minimal Assistance  Ambulation  Ambulation?: Yes  Ambulation 1  Surface: level tile  Device: Rolling Walker  Assistance: Minimal assistance  Quality of Gait: Pt with decreased step length and height with shuffling of feet at times; decreased velocity; tends to push walker too anterior to PATTY with legs lagging behind; decreased safety awareness.   Distance: [de-identified]'  Stairs/Curb  Stairs?: No

## 2018-03-04 NOTE — PROGRESS NOTES
bed, Call light within reach, Bed alarm in place  Plan  Times per week: 5-7  Times per day: Daily  Current Treatment Recommendations: Balance Training, Functional Mobility Training, Endurance Training, Cognitive Reorientation, Pain Management, Safety Education & Training, Patient/Caregiver Education & Training, Equipment Evaluation, Education, & procurement, Self-Care / ADL    Goals  Short term goals  Time Frame for Short term goals: By Discharge  Short term goal 1: Pt will actively participate in self-care routines and complete tasks with SBA and Good safety. Short term goal 2: Pt will complete toilet transfer and toileting with SBA and Good safety. Short term goal 3: Pt will V/D good understanding of fall prevention strategies to utilize at home. Short term goal 4: Pt will actively participate in 15-20 minutes of therapeutic exercise/activity to promote increased independence and safety with self-care and mobility.     OT Individual Minutes  Time In: 9772  Time Out: 3625  Minutes: 27    Electronically signed by Kimberlee Fenton on 3/4/18 at 10:55 AM

## 2018-03-04 NOTE — PROGRESS NOTES
fractures. Waiting on pre cert from insurance to get pt to skilled care at [facility]. Continue lidoderm patch and tylenol. 2. Alcoholism- pt was not drinking much of the wine and had hallucinations 2 days ago. Bot some ativan, but now only got one dose of klonopin last night to help her rest.  Wine and ativan d/c'd.        Patient Active Problem List:     Anorexia     Arthritis     Hypothyroidism     Memory loss     Menopause     Muscle weakness     Tremor     Other specified hypothyroidism     Chronic pain     Compressed spine fracture (HCC)     Closed fracture of second lumbar vertebra (HCC)     Closed fracture of ninth thoracic vertebra (HCC)     Menopausal osteoporosis     Alcoholism ([facility] [address]     Compression fx, lumbar spine (HCC)     Osteoporosis     Dysthymia     Medication management     Diarrhea     Dehydration     Hyponatremia     Hypokalemia     Electrolyte imbalance     Sessile rectal polyp     Tubular adenoma of colon     Schatzki's ring     Peptic ulcer of duodenum     History of Helicobacter pylori infection     Chronic obstructive pulmonary disease (HCC)     Closed fracture of multiple ribs of left side with routine healing     Fall      Electronically signed by Rita Valverde MD on 3/4/2018 at 9:57 AM

## 2018-03-05 VITALS
HEIGHT: 62 IN | TEMPERATURE: 97.5 F | HEART RATE: 73 BPM | WEIGHT: 122.8 LBS | SYSTOLIC BLOOD PRESSURE: 138 MMHG | RESPIRATION RATE: 16 BRPM | BODY MASS INDEX: 22.6 KG/M2 | OXYGEN SATURATION: 96 % | DIASTOLIC BLOOD PRESSURE: 92 MMHG

## 2018-03-05 PROCEDURE — 6370000000 HC RX 637 (ALT 250 FOR IP): Performed by: SURGERY

## 2018-03-05 PROCEDURE — 6370000000 HC RX 637 (ALT 250 FOR IP): Performed by: FAMILY MEDICINE

## 2018-03-05 PROCEDURE — 6360000002 HC RX W HCPCS: Performed by: SURGERY

## 2018-03-05 PROCEDURE — 1200000000 HC SEMI PRIVATE

## 2018-03-05 PROCEDURE — 97116 GAIT TRAINING THERAPY: CPT

## 2018-03-05 PROCEDURE — 2580000003 HC RX 258: Performed by: FAMILY MEDICINE

## 2018-03-05 PROCEDURE — 99232 SBSQ HOSP IP/OBS MODERATE 35: CPT | Performed by: FAMILY MEDICINE

## 2018-03-05 PROCEDURE — 97110 THERAPEUTIC EXERCISES: CPT

## 2018-03-05 RX ORDER — ACETAMINOPHEN 325 MG/1
650 TABLET ORAL EVERY 4 HOURS PRN
Qty: 120 TABLET | Refills: 3
Start: 2018-03-05 | End: 2019-07-19

## 2018-03-05 RX ADMIN — FENTANYL CITRATE 25 MCG: 50 INJECTION INTRAMUSCULAR; INTRAVENOUS at 00:00

## 2018-03-05 RX ADMIN — VITAMIN D, TAB 1000IU (100/BT) 1000 UNITS: 25 TAB at 10:26

## 2018-03-05 RX ADMIN — PROPRANOLOL HYDROCHLORIDE 10 MG: 10 TABLET ORAL at 14:42

## 2018-03-05 RX ADMIN — ALBUTEROL SULFATE 2 PUFF: 90 AEROSOL, METERED RESPIRATORY (INHALATION) at 03:17

## 2018-03-05 RX ADMIN — PROPRANOLOL HYDROCHLORIDE 10 MG: 10 TABLET ORAL at 10:27

## 2018-03-05 RX ADMIN — FENTANYL CITRATE 25 MCG: 50 INJECTION INTRAMUSCULAR; INTRAVENOUS at 05:29

## 2018-03-05 RX ADMIN — FOLIC ACID 1 MG: 1 TABLET ORAL at 10:26

## 2018-03-05 RX ADMIN — PANTOPRAZOLE SODIUM 40 MG: 40 TABLET, DELAYED RELEASE ORAL at 05:29

## 2018-03-05 RX ADMIN — LEVOTHYROXINE SODIUM 50 MCG: 50 TABLET ORAL at 05:29

## 2018-03-05 RX ADMIN — CLONAZEPAM 0.5 MG: 0.5 TABLET ORAL at 03:48

## 2018-03-05 RX ADMIN — Medication 10 ML: at 10:28

## 2018-03-05 RX ADMIN — ORPHENADRINE CITRATE 100 MG: 100 TABLET, EXTENDED RELEASE ORAL at 10:28

## 2018-03-05 RX ADMIN — CYANOCOBALAMIN TAB 1000 MCG 1000 MCG: 1000 TAB at 10:26

## 2018-03-05 RX ADMIN — CITALOPRAM HYDROBROMIDE 40 MG: 40 TABLET ORAL at 10:27

## 2018-03-05 ASSESSMENT — PAIN DESCRIPTION - DESCRIPTORS: DESCRIPTORS: CONSTANT;JABBING

## 2018-03-05 ASSESSMENT — PAIN SCALES - GENERAL
PAINLEVEL_OUTOF10: 0
PAINLEVEL_OUTOF10: 3
PAINLEVEL_OUTOF10: 9
PAINLEVEL_OUTOF10: 0
PAINLEVEL_OUTOF10: 4
PAINLEVEL_OUTOF10: 8

## 2018-03-05 ASSESSMENT — PAIN DESCRIPTION - ORIENTATION: ORIENTATION: LEFT;LOWER

## 2018-03-05 ASSESSMENT — PAIN DESCRIPTION - PAIN TYPE: TYPE: ACUTE PAIN

## 2018-03-05 ASSESSMENT — PAIN DESCRIPTION - LOCATION: LOCATION: BACK

## 2018-03-05 NOTE — PROGRESS NOTES
History of Helicobacter pylori infection     Chronic obstructive pulmonary disease ([facility] [address]     Closed fracture of multiple ribs of left side with routine healing     Fall      Electronically signed by Yani Padgett MD on 3/5/2018 at 7:34 AM

## 2018-03-05 NOTE — PROGRESS NOTES
Reviewed lab results and CT Head and CT spine explaining that they were negative Discussed DT's Daughter verbalized understanding

## 2018-03-05 NOTE — CARE COORDINATION
Social work: spoke with daughter to advise that on site completed. Pt accepted at [address] [facility]. Daughter expressed concerns as her step father, pt's  is starting to show signs of mild dementia and both may need long term assisted living at the same complex. Snf is aware of that plan also. Will need jacqueline, Rx if any controlled meds ordered and discharge med list along with hens at discharge. Mom left message [facility] for her and she is agreeable with [facility] of [place].    James sweet

## 2018-03-05 NOTE — CARE COORDINATION
Social work: Chase Ocasio was here for on site. Pt agreeable to go to snf. Await precert or final ok from admin at [address] [place] [address]. Need jacqueline, Rx and discharge med list at discharge.   Jordyn sweet

## 2018-03-05 NOTE — PROGRESS NOTES
[address]   OCCUPATIONAL THERAPY MISSED TREATMENT NOTE   INPATIENT   Date: 3/5/18  Patient Name: Esa Bone       Room:   MRN: [id number]   Account #: [de-identified]    : 1945  (73 y.o.)  Gender: female   Referring Practitioner: Dr. Ruth Dyer  Diagnosis: Fall with multiple L side rib fractures             REASON FOR MISSED TREATMENT:  Patient refusal   -   Other - Pt demo confusion, I.e pt doesn't know where she is, pt states that she doesn't want to participate, Mi Lockett RN aware       NAVJOT Donaldson
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
## 2018-04-26 ENCOUNTER — OFFICE VISIT (OUTPATIENT)
Dept: GASTROENTEROLOGY | Age: 73
End: 2018-04-26
Payer: COMMERCIAL

## 2018-04-26 VITALS
BODY MASS INDEX: 20.72 KG/M2 | HEART RATE: 80 BPM | SYSTOLIC BLOOD PRESSURE: 137 MMHG | DIASTOLIC BLOOD PRESSURE: 89 MMHG | WEIGHT: 113.3 LBS

## 2018-04-26 DIAGNOSIS — R63.4 WEIGHT LOSS: ICD-10-CM

## 2018-04-26 DIAGNOSIS — D12.6 TUBULAR ADENOMA OF COLON: ICD-10-CM

## 2018-04-26 DIAGNOSIS — R19.7 DIARRHEA, UNSPECIFIED TYPE: Primary | ICD-10-CM

## 2018-04-26 DIAGNOSIS — K62.1 SESSILE RECTAL POLYP: ICD-10-CM

## 2018-04-26 PROCEDURE — 99214 OFFICE O/P EST MOD 30 MIN: CPT | Performed by: INTERNAL MEDICINE

## 2018-04-26 ASSESSMENT — ENCOUNTER SYMPTOMS
ABDOMINAL DISTENTION: 0
ALLERGIC/IMMUNOLOGIC NEGATIVE: 1
RECTAL PAIN: 0
ABDOMINAL PAIN: 0
NAUSEA: 0
DIARRHEA: 1
COUGH: 1
BLOOD IN STOOL: 0
VOMITING: 0
TROUBLE SWALLOWING: 0
BACK PAIN: 1
EYES NEGATIVE: 1
CONSTIPATION: 0
ANAL BLEEDING: 0

## 2018-09-26 PROBLEM — E86.0 DEHYDRATION: Status: RESOLVED | Noted: 2017-04-07 | Resolved: 2018-09-26

## 2018-12-19 DIAGNOSIS — E03.9 HYPOTHYROIDISM, UNSPECIFIED TYPE: ICD-10-CM

## 2018-12-19 RX ORDER — LEVOTHYROXINE SODIUM 0.05 MG/1
TABLET ORAL
Qty: 90 TABLET | Refills: 1 | Status: SHIPPED | OUTPATIENT
Start: 2018-12-19 | End: 2019-06-28 | Stop reason: SDUPTHER

## 2019-01-08 ENCOUNTER — OFFICE VISIT (OUTPATIENT)
Dept: PRIMARY CARE CLINIC | Age: 74
End: 2019-01-08
Payer: COMMERCIAL

## 2019-01-08 VITALS
SYSTOLIC BLOOD PRESSURE: 134 MMHG | HEART RATE: 68 BPM | BODY MASS INDEX: 23.56 KG/M2 | DIASTOLIC BLOOD PRESSURE: 88 MMHG | HEIGHT: 61 IN | WEIGHT: 124.8 LBS

## 2019-01-08 DIAGNOSIS — J40 BRONCHITIS: ICD-10-CM

## 2019-01-08 DIAGNOSIS — Z12.31 ENCOUNTER FOR SCREENING MAMMOGRAM FOR BREAST CANCER: ICD-10-CM

## 2019-01-08 DIAGNOSIS — M81.0 MENOPAUSAL OSTEOPOROSIS: Chronic | ICD-10-CM

## 2019-01-08 DIAGNOSIS — G89.29 CHRONIC BILATERAL LOW BACK PAIN WITHOUT SCIATICA: ICD-10-CM

## 2019-01-08 DIAGNOSIS — E03.9 HYPOTHYROIDISM, UNSPECIFIED TYPE: ICD-10-CM

## 2019-01-08 DIAGNOSIS — M48.50XA COMPRESSED SPINE FRACTURE (HCC): ICD-10-CM

## 2019-01-08 DIAGNOSIS — J44.9 CHRONIC OBSTRUCTIVE PULMONARY DISEASE, UNSPECIFIED COPD TYPE (HCC): Primary | ICD-10-CM

## 2019-01-08 DIAGNOSIS — M54.50 CHRONIC BILATERAL LOW BACK PAIN WITHOUT SCIATICA: ICD-10-CM

## 2019-01-08 DIAGNOSIS — F10.20 ALCOHOLISM (HCC): Chronic | ICD-10-CM

## 2019-01-08 PROCEDURE — 99214 OFFICE O/P EST MOD 30 MIN: CPT | Performed by: FAMILY MEDICINE

## 2019-01-08 RX ORDER — CIPROFLOXACIN 500 MG/1
500 TABLET, FILM COATED ORAL 2 TIMES DAILY
Qty: 14 TABLET | Refills: 0 | Status: SHIPPED | OUTPATIENT
Start: 2019-01-08 | End: 2019-01-15

## 2019-01-08 ASSESSMENT — ENCOUNTER SYMPTOMS
BACK PAIN: 1
RHINORRHEA: 1
SHORTNESS OF BREATH: 1
COUGH: 1

## 2019-01-08 ASSESSMENT — COPD QUESTIONNAIRES: COPD: 1

## 2019-01-15 DIAGNOSIS — G89.29 OTHER CHRONIC PAIN: ICD-10-CM

## 2019-01-15 RX ORDER — GABAPENTIN 100 MG/1
CAPSULE ORAL
Qty: 180 CAPSULE | Refills: 2 | Status: SHIPPED | OUTPATIENT
Start: 2019-01-15 | End: 2019-04-05 | Stop reason: SDUPTHER

## 2019-01-21 ENCOUNTER — HOSPITAL ENCOUNTER (OUTPATIENT)
Dept: WOMENS IMAGING | Age: 74
Discharge: HOME | End: 2019-01-23
Payer: COMMERCIAL

## 2019-01-21 DIAGNOSIS — Z12.31 ENCOUNTER FOR SCREENING MAMMOGRAM FOR BREAST CANCER: ICD-10-CM

## 2019-01-21 PROCEDURE — 77063 BREAST TOMOSYNTHESIS BI: CPT

## 2019-01-22 RX ORDER — PANTOPRAZOLE SODIUM 40 MG/1
TABLET, DELAYED RELEASE ORAL
Qty: 30 TABLET | Refills: 1 | Status: SHIPPED | OUTPATIENT
Start: 2019-01-22 | End: 2019-03-15 | Stop reason: SDUPTHER

## 2019-03-11 RX ORDER — ALBUTEROL SULFATE 90 UG/1
1-2 AEROSOL, METERED RESPIRATORY (INHALATION) EVERY 4 HOURS PRN
Qty: 1 INHALER | Refills: 2 | Status: SHIPPED | OUTPATIENT
Start: 2019-03-11 | End: 2019-06-26 | Stop reason: SDUPTHER

## 2019-03-15 ENCOUNTER — HOSPITAL ENCOUNTER (OUTPATIENT)
Age: 74
Setting detail: SPECIMEN
Discharge: HOME | End: 2019-03-15
Payer: COMMERCIAL

## 2019-03-15 ENCOUNTER — OFFICE VISIT (OUTPATIENT)
Dept: PRIMARY CARE CLINIC | Age: 74
End: 2019-03-15
Payer: COMMERCIAL

## 2019-03-15 VITALS
OXYGEN SATURATION: 97 % | HEART RATE: 68 BPM | DIASTOLIC BLOOD PRESSURE: 86 MMHG | BODY MASS INDEX: 26.53 KG/M2 | WEIGHT: 140.4 LBS | SYSTOLIC BLOOD PRESSURE: 130 MMHG

## 2019-03-15 DIAGNOSIS — R63.5 WEIGHT GAIN: ICD-10-CM

## 2019-03-15 DIAGNOSIS — R06.03 ACUTE RESPIRATORY DISTRESS: Primary | ICD-10-CM

## 2019-03-15 DIAGNOSIS — R06.03 ACUTE RESPIRATORY DISTRESS: ICD-10-CM

## 2019-03-15 DIAGNOSIS — J40 BRONCHITIS: ICD-10-CM

## 2019-03-15 DIAGNOSIS — R06.02 SHORTNESS OF BREATH: ICD-10-CM

## 2019-03-15 LAB
ANION GAP SERPL CALCULATED.3IONS-SCNC: 14 MMOL/L (ref 9–17)
BNP INTERPRETATION: ABNORMAL
BUN BLDV-MCNC: 13 MG/DL (ref 8–23)
BUN/CREAT BLD: ABNORMAL (ref 9–20)
CALCIUM SERPL-MCNC: 9.3 MG/DL (ref 8.6–10.4)
CHLORIDE BLD-SCNC: 89 MMOL/L (ref 98–107)
CO2: 28 MMOL/L (ref 20–31)
CREAT SERPL-MCNC: 0.63 MG/DL (ref 0.5–0.9)
GFR AFRICAN AMERICAN: >60 ML/MIN
GFR NON-AFRICAN AMERICAN: >60 ML/MIN
GFR SERPL CREATININE-BSD FRML MDRD: ABNORMAL ML/MIN/{1.73_M2}
GFR SERPL CREATININE-BSD FRML MDRD: ABNORMAL ML/MIN/{1.73_M2}
GLUCOSE BLD-MCNC: 91 MG/DL (ref 70–99)
HCT VFR BLD CALC: 39.1 % (ref 36.3–47.1)
HEMOGLOBIN: 13.2 G/DL (ref 11.9–15.1)
MCH RBC QN AUTO: 32.8 PG (ref 25.2–33.5)
MCHC RBC AUTO-ENTMCNC: 33.8 G/DL (ref 28.4–34.8)
MCV RBC AUTO: 97 FL (ref 82.6–102.9)
NRBC AUTOMATED: 0 PER 100 WBC
PDW BLD-RTO: 13.8 % (ref 11.8–14.4)
PLATELET # BLD: 309 K/UL (ref 138–453)
PMV BLD AUTO: 9.3 FL (ref 8.1–13.5)
POTASSIUM SERPL-SCNC: 4.9 MMOL/L (ref 3.7–5.3)
PRO-BNP: 534 PG/ML
RBC # BLD: 4.03 M/UL (ref 3.95–5.11)
SODIUM BLD-SCNC: 131 MMOL/L (ref 135–144)
WBC # BLD: 7.5 K/UL (ref 3.5–11.3)

## 2019-03-15 PROCEDURE — 99213 OFFICE O/P EST LOW 20 MIN: CPT | Performed by: FAMILY MEDICINE

## 2019-03-15 RX ORDER — DOXYCYCLINE HYCLATE 100 MG
100 TABLET ORAL 2 TIMES DAILY
Qty: 14 TABLET | Refills: 0 | Status: SHIPPED | OUTPATIENT
Start: 2019-03-15 | End: 2019-03-22

## 2019-03-15 RX ORDER — FLUTICASONE FUROATE AND VILANTEROL 200; 25 UG/1; UG/1
POWDER RESPIRATORY (INHALATION)
COMMUNITY
End: 2019-03-15 | Stop reason: SDUPTHER

## 2019-03-15 RX ORDER — FLUTICASONE FUROATE AND VILANTEROL 200; 25 UG/1; UG/1
1 POWDER RESPIRATORY (INHALATION) DAILY
Qty: 28 EACH | Refills: 5 | Status: SHIPPED | OUTPATIENT
Start: 2019-03-15 | End: 2019-09-09 | Stop reason: SDUPTHER

## 2019-03-15 RX ORDER — PREDNISONE 20 MG/1
20 TABLET ORAL 2 TIMES DAILY
Qty: 10 TABLET | Refills: 0 | Status: SHIPPED | OUTPATIENT
Start: 2019-03-15 | End: 2019-03-20

## 2019-03-15 RX ORDER — PANTOPRAZOLE SODIUM 40 MG/1
40 TABLET, DELAYED RELEASE ORAL DAILY
Qty: 30 TABLET | Refills: 3 | Status: SHIPPED | OUTPATIENT
Start: 2019-03-15 | End: 2019-09-09

## 2019-03-15 RX ORDER — CITALOPRAM 40 MG/1
TABLET ORAL
Qty: 30 TABLET | Refills: 5 | Status: SHIPPED | OUTPATIENT
Start: 2019-03-15 | End: 2019-09-09 | Stop reason: SDUPTHER

## 2019-03-15 ASSESSMENT — ENCOUNTER SYMPTOMS: COUGH: 1

## 2019-03-18 ENCOUNTER — HOSPITAL ENCOUNTER (OUTPATIENT)
Dept: GENERAL RADIOLOGY | Age: 74
Discharge: HOME | End: 2019-03-20
Payer: COMMERCIAL

## 2019-03-18 DIAGNOSIS — R06.02 SHORTNESS OF BREATH: ICD-10-CM

## 2019-03-18 DIAGNOSIS — J40 BRONCHITIS: ICD-10-CM

## 2019-03-18 DIAGNOSIS — R06.03 ACUTE RESPIRATORY DISTRESS: ICD-10-CM

## 2019-03-18 PROCEDURE — 71046 X-RAY EXAM CHEST 2 VIEWS: CPT

## 2019-04-05 ENCOUNTER — HOSPITAL ENCOUNTER (OUTPATIENT)
Age: 74
Discharge: HOME | End: 2019-04-05
Payer: COMMERCIAL

## 2019-04-05 ENCOUNTER — HOSPITAL ENCOUNTER (OUTPATIENT)
Dept: GENERAL RADIOLOGY | Age: 74
Discharge: HOME | End: 2019-04-07
Payer: COMMERCIAL

## 2019-04-05 ENCOUNTER — HOSPITAL ENCOUNTER (OUTPATIENT)
Age: 74
Discharge: HOME | End: 2019-04-07
Payer: COMMERCIAL

## 2019-04-05 ENCOUNTER — OFFICE VISIT (OUTPATIENT)
Dept: PRIMARY CARE CLINIC | Age: 74
End: 2019-04-05
Payer: COMMERCIAL

## 2019-04-05 VITALS
HEART RATE: 72 BPM | WEIGHT: 145.6 LBS | DIASTOLIC BLOOD PRESSURE: 92 MMHG | SYSTOLIC BLOOD PRESSURE: 128 MMHG | OXYGEN SATURATION: 94 % | BODY MASS INDEX: 27.51 KG/M2

## 2019-04-05 DIAGNOSIS — R10.84 GENERALIZED ABDOMINAL PAIN: ICD-10-CM

## 2019-04-05 DIAGNOSIS — G89.29 OTHER CHRONIC PAIN: ICD-10-CM

## 2019-04-05 DIAGNOSIS — R53.83 FATIGUE, UNSPECIFIED TYPE: ICD-10-CM

## 2019-04-05 DIAGNOSIS — R10.84 GENERALIZED ABDOMINAL PAIN: Primary | ICD-10-CM

## 2019-04-05 DIAGNOSIS — F10.20 ALCOHOLISM (HCC): ICD-10-CM

## 2019-04-05 DIAGNOSIS — J44.9 CHRONIC OBSTRUCTIVE PULMONARY DISEASE, UNSPECIFIED COPD TYPE (HCC): ICD-10-CM

## 2019-04-05 LAB
BILIRUBIN, POC: ABNORMAL
BLOOD URINE, POC: ABNORMAL
CLARITY, POC: ABNORMAL
COLOR, POC: ABNORMAL
CREAT SERPL-MCNC: 0.61 MG/DL (ref 0.5–0.9)
GFR AFRICAN AMERICAN: >60 ML/MIN
GFR NON-AFRICAN AMERICAN: >60 ML/MIN
GFR SERPL CREATININE-BSD FRML MDRD: NORMAL ML/MIN/{1.73_M2}
GFR SERPL CREATININE-BSD FRML MDRD: NORMAL ML/MIN/{1.73_M2}
GLUCOSE URINE, POC: ABNORMAL
HCT VFR BLD CALC: 42.5 % (ref 36–46)
HEMOGLOBIN: 14.1 G/DL (ref 12–16)
KETONES, POC: ABNORMAL
LEUKOCYTE EST, POC: ABNORMAL
MCH RBC QN AUTO: 32.7 PG (ref 26–34)
MCHC RBC AUTO-ENTMCNC: 33.2 G/DL (ref 31–37)
MCV RBC AUTO: 98.7 FL (ref 80–100)
NITRITE, POC: ABNORMAL
NRBC AUTOMATED: NORMAL PER 100 WBC
PDW BLD-RTO: 14.3 % (ref 11.5–14.9)
PH, POC: 7
PLATELET # BLD: 302 K/UL (ref 150–450)
PMV BLD AUTO: 6.2 FL (ref 6–12)
PROTEIN, POC: ABNORMAL
RBC # BLD: 4.3 M/UL (ref 4–5.2)
SPECIFIC GRAVITY, POC: 1.01
UROBILINOGEN, POC: 0.2
WBC # BLD: 6.9 K/UL (ref 3.5–11)

## 2019-04-05 PROCEDURE — 81003 URINALYSIS AUTO W/O SCOPE: CPT | Performed by: FAMILY MEDICINE

## 2019-04-05 PROCEDURE — 85027 COMPLETE CBC AUTOMATED: CPT

## 2019-04-05 PROCEDURE — 82565 ASSAY OF CREATININE: CPT

## 2019-04-05 PROCEDURE — 74018 RADEX ABDOMEN 1 VIEW: CPT

## 2019-04-05 PROCEDURE — 36415 COLL VENOUS BLD VENIPUNCTURE: CPT

## 2019-04-05 PROCEDURE — 99214 OFFICE O/P EST MOD 30 MIN: CPT | Performed by: FAMILY MEDICINE

## 2019-04-05 RX ORDER — METRONIDAZOLE 500 MG/1
500 TABLET ORAL 3 TIMES DAILY
Qty: 30 TABLET | Refills: 0 | Status: SHIPPED | OUTPATIENT
Start: 2019-04-05 | End: 2019-04-15

## 2019-04-05 RX ORDER — GABAPENTIN 100 MG/1
300 CAPSULE ORAL NIGHTLY
Qty: 90 CAPSULE | Refills: 0 | Status: SHIPPED | OUTPATIENT
Start: 2019-04-05 | End: 2019-04-25

## 2019-04-05 NOTE — PATIENT INSTRUCTIONS
Patient Education        Abdominal Pain: Care Instructions  Your Care Instructions    Abdominal pain has many possible causes. Some aren't serious and get better on their own in a few days. Others need more testing and treatment. If your pain continues or gets worse, you need to be rechecked and may need more tests to find out what is wrong. You may need surgery to correct the problem. Don't ignore new symptoms, such as fever, nausea and vomiting, urination problems, pain that gets worse, and dizziness. These may be signs of a more serious problem. Your doctor may have recommended a follow-up visit in the next 8 to 12 hours. If you are not getting better, you may need more tests or treatment. The doctor has checked you carefully, but problems can develop later. If you notice any problems or new symptoms, get medical treatment right away. Follow-up care is a key part of your treatment and safety. Be sure to make and go to all appointments, and call your doctor if you are having problems. It's also a good idea to know your test results and keep a list of the medicines you take. How can you care for yourself at home? · Rest until you feel better. · To prevent dehydration, drink plenty of fluids, enough so that your urine is light yellow or clear like water. Choose water and other caffeine-free clear liquids until you feel better. If you have kidney, heart, or liver disease and have to limit fluids, talk with your doctor before you increase the amount of fluids you drink. · If your stomach is upset, eat mild foods, such as rice, dry toast or crackers, bananas, and applesauce. Try eating several small meals instead of two or three large ones. · Wait until 48 hours after all symptoms have gone away before you have spicy foods, alcohol, and drinks that contain caffeine. · Do not eat foods that are high in fat. · Avoid anti-inflammatory medicines such as aspirin, ibuprofen (Advil, Motrin), and naproxen (Aleve). These can cause stomach upset. Talk to your doctor if you take daily aspirin for another health problem. When should you call for help? Call 911 anytime you think you may need emergency care. For example, call if:    · You passed out (lost consciousness).     · You pass maroon or very bloody stools.     · You vomit blood or what looks like coffee grounds.     · You have new, severe belly pain.    Call your doctor now or seek immediate medical care if:    · Your pain gets worse, especially if it becomes focused in one area of your belly.     · You have a new or higher fever.     · Your stools are black and look like tar, or they have streaks of blood.     · You have unexpected vaginal bleeding.     · You have symptoms of a urinary tract infection. These may include:  ? Pain when you urinate. ? Urinating more often than usual.  ? Blood in your urine.     · You are dizzy or lightheaded, or you feel like you may faint.    Watch closely for changes in your health, and be sure to contact your doctor if:    · You are not getting better after 1 day (24 hours). Where can you learn more? Go to https://Lighthouse [email or web address].[email or web address].com. org and sign in to your Brndstr account. Enter T249 in the Appconomy box to learn more about \"Abdominal Pain: Care Instructions. \"     If you do not have an account, please click on the \"Sign Up Now\" link. Current as of: September 23, 2018  Content Version: 11.9  © 9352-8617 [employer]threads. Care instructions adapted under license by [facility] ([facility]). If you have questions about a medical condition or this instruction, always ask your healthcare professional. [place] [address] any warranty or liability for your use of this information. Patient Education        Diverticulitis: Care Instructions  Your Care Instructions    Diverticulitis occurs when pouches form in the wall of the colon and become inflamed or infected. It can be very painful.   Doctors aren't sure what causes diverticulitis. There is no proof that foods such as nuts, seeds, or berries cause it or make it worse. A low-fiber diet may cause the colon to work harder to push stool forward. Pouches may form because of this extra work. It may be hard to think about healthy eating while you're in pain. But as you recover, you might think about how you can use healthy eating for overall better health. Healthy eating may help you avoid future attacks. Follow-up care is a key part of your treatment and safety. Be sure to make and go to all appointments, and call your doctor if you are having problems. It's also a good idea to know your test results and keep a list of the medicines you take. How can you care for yourself at home? · Drink plenty of fluids, enough so that your urine is light yellow or clear like water. If you have kidney, heart, or liver disease and have to limit fluids, talk with your doctor before you increase the amount of fluids you drink. · Stick to liquids or a bland diet (plain rice, bananas, dry toast or crackers, applesauce) until you are feeling better. Then you can return to regular foods and gradually increase the amount of fiber in your diet. · Use a heating pad set on low on your belly to relieve mild cramps and pain. · Get extra rest until you are feeling better. · Be safe with medicines. Read and follow all instructions on the label. ? If the doctor gave you a prescription medicine for pain, take it as prescribed. ? If you are not taking a prescription pain medicine, ask your doctor if you can take an over-the-counter medicine. · If your doctor prescribed antibiotics, take them as directed. Do not stop taking them just because you feel better. You need to take the full course of antibiotics. To prevent future attacks of diverticulitis  · Avoid constipation:  ? Include fruits, vegetables, beans, and whole grains in your diet each day.  These foods are high in fiber.  ? Drink plenty of fluids, enough so that your urine is light yellow or clear like water. If you have kidney, heart, or liver disease and have to limit fluids, talk with your doctor before you increase the amount of fluids you drink. ? Get some exercise every day. Build up slowly to 30 to 60 minutes a day on 5 or more days of the week. ? Take a fiber supplement, such as Citrucel or Metamucil, every day if needed. Read and follow all instructions on the label. ? Schedule time each day for a bowel movement. Having a daily routine may help. Take your time and do not strain when having a bowel movement. When should you call for help? Call your doctor now or seek immediate medical care if:    · You have a fever.     · You are vomiting.     · You have new or worse belly pain.     · You cannot pass stools or gas.    Watch closely for changes in your health, and be sure to contact your doctor if you have any problems. Where can you learn more? Go to https://Aicent.[employer]. org and sign in to your Lectorati account. Enter H901 in the Drive.SG box to learn more about \"Diverticulitis: Care Instructions. \"     If you do not have an account, please click on the \"Sign Up Now\" link. Current as of: March 27, 2018  Content Version: 11.9  © 2316-2316 i-nexus. Care instructions adapted under license by [facility] ([facility]). If you have questions about a medical condition or this instruction, always ask your healthcare professional. [place] [address] any warranty or liability for your use of this information. Patient Education        Soft-Textured, Ye Postin Diet: Care Instructions  Your Care Instructions    A soft-textured, bland diet is used when you need food that is easy to chew, swallow, and digest. You will need to choose soft foods that are low in spices and seasonings. You will need to avoid high-fat foods, as well as caffeine and alcohol.   Your doctor or the Search Health Information box to learn more about \"Soft-Textured, Marlinda [place] Diet: Care Instructions. \"     If you do not have an account, please click on the \"Sign Up Now\" link. Current as of: March 28, 2018  Content Version: 11.9  © 5413-7056 [employer]. Care instructions adapted under license by [facility] ([facility]). If you have questions about a medical condition or this instruction, always ask your healthcare professional. [address] any warranty or liability for your use of this information.

## 2019-04-05 NOTE — PROGRESS NOTES
Tyron Martell a 68 y.o. female who presents today for her medical conditions/complaints as notedbelow. Chief Complaint   Patient presents with    Follow-up     wheezing, SOB    Fatigue     Excessive sleeping    Pain     lower abdominal pain          HPI:   HPI  Breathing is feeling better. stlll very tired all the time. Drinks kaloua with cream at night. 2.5 drinks   Lower abdomen pain :  X 4 weeks right and left lower quadrant, bending sideways or forward makes it worse. Putting pressure on abdomen wall helps. Doesn't last long. BM this am: good amount every am.   No fever or chills, no dysuria.      No results found for: LDLCHOLESTEROL, LDLCALC    (goal LDL is <100)   AST (U/L)   Date Value   06/04/2018 15     ALT (U/L)   Date Value   06/04/2018 10     BUN (mg/dL)   Date Value   03/15/2019 13     BP Readings from Last 3 Encounters:   04/05/19 (!) 128/92   03/15/19 130/86   01/08/19 134/88          (goal 120/80)    Past Medical History:   Diagnosis Date    Alcoholism ([address] 10/20/2016    Anxiety     Cataract     Chronic pain     Depression     Hematochezia 4/7/2017    Hypothyroid     Osteoporosis     Peptic ulcer of duodenum     chronic    Schatzki's ring     Sessile rectal polyp 04/10/2017    multi tiny    Tobacco abuse     Tubular adenoma of colon 04/10/2017    x2      Past Surgical History:   Procedure Laterality Date    CARPAL TUNNEL RELEASE      CATARACT REMOVAL      COLONOSCOPY  04/10/2017    multi tiny sessile polyps; poor prep; tubular adenoma x 2    HERNIA REPAIR      HYSTERECTOMY      KYPHOSIS SURGERY  10/27/2016    kyphoplasty L1 and L5    IN COLSC FLX W/RMVL OF TUMOR POLYP LESION SNARE TQ N/A 4/10/2017    COLONOSCOPY POLYPECTOMY SNARE x2 with photos performed by Emelyn Alarcon MD at [address] [facility] EGD TRANSORAL BIOPSY SINGLE/MULTIPLE N/A 4/10/2017    EGD BIOPSY x2 with photos performed by Emelyn Alarcon MD at [address]  UPPER GASTROINTESTINAL ENDOSCOPY  04/10/2017    schatzki's ring; chronic peptic duodenitis       Family History   Problem Relation Age of Onset    Heart Disease Mother     Asthma Mother     Heart Disease Father        Social History     Tobacco Use    Smoking status: Current Every Day Smoker     Packs/day: 0.50     Years: 50.00     Pack years: 25.00     Types: Cigarettes    Smokeless tobacco: Never Used   Substance Use Topics    Alcohol use: Yes     Alcohol/week: 4.2 oz     Types: 7 Glasses of wine per week     Comment: 1/2 glass wine daily      Current Outpatient Medications   Medication Sig Dispense Refill    gabapentin (NEURONTIN) 100 MG capsule Take 3 capsules by mouth nightly for 30 days. 90 capsule 0    metroNIDAZOLE (FLAGYL) 500 MG tablet Take 1 tablet by mouth 3 times daily for 10 days 30 tablet 0    Fluticasone Furoate-Vilanterol (BREO ELLIPTA) 200-25 MCG/INH AEPB Inhale 1 Inhaler into the lungs daily 28 each 5    citalopram (CELEXA) 40 MG tablet TAKE ONE TABLET BY MOUTH DAILY 30 tablet 5    pantoprazole (PROTONIX) 40 MG tablet Take 1 tablet by mouth daily 30 tablet 3    albuterol sulfate HFA (PROVENTIL HFA) 108 (90 Base) MCG/ACT inhaler Inhale 1-2 puffs into the lungs every 4 hours as needed for Wheezing or Shortness of Breath (Space out to every 6 hours as symptoms improve) 1 Inhaler 2    Elastic Bandages & Supports (LUMBAR BACK BRACE/SUPPORT PAD) MISC 1 each by Does not apply route daily as needed (pain) 1 each 0    levothyroxine (SYNTHROID) 50 MCG tablet Take 1 tablet daily on Monday through Saturday, Skip on Sunday. 90 tablet 1    acetaminophen (TYLENOL) 325 MG tablet Take 2 tablets by mouth every 4 hours as needed for Pain or Fever 120 tablet 3    clonazePAM (KLONOPIN) 0.5 MG tablet Take 0.5 mg by mouth 3 times daily as needed (tremors) .       propranolol (INDERAL) 10 MG tablet Take 1 tablet by mouth 3 times daily For tremors, tachycardia 90 tablet 3    vitamin B-12 (CYANOCOBALAMIN) Abdominal: Soft. Bowel sounds are normal. She exhibits no distension and no mass. There is tenderness in the epigastric area, left upper quadrant and left lower quadrant. There is no rebound and no guarding. Mostly tender in the epigastrium and second most tender in LLQ   Musculoskeletal: She exhibits no edema. Lymphadenopathy:     She has no cervical adenopathy. Neurological: She is alert and oriented to person, place, and time. No cranial nerve deficit. Skin: Skin is warm and dry. She is not diaphoretic. No erythema. Psychiatric: She has a normal mood and affect. Her behavior is normal. Thought content normal.   Nursing note and vitals reviewed. Assessment:       Diagnosis Orders   1. Generalized abdominal pain  XR ABDOMEN (KUB) (SINGLE AP VIEW)    CT ABDOMEN PELVIS W IV CONTRAST    Creatinine, Serum    metroNIDAZOLE (FLAGYL) 500 MG tablet   2. Other chronic pain  gabapentin (NEURONTIN) 100 MG capsule   3. Alcoholism ([address]     4. Chronic obstructive pulmonary disease, unspecified COPD type ([address]     5. Fatigue, unspecified type  Creatinine, Serum      Probably diverticulitis  Plan:      Return in about 2 weeks (around 4/19/2019) for abdomen pain. Cut back gabapentin and see if daytime sleepiness is better  Must cut back or STOP drinking alcohol. Soft diet. Check KUB today  Orders Placed This Encounter   Procedures    XR ABDOMEN (KUB) (SINGLE AP VIEW)     Standing Status:   Future     Standing Expiration Date:   4/5/2020     Order Specific Question:   Reason for exam:     Answer:   pain    CT ABDOMEN PELVIS W IV CONTRAST     Standing Status:   Future     Standing Expiration Date:   4/4/2020    Creatinine, Serum     Standing Status:   Future     Standing Expiration Date:   4/5/2020     Orders Placed This Encounter   Medications    gabapentin (NEURONTIN) 100 MG capsule     Sig: Take 3 capsules by mouth nightly for 30 days.      Dispense:  90 capsule     Refill:  0    metroNIDAZOLE (FLAGYL) 500 MG tablet     Sig: Take 1 tablet by mouth 3 times daily for 10 days     Dispense:  30 tablet     Refill:  0       Patient given educational materials - see patient instructions. Discussed use, benefit, and side effects of prescribed medications. All patient questions answered. Pt voiced understanding. Reviewed health maintenance. Instructed with  medications, diet  Patient agreed with treatment plan. Follow up as directed.      Electronicallysigned by Parker Cole MD on 4/5/2019 at 3:44 PM

## 2019-04-08 LAB — URINE CULTURE, ROUTINE: NORMAL

## 2019-04-09 ENCOUNTER — TELEPHONE (OUTPATIENT)
Dept: PRIMARY CARE CLINIC | Age: 74
End: 2019-04-09

## 2019-04-09 NOTE — TELEPHONE ENCOUNTER
Gorge with [employer] left VM saying that the request that was made was approved for Pt.   [phone]  Ref# [id number]  Tried to call number given and got a busy signal

## 2019-04-11 ENCOUNTER — HOSPITAL ENCOUNTER (OUTPATIENT)
Dept: CT IMAGING | Age: 74
Discharge: HOME | End: 2019-04-13
Payer: COMMERCIAL

## 2019-04-11 DIAGNOSIS — R10.84 GENERALIZED ABDOMINAL PAIN: ICD-10-CM

## 2019-04-11 PROCEDURE — 2580000003 HC RX 258: Performed by: FAMILY MEDICINE

## 2019-04-11 PROCEDURE — 74177 CT ABD & PELVIS W/CONTRAST: CPT

## 2019-04-11 PROCEDURE — 6360000004 HC RX CONTRAST MEDICATION: Performed by: FAMILY MEDICINE

## 2019-04-11 RX ORDER — SODIUM CHLORIDE 0.9 % (FLUSH) 0.9 %
10 SYRINGE (ML) INJECTION PRN
Status: DISCONTINUED | OUTPATIENT
Start: 2019-04-11 | End: 2019-04-14 | Stop reason: HOSPADM

## 2019-04-11 RX ORDER — 0.9 % SODIUM CHLORIDE 0.9 %
80 INTRAVENOUS SOLUTION INTRAVENOUS ONCE
Status: COMPLETED | OUTPATIENT
Start: 2019-04-11 | End: 2019-04-11

## 2019-04-11 RX ADMIN — IOVERSOL 75 ML: 741 INJECTION INTRA-ARTERIAL; INTRAVENOUS at 14:36

## 2019-04-11 RX ADMIN — SODIUM CHLORIDE 80 ML: 9 INJECTION, SOLUTION INTRAVENOUS at 14:35

## 2019-04-11 RX ADMIN — Medication 10 ML: at 14:35

## 2019-04-11 RX ADMIN — IOHEXOL 50 ML: 240 INJECTION, SOLUTION INTRATHECAL; INTRAVASCULAR; INTRAVENOUS; ORAL at 14:36

## 2019-04-12 ENCOUNTER — NURSE ONLY (OUTPATIENT)
Dept: PRIMARY CARE CLINIC | Age: 74
End: 2019-04-12
Payer: COMMERCIAL

## 2019-04-12 DIAGNOSIS — M81.0 MENOPAUSAL OSTEOPOROSIS: Primary | Chronic | ICD-10-CM

## 2019-04-12 PROCEDURE — 96372 THER/PROPH/DIAG INJ SC/IM: CPT | Performed by: FAMILY MEDICINE

## 2019-04-12 NOTE — PROGRESS NOTES
After obtaining consent, and per orders of Dr. Matthew Russell, injection of prolia given in Right arm by [facility]. Patient instructed to remain in clinic for 20 minutes afterwards, and to report any adverse reaction to me immediately. Pt signed an ABN for the prolia.

## 2019-04-19 ENCOUNTER — OFFICE VISIT (OUTPATIENT)
Dept: PRIMARY CARE CLINIC | Age: 74
End: 2019-04-19
Payer: COMMERCIAL

## 2019-04-19 VITALS
BODY MASS INDEX: 27.7 KG/M2 | DIASTOLIC BLOOD PRESSURE: 82 MMHG | SYSTOLIC BLOOD PRESSURE: 106 MMHG | OXYGEN SATURATION: 95 % | WEIGHT: 146.6 LBS | HEART RATE: 74 BPM

## 2019-04-19 DIAGNOSIS — R07.81 RIB PAIN ON LEFT SIDE: Primary | ICD-10-CM

## 2019-04-19 DIAGNOSIS — R63.0 ANOREXIA: ICD-10-CM

## 2019-04-19 PROCEDURE — 99213 OFFICE O/P EST LOW 20 MIN: CPT | Performed by: FAMILY MEDICINE

## 2019-04-19 NOTE — PROGRESS NOTES
Smoking status: Current Every Day Smoker     Packs/day: 0.50     Years: 50.00     Pack years: 25.00     Types: Cigarettes    Smokeless tobacco: Never Used   Substance Use Topics    Alcohol use: Yes     Alcohol/week: 4.2 oz     Types: 7 Glasses of wine per week     Comment: 1/2 glass wine daily      Current Outpatient Medications   Medication Sig Dispense Refill    gabapentin (NEURONTIN) 100 MG capsule Take 3 capsules by mouth nightly for 30 days. 90 capsule 0    Fluticasone Furoate-Vilanterol (BREO ELLIPTA) 200-25 MCG/INH AEPB Inhale 1 Inhaler into the lungs daily 28 each 5    citalopram (CELEXA) 40 MG tablet TAKE ONE TABLET BY MOUTH DAILY 30 tablet 5    pantoprazole (PROTONIX) 40 MG tablet Take 1 tablet by mouth daily 30 tablet 3    albuterol sulfate HFA (PROVENTIL HFA) 108 (90 Base) MCG/ACT inhaler Inhale 1-2 puffs into the lungs every 4 hours as needed for Wheezing or Shortness of Breath (Space out to every 6 hours as symptoms improve) 1 Inhaler 2    Elastic Bandages & Supports (LUMBAR BACK BRACE/SUPPORT PAD) MISC 1 each by Does not apply route daily as needed (pain) 1 each 0    levothyroxine (SYNTHROID) 50 MCG tablet Take 1 tablet daily on Monday through Saturday, Skip on Sunday. 90 tablet 1    acetaminophen (TYLENOL) 325 MG tablet Take 2 tablets by mouth every 4 hours as needed for Pain or Fever 120 tablet 3    clonazePAM (KLONOPIN) 0.5 MG tablet Take 0.5 mg by mouth 3 times daily as needed (tremors) .       propranolol (INDERAL) 10 MG tablet Take 1 tablet by mouth 3 times daily For tremors, tachycardia 90 tablet 3    vitamin B-12 (CYANOCOBALAMIN) 1000 MCG tablet Take 1,000 mcg by mouth daily      Vitamin D (CHOLECALCIFEROL) 1000 UNITS CAPS capsule Take 1,000 Units by mouth daily      folic acid (FOLVITE) 1 MG tablet Take 1 mg by mouth daily      denosumab (PROLIA) 60 MG/ML SOLN SC injection Inject 1 mL into the skin once for 1 dose 1 mL 0     No current facility-administered medications for this rib pain. .  Eat yogurt daily and if diarrhea not better call back and will try imodium or Jamie Brightly. Try to eat a little more. Cut down drinking to once a day ( one kahlua and cream)    No orders of the defined types were placed in this encounter. No orders of the defined types were placed in this encounter. Patient given educational materials - see patient instructions. Discussed use, benefit, and side effects of prescribed medications. All patient questions answered. Pt voiced understanding. Reviewed health maintenance. Instructed to continue current medications, diet and exercise. Patient agreed with treatment plan. Follow up as directed.      Electronicallysigned by Ignacia Cockayne, MD on 4/19/2019 at 11:25 AM

## 2019-04-19 NOTE — PATIENT INSTRUCTIONS
Patient Education        Eating Healthy Foods: Care Instructions  Your Care Instructions    Eating healthy foods can help lower your risk for disease. Healthy food gives you energy and keeps your heart strong, your brain active, your muscles working, and your bones strong. A healthy diet includes a variety of foods from the basic food groups: grains, vegetables, fruits, milk and milk products, and meat and beans. Some people may eat more of their favorite foods from only one food group and, as a result, miss getting the nutrients they need. So, it is important to pay attention not only to what you eat but also to what you are missing from your diet. You can eat a healthy, balanced diet by making a few small changes. Follow-up care is a key part of your treatment and safety. Be sure to make and go to all appointments, and call your doctor if you are having problems. It's also a good idea to know your test results and keep a list of the medicines you take. How can you care for yourself at home? Look at what you eat  · Keep a food diary for a week or two and record everything you eat or drink. Track the number of servings you eat from each food group. · For a balanced diet every day, eat a variety of:  ? 6 or more ounce-equivalents of grains, such as cereals, breads, crackers, rice, or pasta, every day. An ounce-equivalent is 1 slice of bread, 1 cup of ready-to-eat cereal, or ½ cup of cooked rice, cooked pasta, or cooked cereal.  ? 2½ cups of vegetables, especially:  § Dark-green vegetables such as broccoli and spinach. § Orange vegetables such as carrots and sweet potatoes. § Dry beans (such as bradshaw and kidney beans) and peas (such as lentils). ? 2 cups of fresh, frozen, or canned fruit. A small apple or 1 banana or orange equals 1 cup. ? 3 cups of nonfat or low-fat milk, yogurt, or other milk products. ? 5½ ounces of meat and beans, such as chicken, fish, lean meat, beans, nuts, and seeds.  One egg, 1 tablespoon of peanut butter, ½ ounce nuts or seeds, or ¼ cup of cooked beans equals 1 ounce of meat. · Learn how to read food labels for serving sizes and ingredients. Fast-food and convenience-food meals often contain few or no fruits or vegetables. Make sure you eat some fruits and vegetables to make the meal more nutritious. · Look at your food diary. For each food group, add up what you have eaten and then divide the total by the number of days. This will give you an idea of how much you are eating from each food group. See if you can find some ways to change your diet to make it more healthy. Start small  · Do not try to make dramatic changes to your diet all at once. You might feel that you are missing out on your favorite foods and then be more likely to fail. · Start slowly, and gradually change your habits. Try some of the following:  ? Use whole wheat bread instead of white bread. ? Use nonfat or low-fat milk instead of whole milk. ? Eat brown rice instead of white rice, and eat whole wheat pasta instead of white-flour pasta. ? Try low-fat cheeses and low-fat yogurt. ? Add more fruits and vegetables to meals and have them for snacks. ? Add lettuce, tomato, cucumber, and onion to sandwiches. ? Add fruit to yogurt and cereal.  Enjoy food  · You can still eat your favorite foods. You just may need to eat less of them. If your favorite foods are high in fat, salt, and sugar, limit how often you eat them, but do not cut them out entirely. · Eat a wide variety of foods. Make healthy choices when eating out  · The type of restaurant you choose can help you make healthy choices. Even fast-food chains are now offering more low-fat or healthier choices on the menu. · Choose smaller portions, or take half of your meal home. · When eating out, try:  ? A veggie pizza with a whole wheat crust or grilled chicken (instead of sausage or pepperoni).   ? Pasta with roasted vegetables, grilled chicken, or marinara sauce instead of cream sauce. ? A vegetable wrap or grilled chicken wrap. ? Broiled or poached food instead of fried or breaded items. Make healthy choices easy  · Buy packaged, prewashed, ready-to-eat fresh vegetables and fruits, such as baby carrots, salad mixes, and chopped or shredded broccoli and cauliflower. · Buy packaged, presliced fruits, such as melon or pineapple. · Choose 100% fruit or vegetable juice instead of soda. Limit juice intake to 4 to 6 oz (½ to ¾ cup) a day. · Blend low-fat yogurt, fruit juice, and canned or frozen fruit to make a smoothie for breakfast or a snack. Where can you learn more? Go to https://EVERYWARE.Metooo. org and sign in to your [employer] account. Enter G332 in the iCents.net box to learn more about \"Eating Healthy Foods: Care Instructions. \"     If you do not have an account, please click on the \"Sign Up Now\" link. Current as of: March 28, 2018  Content Version: 11.9  © 9522-9146 [employer]. Care instructions adapted under license by [facility] [place] ([facility]). If you have questions about a medical condition or this instruction, always ask your healthcare professional. [address] any warranty or liability for your use of this information. Patient Education        Learning About Dietary Guidelines  What are the Dietary Guidelines for Americans? Dietary Guidelines for Americans provide tips for eating well and staying healthy. This helps reduce the risk for long-term (chronic) diseases. These adult guidelines from the [place] recommend that you:  · Eat lots of fruits, vegetables, whole grains, and low-fat or nonfat dairy products. · Try to balance your eating with your activity. This helps you stay at a healthy weight. · Drink alcohol in moderation, if at all. · Limit foods high in salt, saturated fat, trans fat, and added sugar. What is MyPlate?   MyPlate is the U.[place] government's food guide. It can help you make your own well-balanced eating plan. A balanced eating plan means that you eat enough, but not too much, and that your food gives you the nutrients you need to stay healthy. MyPlate focuses on eating plenty of whole grains, fruits, and vegetables, and on limiting fat and sugar. It is available online at www. ChooseMyPlate.gov. How can you get started? MyPlate suggests that most adults eat certain amounts from the different food groups:  Grains  Eat 5 to 8 ounces of grains each day. Half of those should be whole grains. Choose whole-grain breads, cold and cooked cereals and grains, pasta (without creamy sauces), hard rolls, or low-fat or fat-free crackers. Vegetables  Eat 2 to 3 cups of vegetables every day. They contain little if any fat. And they have lots of nutrients that help protect against heart disease. Fruits  Eat 1½ to 2 cups of fruits every day. Fruits contain very little fat but lots of nutrients. Protein foods  Most adults need 5 to 6½ ounces each day. Choose fish and lean poultry more often. Eat red meat and fried meats less often. Dried beans, tofu, and nuts are also good sources of protein. Dairy  Most adults need 3 cups of milk and milk products a day. Choose low-fat or fat-free products from this food group. If you have problems digesting milk, try eating cheese or yogurt instead. Limit fats and oils, including those used in cooking. When you do use fats, choose oils that are liquid at room temperature (unsaturated fats). These include canola oil and olive oil. Avoid foods with trans fats, such as many fried foods, cookies, and snack foods. Where can you learn more? Go to https://jean-claude."[employer]. org and sign in to your [employer] account. Enter C583 in the [employer] box to learn more about \"Learning About Dietary Guidelines. \"     If you do not have an account, please click on the \"Sign Up Now\" link.   Current as of: March 28, 2018  Content Version: 11.9  © 5389-1389 [employer]. Care instructions adapted under license by [facility] [place] ([facility]). If you have questions about a medical condition or this instruction, always ask your healthcare professional. [address] any warranty or liability for your use of this information. Patient Education        Musculoskeletal Chest Pain: Care Instructions  Your Care Instructions    Chest pain is not always a sign that something is wrong with your heart or that you have another serious problem. The doctor thinks your chest pain is caused by strained muscles or ligaments, inflamed chest cartilage, or another problem in your chest, rather than by your heart. You may need more tests to find the cause of your chest pain. Follow-up care is a key part of your treatment and safety. Be sure to make and go to all appointments, and call your doctor if you are having problems. It's also a good idea to know your test results and keep a list of the medicines you take. How can you care for yourself at home? · Take pain medicines exactly as directed. ? If the doctor gave you a prescription medicine for pain, take it as prescribed. ? If you are not taking a prescription pain medicine, ask your doctor if you can take an over-the-counter medicine. · Rest and protect the sore area. · Stop, change, or take a break from any activity that may be causing your pain or soreness. · Put ice or a cold pack on the sore area for 10 to 20 minutes at a time. Try to do this every 1 to 2 hours for the next 3 days (when you are awake) or until the swelling goes down. Put a thin cloth between the ice and your skin. · After 2 or 3 days, apply a heating pad set on low or a warm cloth to the area that hurts. Some doctors suggest that you go back and forth between hot and cold. · Do not wrap or tape your ribs for support.  This may cause you to take smaller breaths, which could increase your risk of lung problems. · Mentholated creams such as Bengay or Icy Hot may soothe sore muscles. Follow the instructions on the package. · Follow your doctor's instructions for exercising. · Gentle stretching and massage may help you get better faster. Stretch slowly to the point just before pain begins, and hold the stretch for at least 15 to 30 seconds. Do this 3 or 4 times a day. Stretch just after you have applied heat. · As your pain gets better, slowly return to your normal activities. Any increased pain may be a sign that you need to rest a while longer. When should you call for help? Call 911 anytime you think you may need emergency care. For example, call if:    · You have chest pain or pressure. This may occur with:  ? Sweating. ? Shortness of breath. ? Nausea or vomiting. ? Pain that spreads from the chest to the neck, jaw, or one or both shoulders or arms. ? Dizziness or lightheadedness. ? A fast or uneven pulse. After calling 911, chew 1 adult-strength aspirin. Wait for an ambulance. Do not try to drive yourself.     · You have sudden chest pain and shortness of breath, or you cough up blood.    Call your doctor now or seek immediate medical care if:    · You have any trouble breathing.     · Your chest pain gets worse.     · Your chest pain occurs consistently with exercise and is relieved by rest.    Watch closely for changes in your health, and be sure to contact your doctor if:    · Your chest pain does not get better after 1 week. Where can you learn more? Go to https://ShipHawkpeDIIME.Woven Systems. org and sign in to your [employer] account. Enter 7735 [id number] in the [facility] box to learn more about \"Musculoskeletal Chest Pain: Care Instructions. \"     If you do not have an account, please click on the \"Sign Up Now\" link. Current as of: September 23, 2018  Content Version: 11.9  © 2816-6809 [employer]. Care instructions adapted under license by [facility] ([facility]).  If you have questions about a medical condition or this instruction, always ask your healthcare professional. [place] [address] any warranty or liability for your use of this information.

## 2019-04-25 DIAGNOSIS — G89.29 OTHER CHRONIC PAIN: ICD-10-CM

## 2019-04-25 RX ORDER — GABAPENTIN 100 MG/1
CAPSULE ORAL
Qty: 180 CAPSULE | Refills: 1 | Status: SHIPPED | OUTPATIENT
Start: 2019-04-25 | End: 2019-05-23 | Stop reason: SDUPTHER

## 2019-05-23 ENCOUNTER — OFFICE VISIT (OUTPATIENT)
Dept: PRIMARY CARE CLINIC | Age: 74
End: 2019-05-23
Payer: COMMERCIAL

## 2019-05-23 VITALS
OXYGEN SATURATION: 95 % | WEIGHT: 147.4 LBS | SYSTOLIC BLOOD PRESSURE: 136 MMHG | HEART RATE: 67 BPM | BODY MASS INDEX: 27.85 KG/M2 | DIASTOLIC BLOOD PRESSURE: 82 MMHG

## 2019-05-23 DIAGNOSIS — R07.81 RIB PAIN ON LEFT SIDE: Primary | ICD-10-CM

## 2019-05-23 DIAGNOSIS — J44.9 CHRONIC OBSTRUCTIVE PULMONARY DISEASE, UNSPECIFIED COPD TYPE (HCC): ICD-10-CM

## 2019-05-23 DIAGNOSIS — G89.29 OTHER CHRONIC PAIN: ICD-10-CM

## 2019-05-23 PROCEDURE — 99213 OFFICE O/P EST LOW 20 MIN: CPT | Performed by: FAMILY MEDICINE

## 2019-05-23 RX ORDER — GABAPENTIN 100 MG/1
CAPSULE ORAL
Qty: 150 CAPSULE | Refills: 1 | Status: SHIPPED | OUTPATIENT
Start: 2019-05-23 | End: 2019-06-30 | Stop reason: SINTOL

## 2019-05-23 ASSESSMENT — ENCOUNTER SYMPTOMS: SHORTNESS OF BREATH: 0

## 2019-05-23 NOTE — PATIENT INSTRUCTIONS
Patient Education        Learning About Eating More Fruits and Vegetables  What are some quick tips for eating more fruits and vegetables? We're all encouraged to eat more fruits and vegetables. Yet it can seem like one more chore on the daily to-do list. But you can add color and crunch to your meals--and lots of nutrition--with these quick tips. · Brighten up your breakfast.  ? Add sliced fruit or frozen berries to your yogurt, pancakes, or cereal.  ? Blend fresh or frozen fruit, veggies, and yogurt with a little fruit juice, and you've got a tasty smoothie. ? Make your scrambled eggs a gourmet treat by adding onions, celery, and bell peppers. ? Bake up some bran muffins with grated carrots added into the mix. · Make a livelier lunch. ? Jazz up tuna or chicken salad with apple chunks, celery, or grapes--or all of them! ? Add cucumbers, avocado slices, tomatoes, and lettuce to your sandwiches. ? Kick up the flavor of grilled cheese sandwiches with spinach and tomatoes. ? Puree some potatoes or squash to add to tomato soup. · Add delicious veggies to dinner. ? Give more color and taste to salads. Stir in red cabbage, carrots, and bell peppers. Top salads with dried cranberries or raisins. \"Frost\" your salad with orange sections or strawberries. ? Keep a bag or two of frozen vegetables ready to pull out of the freezer for a side dish. ? Spice up spaghetti and meatballs with mushrooms and bell peppers. ? Roast vegetables like cauliflower or squash in the oven with olive oil to bring out their flavor. ? Season your veggie dish with herbs like basil and rodrigo and a splash of lemon juice and olive oil. ? If you've got a main dish in the oven, stick in a potato to round out your meal.  · Grab some healthy snacks on the go. ? Scoop up an apple, banana, or plum for a quick snack. ? Cut up raw fruits and veggies to keep in your fridge. Grapes, oranges, carrots, and celery are great choices.  They'll be ready for a quick snack or an after-school treat. ? Dip raw vegetables in hummus or peanut butter. ? Keep dried fruit on hand for an easy \"take with you\" snack. · Make something sweet--and healthy. ? Try baked apples or pears topped with cinnamon and honey for a delicious dessert. ? Make chocolate chip cookies even better with grated carrots added to the mix. Where can you learn more? Go to https://FUELUP.Newgistics. org and sign in to your Agavideo account. Enter F050 in the FairShare box to learn more about \"Learning About Eating More Fruits and Vegetables. \"     If you do not have an account, please click on the \"Sign Up Now\" link. Current as of: November 7, 2018  Content Version: 12.0  © 8535-6456 [employer]. Care instructions adapted under license by [facility] ([facility]). If you have questions about a medical condition or this instruction, always ask your healthcare professional. [place] [address] any warranty or liability for your use of this information. Patient Education        Learning About Dietary Guidelines  What are the Dietary Guidelines for Americans? Dietary Guidelines for Americans provide tips for eating well and staying healthy. This helps reduce the risk for long-term (chronic) diseases. These adult guidelines from the [place] recommend that you:  · Eat lots of fruits, vegetables, whole grains, and low-fat or nonfat dairy products. · Try to balance your eating with your activity. This helps you stay at a healthy weight. · Drink alcohol in moderation, if at all. · Limit foods high in salt, saturated fat, trans fat, and added sugar. What is MyPlate? MyPlate is the U.S. government's food guide. It can help you make your own well-balanced eating plan. A balanced eating plan means that you eat enough, but not too much, and that your food gives you the nutrients you need to stay healthy.   MyPlate focuses on eating plenty of whole grains, fruits, and vegetables, and on limiting fat and sugar. It is available online at www. ChooseMyPlate.gov. How can you get started? MyPlate suggests that most adults eat certain amounts from the different food groups:  Grains  Eat 5 to 8 ounces of grains each day. Half of those should be whole grains. Choose whole-grain breads, cold and cooked cereals and grains, pasta (without creamy sauces), hard rolls, or low-fat or fat-free crackers. Vegetables  Eat 2 to 3 cups of vegetables every day. They contain little if any fat. And they have lots of nutrients that help protect against heart disease. Fruits  Eat 1½ to 2 cups of fruits every day. Fruits contain very little fat but lots of nutrients. Protein foods  Most adults need 5 to 6½ ounces each day. Choose fish and lean poultry more often. Eat red meat and fried meats less often. Dried beans, tofu, and nuts are also good sources of protein. Dairy  Most adults need 3 cups of milk and milk products a day. Choose low-fat or fat-free products from this food group. If you have problems digesting milk, try eating cheese or yogurt instead. Limit fats and oils, including those used in cooking. When you do use fats, choose oils that are liquid at room temperature (unsaturated fats). These include canola oil and olive oil. Avoid foods with trans fats, such as many fried foods, cookies, and snack foods. Where can you learn more? Go to https://UrbanBuztyrell.healthSemetric. org and sign in to your Car Loan 4U account. Enter T025 in the [facility] box to learn more about \"Learning About Dietary Guidelines. \"     If you do not have an account, please click on the \"Sign Up Now\" link. Current as of: November 7, 2018  Content Version: 12.0  © 6115-5707 Healthwise, Incorporated. Care instructions adapted under license by [facility] [place] ([facility]).  If you have questions about a medical condition or this instruction, always ask your healthcare professional. ClipCard[employer]orporated disclaims any warranty or liability for your use of this information.

## 2019-06-26 RX ORDER — ALBUTEROL SULFATE 90 UG/1
1-2 AEROSOL, METERED RESPIRATORY (INHALATION) EVERY 4 HOURS PRN
Qty: 1 INHALER | Refills: 2 | Status: SHIPPED | OUTPATIENT
Start: 2019-06-26 | End: 2019-09-09 | Stop reason: SDUPTHER

## 2019-06-28 ENCOUNTER — TELEPHONE (OUTPATIENT)
Dept: PRIMARY CARE CLINIC | Age: 74
End: 2019-06-28

## 2019-06-28 DIAGNOSIS — E03.9 HYPOTHYROIDISM, UNSPECIFIED TYPE: ICD-10-CM

## 2019-06-28 NOTE — TELEPHONE ENCOUNTER
Susan Hare with [facility] called to notify Gerda Palacio that patient has been very tired with low energy. She said she has been falling asleep smoking cigarettes and sleeping the entire day. She suggested patient makes appt with you soon, patient did schedule appt for 7/5/19.

## 2019-07-01 RX ORDER — LEVOTHYROXINE SODIUM 0.05 MG/1
TABLET ORAL
Qty: 77 TABLET | Refills: 0 | Status: SHIPPED | OUTPATIENT
Start: 2019-07-01 | End: 2019-09-09 | Stop reason: SDUPTHER

## 2019-07-01 NOTE — TELEPHONE ENCOUNTER
Left message with Bety Perdomo at [facility].  Also spoke with patient and advised to cut down on pain med.'

## 2019-07-05 ENCOUNTER — OFFICE VISIT (OUTPATIENT)
Dept: PRIMARY CARE CLINIC | Age: 74
End: 2019-07-05
Payer: COMMERCIAL

## 2019-07-05 ENCOUNTER — HOSPITAL ENCOUNTER (OUTPATIENT)
Age: 74
Setting detail: SPECIMEN
Discharge: HOME | End: 2019-07-05
Payer: COMMERCIAL

## 2019-07-05 VITALS
DIASTOLIC BLOOD PRESSURE: 76 MMHG | SYSTOLIC BLOOD PRESSURE: 120 MMHG | BODY MASS INDEX: 28.61 KG/M2 | OXYGEN SATURATION: 94 % | HEART RATE: 75 BPM | WEIGHT: 151.4 LBS

## 2019-07-05 DIAGNOSIS — E03.9 HYPOTHYROIDISM, UNSPECIFIED TYPE: ICD-10-CM

## 2019-07-05 DIAGNOSIS — F10.20 ALCOHOLISM (HCC): ICD-10-CM

## 2019-07-05 DIAGNOSIS — G89.29 OTHER CHRONIC PAIN: ICD-10-CM

## 2019-07-05 DIAGNOSIS — G47.30 SLEEP APNEA, UNSPECIFIED TYPE: ICD-10-CM

## 2019-07-05 DIAGNOSIS — G47.9 SLEEP DISORDER: ICD-10-CM

## 2019-07-05 DIAGNOSIS — R53.83 FATIGUE, UNSPECIFIED TYPE: ICD-10-CM

## 2019-07-05 DIAGNOSIS — E03.9 HYPOTHYROIDISM, UNSPECIFIED TYPE: Primary | ICD-10-CM

## 2019-07-05 LAB
THYROXINE, FREE: 1.27 NG/DL (ref 0.93–1.7)
TSH SERPL DL<=0.05 MIU/L-ACNC: 1.13 MIU/L (ref 0.3–5)

## 2019-07-05 PROCEDURE — 99213 OFFICE O/P EST LOW 20 MIN: CPT | Performed by: FAMILY MEDICINE

## 2019-07-05 RX ORDER — BUPROPION HYDROCHLORIDE 150 MG/1
150 TABLET ORAL 2 TIMES DAILY
Qty: 60 TABLET | Refills: 3 | Status: SHIPPED | OUTPATIENT
Start: 2019-07-05 | End: 2019-09-09

## 2019-07-05 NOTE — PROGRESS NOTES
Dimas Robles a 68 y.o. female who presents today for her medical conditions/complaints as notedbelow. Chief Complaint   Patient presents with    Follow-up     d/c'ed gabapentin due to excessive sleepiness/low energy    Nicotine Dependence     wants something to help with alcohol & nicotine addiction          HPI:   HPI  Still feels tired  She is not sleeping as much during the day per patient  Spouse states she sleeps a lot still. Smoking 1.5 ppd  Drinking 1 1/2 drinks mixed drinks ( baileys and kahlua. )    Abd pain is gone: better after constipation cleared up. Constant Back pain: no changes. Uses biofreeze. Watches TV till she falls asleep, sleeps in her recliner. [employer] channel. Weakness and falling.      No results found for: LDLCHOLESTEROL, LDLCALC    (goal LDL is <100)   AST (U/L)   Date Value   06/04/2018 15     ALT (U/L)   Date Value   06/04/2018 10     BUN (mg/dL)   Date Value   03/15/2019 13     BP Readings from Last 3 Encounters:   07/05/19 120/76   05/23/19 136/82   04/19/19 106/82          (goal 120/80)    Past Medical History:   Diagnosis Date    Alcoholism ([address] 10/20/2016    Anxiety     Cataract     Chronic pain     Depression     Hematochezia 4/7/2017    Hypothyroid     Osteoporosis     Peptic ulcer of duodenum     chronic    Schatzki's ring     Sessile rectal polyp 04/10/2017    multi tiny    Tobacco abuse     Tubular adenoma of colon 04/10/2017    x2      Past Surgical History:   Procedure Laterality Date    CARPAL TUNNEL RELEASE      CATARACT REMOVAL      COLONOSCOPY  04/10/2017    multi tiny sessile polyps; poor prep; tubular adenoma x 2    HERNIA REPAIR      HYSTERECTOMY      KYPHOSIS SURGERY  10/27/2016    kyphoplasty L1 and L5    OR COLSC FLX W/RMVL OF TUMOR POLYP LESION SNARE TQ N/A 4/10/2017    COLONOSCOPY POLYPECTOMY SNARE x2 with photos performed by Bryant Miranda MD at [address] EGD TRANSORAL BIOPSY SINGLE/MULTIPLE N/A 4/10/2017    EGD Musculoskeletal: She exhibits no edema. Gait is slow, uses walker. Generalized weakness, has trouble getting up from chair   Lymphadenopathy:     She has no cervical adenopathy. Neurological: She is alert and oriented to person, place, and time. Skin: Skin is warm. No rash noted. Psychiatric: She has a normal mood and affect. Her behavior is normal.   Nursing note and vitals reviewed. Assessment:       Diagnosis Orders   1. Hypothyroidism, unspecified type  TSH 3RD GENERATION    T4, Free   2. Other chronic pain  buPROPion (WELLBUTRIN XL) 150 MG extended release tablet   3. Fatigue, unspecified type  buPROPion (WELLBUTRIN XL) 150 MG extended release tablet    Sleep Study with PAP Titration   4. Alcoholism (HCC)  buPROPion (WELLBUTRIN XL) 150 MG extended release tablet   5. Sleep disorder  Sleep Study with PAP Titration   6. Sleep apnea, unspecified type   Sleep Study with PAP Titration        Plan:      Return in about 1 month (around 8/2/2019) for 3-4 weeks for new med. Sleep apnea questionairre +  Start wellbutrin, consider decreasing celexa next OV. Orders Placed This Encounter   Procedures    TSH 3RD GENERATION     Standing Status:   Future     Number of Occurrences:   1     Standing Expiration Date:   7/4/2020    T4, Free     Standing Status:   Future     Number of Occurrences:   1     Standing Expiration Date:   7/4/2020    Sleep Study with PAP Titration     Standing Status:   Future     Standing Expiration Date:   7/5/2020     Order Specific Question:   Sleep Study Titration Type     Answer:   Split Night Study (Baseline followed by PAP Titration)     Order Specific Question:   Location For Sleep Study     Answer: [facility] Specific Question:   Select Sleep Lab Location     Answer:   [facility]     Order Specific Question:   Pre-Study Patient Questions: Answer:   Complains of daytime sleepiness     Order Specific Question:   Pre-Study Patient Questions:      Answer:

## 2019-07-15 PROBLEM — N39.490 OVERFLOW INCONTINENCE: Chronic | Status: ACTIVE | Noted: 2019-07-15

## 2019-07-16 ENCOUNTER — TELEPHONE (OUTPATIENT)
Dept: PRIMARY CARE CLINIC | Age: 74
End: 2019-07-16

## 2019-07-19 ENCOUNTER — TELEPHONE (OUTPATIENT)
Dept: PRIMARY CARE CLINIC | Age: 74
End: 2019-07-19

## 2019-07-19 RX ORDER — IBUPROFEN 200 MG
400 TABLET ORAL
COMMUNITY
End: 2019-12-27 | Stop reason: SINTOL

## 2019-07-23 ENCOUNTER — TELEPHONE (OUTPATIENT)
Dept: PRIMARY CARE CLINIC | Age: 74
End: 2019-07-23

## 2019-07-24 ENCOUNTER — APPOINTMENT (OUTPATIENT)
Dept: GENERAL RADIOLOGY | Age: 74
End: 2019-07-24
Payer: COMMERCIAL

## 2019-07-24 ENCOUNTER — TELEPHONE (OUTPATIENT)
Dept: PRIMARY CARE CLINIC | Age: 74
End: 2019-07-24

## 2019-07-24 ENCOUNTER — HOSPITAL ENCOUNTER (EMERGENCY)
Age: 74
Discharge: NURSING, REHAB OR LONG-TERM CARE | End: 2019-07-24
Attending: EMERGENCY MEDICINE
Payer: COMMERCIAL

## 2019-07-24 VITALS
OXYGEN SATURATION: 93 % | DIASTOLIC BLOOD PRESSURE: 72 MMHG | BODY MASS INDEX: 28.51 KG/M2 | SYSTOLIC BLOOD PRESSURE: 110 MMHG | WEIGHT: 151 LBS | TEMPERATURE: 97.9 F | HEIGHT: 61 IN | RESPIRATION RATE: 16 BRPM | HEART RATE: 72 BPM

## 2019-07-24 DIAGNOSIS — S42.294A OTHER CLOSED NONDISPLACED FRACTURE OF PROXIMAL END OF RIGHT HUMERUS, INITIAL ENCOUNTER: Primary | ICD-10-CM

## 2019-07-24 PROCEDURE — 73090 X-RAY EXAM OF FOREARM: CPT

## 2019-07-24 PROCEDURE — 96374 THER/PROPH/DIAG INJ IV PUSH: CPT

## 2019-07-24 PROCEDURE — 6370000000 HC RX 637 (ALT 250 FOR IP): Performed by: EMERGENCY MEDICINE

## 2019-07-24 PROCEDURE — 73060 X-RAY EXAM OF HUMERUS: CPT

## 2019-07-24 PROCEDURE — 73130 X-RAY EXAM OF HAND: CPT

## 2019-07-24 PROCEDURE — 6360000002 HC RX W HCPCS: Performed by: EMERGENCY MEDICINE

## 2019-07-24 PROCEDURE — 71045 X-RAY EXAM CHEST 1 VIEW: CPT

## 2019-07-24 PROCEDURE — 99284 EMERGENCY DEPT VISIT MOD MDM: CPT

## 2019-07-24 RX ORDER — HYDROCODONE BITARTRATE AND ACETAMINOPHEN 5; 325 MG/1; MG/1
2 TABLET ORAL ONCE
Status: COMPLETED | OUTPATIENT
Start: 2019-07-24 | End: 2019-07-24

## 2019-07-24 RX ORDER — HYDROCODONE BITARTRATE AND ACETAMINOPHEN 5; 325 MG/1; MG/1
1 TABLET ORAL EVERY 6 HOURS PRN
Qty: 24 TABLET | Refills: 0 | Status: SHIPPED | OUTPATIENT
Start: 2019-07-24 | End: 2019-07-29 | Stop reason: SDUPTHER

## 2019-07-24 RX ORDER — FENTANYL CITRATE 50 UG/ML
25 INJECTION, SOLUTION INTRAMUSCULAR; INTRAVENOUS ONCE
Status: COMPLETED | OUTPATIENT
Start: 2019-07-24 | End: 2019-07-24

## 2019-07-24 RX ADMIN — FENTANYL CITRATE 25 MCG: 50 INJECTION, SOLUTION INTRAMUSCULAR; INTRAVENOUS at 16:22

## 2019-07-24 RX ADMIN — HYDROCODONE BITARTRATE AND ACETAMINOPHEN 2 TABLET: 5; 325 TABLET ORAL at 13:00

## 2019-07-24 ASSESSMENT — PAIN SCALES - GENERAL
PAINLEVEL_OUTOF10: 7
PAINLEVEL_OUTOF10: 4

## 2019-07-24 NOTE — ED NOTES
Bed: 16  Expected date:   Expected time:   Means of arrival:   Comments:  Rita Yang RN  07/24/19 0106

## 2019-07-24 NOTE — DISCHARGE INSTR - COC
Continuity of Care Form    Patient Name: Joanne Blair   :  1945  MRN:  [id number]    Admit date:  2019  Discharge date:  2019    Code Status Order: Prior   Advance Directives:     Admitting Physician:  No admitting provider for patient encounter. PCP: Sara Womack MD    Discharging Nurse: [facility] Unit/Room#: 16/16  Discharging Unit Phone Number: [phone]    Emergency Contact:   Extended Emergency Contact Information  Primary Emergency Contact: Darío Eric [place] of [address] Phone: [phone]  Relation: Child  Secondary Emergency Contact: Miller Ray  Address: .            [address] of [address] Phone: [phone]  Relation: Spouse    Past Surgical History:  Past Surgical History:   Procedure Laterality Date    CARPAL TUNNEL RELEASE      CATARACT REMOVAL      COLONOSCOPY  04/10/2017    multi tiny sessile polyps; poor prep; tubular adenoma x 2    HERNIA REPAIR      HYSTERECTOMY      KYPHOSIS SURGERY  10/27/2016    kyphoplasty L1 and L5    IA COLSC FLX W/RMVL OF TUMOR POLYP LESION SNARE TQ N/A 4/10/2017    COLONOSCOPY POLYPECTOMY SNARE x2 with photos performed by Susan Nelson MD at [address] [facility] EGD TRANSORAL BIOPSY SINGLE/MULTIPLE N/A 4/10/2017    EGD BIOPSY x2 with photos performed by Susan Nelson MD at [id number] [place] [address] ENDOSCOPY  04/10/2017    schatzki's ring; chronic peptic duodenitis       Immunization History:   Immunization History   Administered Date(s) Administered    Influenza Virus Vaccine 2013, 10/30/2014    Influenza, High Dose (Fluzone 65 yrs and older) 2015, 2017, 2018    Influenza, Karalee Stoll, 3 yrs and older, IM, PF (Fluzone 3 yrs and older or Afluria 5 yrs and older) 10/20/2016    Pneumococcal Conjugate 13-valent ([id number]) 2015    Pneumococcal Polysaccharide ([id number]) 10/20/2016    Tdap

## 2019-07-24 NOTE — PROGRESS NOTES
MARTHA informed that family is interested in [employer]. MARTHA did send referral and spoke to [facility] in admissions. She will try to get pt approved under long term benefit.

## 2019-07-24 NOTE — ED PROVIDER NOTES
through the proximal half of the right humeral   diaphysis. Right forearm:      1. Mild osteoarthrosis. 2. No acute osseous abnormality. 3. Soft tissue swelling of the distal right forearm. Right hand:      1. Mild osteoarthrosis. Diffuse osteopenia. 2. No acute fracture or gross dislocation. XR RADIUS ULNA RIGHT (2 VIEWS)   Final Result   Right humerus:      1. Acute displaced surgical neck fracture in the proximal right humerus. 2. Spiral type fracture through the proximal half of the right humeral   diaphysis. Right forearm:      1. Mild osteoarthrosis. 2. No acute osseous abnormality. 3. Soft tissue swelling of the distal right forearm. Right hand:      1. Mild osteoarthrosis. Diffuse osteopenia. 2. No acute fracture or gross dislocation. XR HAND RIGHT (MIN 3 VIEWS)   Final Result   Right humerus:      1. Acute displaced surgical neck fracture in the proximal right humerus. 2. Spiral type fracture through the proximal half of the right humeral   diaphysis. Right forearm:      1. Mild osteoarthrosis. 2. No acute osseous abnormality. 3. Soft tissue swelling of the distal right forearm. Right hand:      1. Mild osteoarthrosis. Diffuse osteopenia. 2. No acute fracture or gross dislocation. LABS: All lab results were reviewed by myself, and all abnormals are listed below. Labs Reviewed - No data to display  EMERGENCY DEPARTMENTCOURSE:         Vitals:    Vitals:    07/24/19 1117   BP: 114/78   Pulse: 75   Resp: 18   Temp: 98 °F (36.7 °C)   TempSrc: Oral   SpO2: 98%   Weight: 151 lb (68.5 kg)   Height: 5' 1\" (1.549 m)       The patient was given the following medications while in the emergency department:  Orders Placed This Encounter   Medications    HYDROcodone-acetaminophen (NORCO) 5-325 MG per tablet 2 tablet    HYDROcodone-acetaminophen (NORCO) 5-325 MG per tablet     Sig: Take 1 tablet by mouth every 6 hours as needed for Pain for up to 6 days. Intended supply: 3 days. Take lowest dose possible to manage pain     Dispense:  24 tablet     Refill:  0     CONSULTS:  None    FINAL IMPRESSION      1. Other closed nondisplaced fracture of proximal end of right humerus, initial encounter          DISPOSITION/PLAN   DISPOSITION Decision To Discharge 07/24/2019 01:13:19 PM      PATIENT REFERRED TO:  Angelique Dang MD  [address] [facility]  [phone]    In 1 day      Melania Nguyen MD  [address] [place] [address]  [phone]    In 2 days      DISCHARGE MEDICATIONS:  New Prescriptions    HYDROCODONE-ACETAMINOPHEN (NORCO) 5-325 MG PER TABLET    Take 1 tablet by mouth every 6 hours as needed for Pain for up to 6 days. Intended supply: 3 days.  Take lowest dose possible to manage pain     Cuca Cruz MD  Attending Emergency Physician                   Nathaniel Alvarez MD  07/24/19 8492

## 2019-07-25 ENCOUNTER — TELEPHONE (OUTPATIENT)
Dept: PRIMARY CARE CLINIC | Age: 74
End: 2019-07-25

## 2019-07-25 DIAGNOSIS — S42.91XD CLOSED FRACTURE OF RIGHT SHOULDER WITH ROUTINE HEALING, SUBSEQUENT ENCOUNTER: Primary | ICD-10-CM

## 2019-07-29 ENCOUNTER — TELEPHONE (OUTPATIENT)
Dept: PRIMARY CARE CLINIC | Age: 74
End: 2019-07-29

## 2019-07-29 DIAGNOSIS — M25.511 RIGHT SHOULDER PAIN, UNSPECIFIED CHRONICITY: Primary | ICD-10-CM

## 2019-07-29 DIAGNOSIS — R25.1 TREMOR: Primary | ICD-10-CM

## 2019-07-29 DIAGNOSIS — S42.91XD CLOSED FRACTURE OF RIGHT SHOULDER WITH ROUTINE HEALING, SUBSEQUENT ENCOUNTER: Primary | ICD-10-CM

## 2019-07-29 DIAGNOSIS — S42.294A OTHER CLOSED NONDISPLACED FRACTURE OF PROXIMAL END OF RIGHT HUMERUS, INITIAL ENCOUNTER: ICD-10-CM

## 2019-07-29 RX ORDER — HYDROCODONE BITARTRATE AND ACETAMINOPHEN 5; 325 MG/1; MG/1
1 TABLET ORAL EVERY 4 HOURS PRN
Qty: 50 TABLET | Refills: 0 | Status: SHIPPED | OUTPATIENT
Start: 2019-07-29 | End: 2019-08-08 | Stop reason: SDUPTHER

## 2019-07-29 RX ORDER — HYDROCODONE BITARTRATE AND ACETAMINOPHEN 5; 325 MG/1; MG/1
1 TABLET ORAL EVERY 6 HOURS PRN
Qty: 40 TABLET | Refills: 0 | Status: SHIPPED | OUTPATIENT
Start: 2019-07-29 | End: 2019-07-29 | Stop reason: SDUPTHER

## 2019-07-29 RX ORDER — CLONAZEPAM 0.5 MG/1
0.5 TABLET ORAL 3 TIMES DAILY PRN
Qty: 30 TABLET | Refills: 0 | Status: SHIPPED | OUTPATIENT
Start: 2019-07-29 | End: 2019-09-09 | Stop reason: SDUPTHER

## 2019-07-29 NOTE — TELEPHONE ENCOUNTER
Patient daughter Jie Hoffman calling to get a referral for ortho to see Dr. Laxmi Campos out of [address] [place] [address]. Shoulder broken--crown is broken? Fracture? Please advise when order is in.

## 2019-07-31 ENCOUNTER — OFFICE VISIT (OUTPATIENT)
Dept: ORTHOPEDIC SURGERY | Age: 74
End: 2019-07-31
Payer: COMMERCIAL

## 2019-07-31 VITALS — BODY MASS INDEX: 28.51 KG/M2 | WEIGHT: 151.01 LBS | HEIGHT: 61 IN

## 2019-07-31 DIAGNOSIS — S42.294A OTHER CLOSED NONDISPLACED FRACTURE OF PROXIMAL END OF RIGHT HUMERUS, INITIAL ENCOUNTER: Primary | ICD-10-CM

## 2019-07-31 PROCEDURE — 99203 OFFICE O/P NEW LOW 30 MIN: CPT | Performed by: ORTHOPAEDIC SURGERY

## 2019-08-05 ENCOUNTER — TELEPHONE (OUTPATIENT)
Dept: PRIMARY CARE CLINIC | Age: 74
End: 2019-08-05

## 2019-08-05 NOTE — TELEPHONE ENCOUNTER
chantix is a pill, there are potential SE which would not be good for her. IF she wants to quit and willing to use the nicotine patch I could discuss that witih her next time I see her. IF she does want to try the patch then she CANNOT smoke when using the patch because she could get a nicotine overdose. Is she in rehab facility for her shoulder or at home ?

## 2019-08-05 NOTE — PROGRESS NOTES
tremors, tachycardia 90 tablet 3    vitamin B-12 (CYANOCOBALAMIN) 1000 MCG tablet Take 1,000 mcg by mouth daily      Vitamin D (CHOLECALCIFEROL) 1000 UNITS CAPS capsule Take 1,000 Units by mouth daily      folic acid (FOLVITE) 1 MG tablet Take 1 mg by mouth daily       No current facility-administered medications for this visit. Review of Systems   Musculoskeletal: Positive for arthralgias, joint swelling and myalgias. Past Medical History:   Diagnosis Date    Alcoholism ([address] 10/20/2016    Anxiety     Cataract     Chronic pain     Depression     Hematochezia 4/7/2017    Hypothyroid     Osteoporosis     Peptic ulcer of duodenum     chronic    Schatzki's ring     Sessile rectal polyp 04/10/2017    multi tiny    Tobacco abuse     Tubular adenoma of colon 04/10/2017    x2     Past Surgical History:   Procedure Laterality Date    CARPAL TUNNEL RELEASE      CATARACT REMOVAL      COLONOSCOPY  04/10/2017    multi tiny sessile polyps; poor prep; tubular adenoma x 2    HERNIA REPAIR      HYSTERECTOMY      KYPHOSIS SURGERY  10/27/2016    kyphoplasty L1 and L5    OR COLSC FLX W/RMVL OF TUMOR POLYP LESION SNARE TQ N/A 4/10/2017    COLONOSCOPY POLYPECTOMY SNARE x2 with photos performed by Catherine Herrera MD at [address] EGD TRANSORAL BIOPSY SINGLE/MULTIPLE N/A 4/10/2017    EGD BIOPSY x2 with photos performed by Catherine Herrera MD at [facility] ARTHROSCOPY      TUBAL LIGATION      UPPER GASTROINTESTINAL ENDOSCOPY  04/10/2017    schatzki's ring; chronic peptic duodenitis     Family History   Problem Relation Age of Onset    Heart Disease Mother     Asthma Mother     Heart Disease Father      Social History     Tobacco Use    Smoking status: Current Every Day Smoker     Packs/day: 0.50     Years: 50.00     Pack years: 25.00     Types: Cigarettes    Smokeless tobacco: Never Used   Substance Use Topics    Alcohol use:  Yes     Alcohol/week: 7.0 standard drinks     Types: 7 Glasses of

## 2019-08-08 DIAGNOSIS — S42.294A OTHER CLOSED NONDISPLACED FRACTURE OF PROXIMAL END OF RIGHT HUMERUS, INITIAL ENCOUNTER: ICD-10-CM

## 2019-08-08 RX ORDER — HYDROCODONE BITARTRATE AND ACETAMINOPHEN 5; 325 MG/1; MG/1
1 TABLET ORAL EVERY 4 HOURS PRN
Qty: 50 TABLET | Refills: 0 | Status: SHIPPED | OUTPATIENT
Start: 2019-08-08 | End: 2019-08-18

## 2019-08-16 ENCOUNTER — TELEPHONE (OUTPATIENT)
Dept: PRIMARY CARE CLINIC | Age: 74
End: 2019-08-16

## 2019-08-19 ENCOUNTER — OFFICE VISIT (OUTPATIENT)
Dept: ORTHOPEDIC SURGERY | Age: 74
End: 2019-08-19
Payer: COMMERCIAL

## 2019-08-19 VITALS — WEIGHT: 151.01 LBS | BODY MASS INDEX: 28.51 KG/M2 | HEIGHT: 61 IN

## 2019-08-19 DIAGNOSIS — M25.511 RIGHT SHOULDER PAIN, UNSPECIFIED CHRONICITY: Primary | ICD-10-CM

## 2019-08-19 DIAGNOSIS — S42.294A OTHER CLOSED NONDISPLACED FRACTURE OF PROXIMAL END OF RIGHT HUMERUS, INITIAL ENCOUNTER: ICD-10-CM

## 2019-08-19 PROCEDURE — 99213 OFFICE O/P EST LOW 20 MIN: CPT | Performed by: ORTHOPAEDIC SURGERY

## 2019-08-19 NOTE — PROGRESS NOTES
Subjective:      Patient ID: Natalya Blanco is a 68 y.o. female. HPI  The patient is 1 month out from a proximal humerus fracture on the right. She reports it is feeling much better. A little bit of achiness. Current Outpatient Medications   Medication Sig Dispense Refill    clonazePAM (KLONOPIN) 0.5 MG tablet Take 1 tablet by mouth 3 times daily as needed (tremors) for up to 10 days. 30 tablet 0    ibuprofen (ADVIL;MOTRIN) 200 MG tablet Take 400 mg by mouth every 6-8 hours as needed for Pain      buPROPion (WELLBUTRIN XL) 150 MG extended release tablet Take 1 tablet by mouth 2 times daily (Patient not taking: Reported on 7/19/2019) 60 tablet 3    levothyroxine (SYNTHROID) 50 MCG tablet TAKE ONE TABLET BY MOUTH DAILY MONDAY THROUGH SATURDAY AND SKIP SUNDAY 77 tablet 0    albuterol sulfate HFA (PROVENTIL HFA) 108 (90 Base) MCG/ACT inhaler Inhale 1-2 puffs into the lungs every 4 hours as needed for Wheezing or Shortness of Breath (Space out to every 6 hours as symptoms improve) 1 Inhaler 2    Fluticasone Furoate-Vilanterol (BREO ELLIPTA) 200-25 MCG/INH AEPB Inhale 1 Inhaler into the lungs daily 28 each 5    citalopram (CELEXA) 40 MG tablet TAKE ONE TABLET BY MOUTH DAILY 30 tablet 5    pantoprazole (PROTONIX) 40 MG tablet Take 1 tablet by mouth daily 30 tablet 3    Elastic Bandages & Supports (LUMBAR BACK BRACE/SUPPORT PAD) MISC 1 each by Does not apply route daily as needed (pain) 1 each 0    denosumab (PROLIA) 60 MG/ML SOLN SC injection Inject 1 mL into the skin once for 1 dose 1 mL 0    propranolol (INDERAL) 10 MG tablet Take 1 tablet by mouth 3 times daily For tremors, tachycardia 90 tablet 3    vitamin B-12 (CYANOCOBALAMIN) 1000 MCG tablet Take 1,000 mcg by mouth daily      Vitamin D (CHOLECALCIFEROL) 1000 UNITS CAPS capsule Take 1,000 Units by mouth daily      folic acid (FOLVITE) 1 MG tablet Take 1 mg by mouth daily       No current facility-administered medications for this visit. resolving bruise. Mild swelling noted of the hand. MP extension first dorsal interosseous abductor pollicis brevis strength all 5/5.  2+ radial pulse with brisk refill. Radiology:            Impression:        Assessment:     Visit Diagnoses       Codes    Right shoulder pain, unspecified chronicity    -  Primary M25.511    Other closed nondisplaced fracture of proximal end of right humerus, initial encounter     S42.294A           Plan:     Patient will start Codman exercises. I like to see her back in about 3 weeks with another set of right shoulder films.   At that point we should be able to start formal therapy at the shoulder     Please be aware that portions of this chart note were created using voice recognition software and that unforseen errors may have occured   Electronically signed by Jorge Monreal MD on 8/19/2019 at 2:17 PM

## 2019-09-04 ENCOUNTER — TELEPHONE (OUTPATIENT)
Dept: PRIMARY CARE CLINIC | Age: 74
End: 2019-09-04

## 2019-09-04 NOTE — TELEPHONE ENCOUNTER
Discussed with [facility], She will see if her mom's pharmacy can do weekly dose paks of the clonazepam or computer michele,  because she has overused it in the past and has a large bottle of it from the neurologist.   I would like to her call the neurologist office and ask them to stop prescribing it.  I will prescribe all controlled meds from now on  I will also ask the staff to call also

## 2019-09-09 ENCOUNTER — OFFICE VISIT (OUTPATIENT)
Dept: ORTHOPEDIC SURGERY | Age: 74
End: 2019-09-09
Payer: COMMERCIAL

## 2019-09-09 VITALS
WEIGHT: 143 LBS | HEART RATE: 67 BPM | DIASTOLIC BLOOD PRESSURE: 62 MMHG | HEIGHT: 61 IN | BODY MASS INDEX: 27 KG/M2 | SYSTOLIC BLOOD PRESSURE: 90 MMHG

## 2019-09-09 DIAGNOSIS — S42.91XD CLOSED FRACTURE OF RIGHT SHOULDER WITH ROUTINE HEALING, SUBSEQUENT ENCOUNTER: Primary | ICD-10-CM

## 2019-09-09 DIAGNOSIS — S42.294A OTHER CLOSED NONDISPLACED FRACTURE OF PROXIMAL END OF RIGHT HUMERUS, INITIAL ENCOUNTER: ICD-10-CM

## 2019-09-09 DIAGNOSIS — E03.9 HYPOTHYROIDISM, UNSPECIFIED TYPE: ICD-10-CM

## 2019-09-09 DIAGNOSIS — R25.1 TREMOR: ICD-10-CM

## 2019-09-09 DIAGNOSIS — M25.511 RIGHT SHOULDER PAIN, UNSPECIFIED CHRONICITY: Primary | ICD-10-CM

## 2019-09-09 PROCEDURE — 99213 OFFICE O/P EST LOW 20 MIN: CPT | Performed by: ORTHOPAEDIC SURGERY

## 2019-09-09 RX ORDER — CITALOPRAM 40 MG/1
TABLET ORAL
Qty: 30 TABLET | Refills: 3 | Status: SHIPPED | OUTPATIENT
Start: 2019-09-09 | End: 2020-03-20 | Stop reason: SDUPTHER

## 2019-09-09 RX ORDER — PROPRANOLOL HYDROCHLORIDE 10 MG/1
10 TABLET ORAL 3 TIMES DAILY
Qty: 90 TABLET | Refills: 3 | Status: SHIPPED | OUTPATIENT
Start: 2019-09-09 | End: 2019-09-25 | Stop reason: ALTCHOICE

## 2019-09-09 RX ORDER — ALBUTEROL SULFATE 90 UG/1
1-2 AEROSOL, METERED RESPIRATORY (INHALATION) EVERY 4 HOURS PRN
Qty: 1 INHALER | Refills: 3 | Status: SHIPPED | OUTPATIENT
Start: 2019-09-09 | End: 2020-03-20 | Stop reason: SDUPTHER

## 2019-09-09 RX ORDER — FOLIC ACID 1 MG/1
1 TABLET ORAL DAILY
Qty: 30 TABLET | Refills: 3 | Status: SHIPPED | OUTPATIENT
Start: 2019-09-09

## 2019-09-09 RX ORDER — CLONAZEPAM 0.5 MG/1
0.5 TABLET ORAL 3 TIMES DAILY PRN
Qty: 30 TABLET | Refills: 0 | Status: SHIPPED | OUTPATIENT
Start: 2019-09-09 | End: 2019-11-05 | Stop reason: SDUPTHER

## 2019-09-09 RX ORDER — DOCUSATE SODIUM 100 MG/1
100 CAPSULE, LIQUID FILLED ORAL DAILY
Qty: 30 CAPSULE | Refills: 3 | Status: SHIPPED | OUTPATIENT
Start: 2019-09-09 | End: 2020-09-10

## 2019-09-09 RX ORDER — FLUTICASONE FUROATE AND VILANTEROL 200; 25 UG/1; UG/1
1 POWDER RESPIRATORY (INHALATION) DAILY
Qty: 28 EACH | Refills: 3 | Status: SHIPPED | OUTPATIENT
Start: 2019-09-09 | End: 2020-01-21 | Stop reason: SDUPTHER

## 2019-09-09 RX ORDER — PANTOPRAZOLE SODIUM 40 MG/1
40 TABLET, DELAYED RELEASE ORAL DAILY
Qty: 30 TABLET | Refills: 3 | Status: SHIPPED | OUTPATIENT
Start: 2019-09-09 | End: 2019-12-12 | Stop reason: SDUPTHER

## 2019-09-09 RX ORDER — LANOLIN ALCOHOL/MO/W.PET/CERES
1000 CREAM (GRAM) TOPICAL DAILY
Qty: 30 TABLET | Refills: 3 | Status: SHIPPED | OUTPATIENT
Start: 2019-09-09 | End: 2020-01-10

## 2019-09-09 RX ORDER — HYDROCODONE BITARTRATE AND ACETAMINOPHEN 5; 325 MG/1; MG/1
1 TABLET ORAL EVERY 4 HOURS PRN
Qty: 180 TABLET | Refills: 0 | Status: SHIPPED | OUTPATIENT
Start: 2019-09-09 | End: 2019-09-25 | Stop reason: DRUGHIGH

## 2019-09-09 RX ORDER — LEVOTHYROXINE SODIUM 0.05 MG/1
TABLET ORAL
Qty: 77 TABLET | Refills: 3 | Status: SHIPPED | OUTPATIENT
Start: 2019-09-09 | End: 2020-09-02

## 2019-09-09 NOTE — PROGRESS NOTES
Subjective:      Patient ID: Kobe Marie is a 68 y.o. female. HPI  The patient returns in follow-up for her right shoulder. She reports it is feeling significantly better. Some achiness still anterior aspect of the shoulder. Current Outpatient Medications   Medication Sig Dispense Refill    ibuprofen (ADVIL;MOTRIN) 200 MG tablet Take 400 mg by mouth every 6-8 hours as needed for Pain      levothyroxine (SYNTHROID) 50 MCG tablet TAKE ONE TABLET BY MOUTH DAILY MONDAY THROUGH SATURDAY AND SKIP SUNDAY 77 tablet 0    albuterol sulfate HFA (PROVENTIL HFA) 108 (90 Base) MCG/ACT inhaler Inhale 1-2 puffs into the lungs every 4 hours as needed for Wheezing or Shortness of Breath (Space out to every 6 hours as symptoms improve) 1 Inhaler 2    Fluticasone Furoate-Vilanterol (BREO ELLIPTA) 200-25 MCG/INH AEPB Inhale 1 Inhaler into the lungs daily 28 each 5    citalopram (CELEXA) 40 MG tablet TAKE ONE TABLET BY MOUTH DAILY 30 tablet 5    Elastic Bandages & Supports (LUMBAR BACK BRACE/SUPPORT PAD) MISC 1 each by Does not apply route daily as needed (pain) 1 each 0    vitamin B-12 (CYANOCOBALAMIN) 1000 MCG tablet Take 1,000 mcg by mouth daily      Vitamin D (CHOLECALCIFEROL) 1000 UNITS CAPS capsule Take 1,000 Units by mouth daily      folic acid (FOLVITE) 1 MG tablet Take 1 mg by mouth daily      polyvinyl alcohol-povidone (CLEAR EYES NATURAL TEARS) 5-6 MG/ML SOLN opthalmic solution 1 drop as needed for Dry Eyes      Menthol, Topical Analgesic, (BIOFREEZE) 4 % GEL Apply topically as needed      HYDROcodone-acetaminophen (NORCO) 5-325 MG per tablet Take 1 tablet by mouth every 4 hours as needed for Pain.       pantoprazole (PROTONIX) 40 MG tablet Take 40 mg by mouth daily      propranolol (INDERAL) 10 MG tablet Take 10 mg by mouth 3 times daily      docusate sodium (COLACE) 100 MG capsule Take 100 mg by mouth daily      pseudoephedrine (SUDAFED) 30 MG tablet Take 30 mg by mouth 3 times daily as needed for

## 2019-09-12 ENCOUNTER — TELEPHONE (OUTPATIENT)
Dept: PRIMARY CARE CLINIC | Age: 74
End: 2019-09-12

## 2019-09-25 ENCOUNTER — OFFICE VISIT (OUTPATIENT)
Dept: PRIMARY CARE CLINIC | Age: 74
End: 2019-09-25
Payer: COMMERCIAL

## 2019-09-25 VITALS
SYSTOLIC BLOOD PRESSURE: 96 MMHG | OXYGEN SATURATION: 96 % | DIASTOLIC BLOOD PRESSURE: 62 MMHG | HEART RATE: 67 BPM | BODY MASS INDEX: 28.64 KG/M2 | WEIGHT: 151.6 LBS

## 2019-09-25 DIAGNOSIS — S42.91XD CLOSED FRACTURE OF RIGHT SHOULDER WITH ROUTINE HEALING, SUBSEQUENT ENCOUNTER: ICD-10-CM

## 2019-09-25 DIAGNOSIS — R47.89 EPISODE OF CHANGE IN SPEECH: ICD-10-CM

## 2019-09-25 DIAGNOSIS — R41.0 CONFUSION: ICD-10-CM

## 2019-09-25 DIAGNOSIS — E03.9 HYPOTHYROIDISM, UNSPECIFIED TYPE: ICD-10-CM

## 2019-09-25 DIAGNOSIS — J44.9 CHRONIC OBSTRUCTIVE PULMONARY DISEASE, UNSPECIFIED COPD TYPE (HCC): Primary | ICD-10-CM

## 2019-09-25 PROCEDURE — 99214 OFFICE O/P EST MOD 30 MIN: CPT | Performed by: FAMILY MEDICINE

## 2019-09-25 RX ORDER — HYDROCODONE BITARTRATE AND ACETAMINOPHEN 5; 325 MG/1; MG/1
1 TABLET ORAL EVERY 6 HOURS PRN
Qty: 180 TABLET | Refills: 0 | Status: SHIPPED
Start: 2019-09-25 | End: 2019-10-31

## 2019-09-25 ASSESSMENT — ENCOUNTER SYMPTOMS
COUGH: 1
DIFFICULTY BREATHING: 0
WHEEZING: 1

## 2019-09-25 ASSESSMENT — COPD QUESTIONNAIRES: COPD: 1

## 2019-09-25 NOTE — PROGRESS NOTES
Encounter   Medications    HYDROcodone-acetaminophen (NORCO) 5-325 MG per tablet     Sig: Take 1 tablet by mouth every 6 hours as needed for up to 30 days. Dispense:  180 tablet     Refill:  0     Reduce doses taken as pain becomes manageable       Patient given educational materials - see patient instructions. Discussed use, benefit, and side effects of prescribed medications. All patient questions answered. Pt voiced understanding. Reviewed health maintenance. Instructed to continue current medications, diet  Patient agreed with treatment plan. Follow up as directed.      Electronicallysigned by Genaro Rubi MD on 9/25/2019 at 12:02 PM

## 2019-10-01 ENCOUNTER — TELEPHONE (OUTPATIENT)
Dept: PRIMARY CARE CLINIC | Age: 74
End: 2019-10-01

## 2019-10-02 ENCOUNTER — TELEPHONE (OUTPATIENT)
Dept: PRIMARY CARE CLINIC | Age: 74
End: 2019-10-02

## 2019-10-04 ENCOUNTER — HOSPITAL ENCOUNTER (OUTPATIENT)
Dept: CT IMAGING | Age: 74
Discharge: HOME | End: 2019-10-06
Payer: COMMERCIAL

## 2019-10-04 DIAGNOSIS — R41.0 CONFUSION: ICD-10-CM

## 2019-10-04 DIAGNOSIS — R47.89 EPISODE OF CHANGE IN SPEECH: ICD-10-CM

## 2019-10-04 PROCEDURE — 70450 CT HEAD/BRAIN W/O DYE: CPT

## 2019-10-07 ENCOUNTER — OFFICE VISIT (OUTPATIENT)
Dept: ORTHOPEDIC SURGERY | Age: 74
End: 2019-10-07
Payer: COMMERCIAL

## 2019-10-07 VITALS — BODY MASS INDEX: 28.51 KG/M2 | HEIGHT: 61 IN | WEIGHT: 151 LBS

## 2019-10-07 DIAGNOSIS — S42.294A OTHER CLOSED NONDISPLACED FRACTURE OF PROXIMAL END OF RIGHT HUMERUS, INITIAL ENCOUNTER: Primary | ICD-10-CM

## 2019-10-07 PROCEDURE — 99213 OFFICE O/P EST LOW 20 MIN: CPT | Performed by: ORTHOPAEDIC SURGERY

## 2019-10-09 ENCOUNTER — TELEPHONE (OUTPATIENT)
Dept: PRIMARY CARE CLINIC | Age: 74
End: 2019-10-09

## 2019-10-09 DIAGNOSIS — M25.511 RIGHT SHOULDER PAIN, UNSPECIFIED CHRONICITY: Primary | ICD-10-CM

## 2019-10-11 ENCOUNTER — OFFICE VISIT (OUTPATIENT)
Dept: PRIMARY CARE CLINIC | Age: 74
End: 2019-10-11
Payer: COMMERCIAL

## 2019-10-11 VITALS
OXYGEN SATURATION: 95 % | HEART RATE: 64 BPM | DIASTOLIC BLOOD PRESSURE: 72 MMHG | SYSTOLIC BLOOD PRESSURE: 116 MMHG | BODY MASS INDEX: 28.46 KG/M2 | WEIGHT: 150.6 LBS

## 2019-10-11 DIAGNOSIS — B35.4 TINEA CORPORIS: Primary | ICD-10-CM

## 2019-10-11 DIAGNOSIS — Z23 NEEDS FLU SHOT: ICD-10-CM

## 2019-10-11 PROCEDURE — 90653 IIV ADJUVANT VACCINE IM: CPT | Performed by: FAMILY MEDICINE

## 2019-10-11 PROCEDURE — G0008 ADMIN INFLUENZA VIRUS VAC: HCPCS | Performed by: FAMILY MEDICINE

## 2019-10-11 PROCEDURE — 99212 OFFICE O/P EST SF 10 MIN: CPT | Performed by: FAMILY MEDICINE

## 2019-10-11 RX ORDER — NYSTATIN 100000 U/G
CREAM TOPICAL
Qty: 30 G | Refills: 1 | Status: SHIPPED | OUTPATIENT
Start: 2019-10-11 | End: 2019-12-02

## 2019-10-11 RX ORDER — NYSTATIN 100000 [USP'U]/G
POWDER TOPICAL
Qty: 60 G | Refills: 1 | Status: SHIPPED | OUTPATIENT
Start: 2019-10-11 | End: 2019-12-02

## 2019-10-14 ENCOUNTER — TELEPHONE (OUTPATIENT)
Dept: PRIMARY CARE CLINIC | Age: 74
End: 2019-10-14

## 2019-10-15 ENCOUNTER — NURSE ONLY (OUTPATIENT)
Dept: PRIMARY CARE CLINIC | Age: 74
End: 2019-10-15
Payer: COMMERCIAL

## 2019-10-15 DIAGNOSIS — M81.0 MENOPAUSAL OSTEOPOROSIS: Primary | Chronic | ICD-10-CM

## 2019-10-15 PROCEDURE — 96372 THER/PROPH/DIAG INJ SC/IM: CPT | Performed by: FAMILY MEDICINE

## 2019-10-23 ENCOUNTER — HOSPITAL ENCOUNTER (OUTPATIENT)
Age: 74
Setting detail: SPECIMEN
Discharge: HOME | End: 2019-10-23
Payer: COMMERCIAL

## 2019-10-23 ENCOUNTER — OFFICE VISIT (OUTPATIENT)
Dept: PRIMARY CARE CLINIC | Age: 74
End: 2019-10-23
Payer: COMMERCIAL

## 2019-10-23 VITALS
TEMPERATURE: 97.8 F | DIASTOLIC BLOOD PRESSURE: 76 MMHG | OXYGEN SATURATION: 94 % | SYSTOLIC BLOOD PRESSURE: 116 MMHG | WEIGHT: 147.6 LBS | HEART RATE: 74 BPM | BODY MASS INDEX: 27.89 KG/M2

## 2019-10-23 DIAGNOSIS — B37.0 THRUSH: ICD-10-CM

## 2019-10-23 DIAGNOSIS — N30.01 ACUTE CYSTITIS WITH HEMATURIA: Primary | ICD-10-CM

## 2019-10-23 DIAGNOSIS — N30.01 ACUTE CYSTITIS WITH HEMATURIA: ICD-10-CM

## 2019-10-23 DIAGNOSIS — Z12.11 SCREENING FOR COLON CANCER: ICD-10-CM

## 2019-10-23 DIAGNOSIS — Z86.010 HISTORY OF COLON POLYPS: ICD-10-CM

## 2019-10-23 DIAGNOSIS — J44.9 CHRONIC OBSTRUCTIVE PULMONARY DISEASE, UNSPECIFIED COPD TYPE (HCC): ICD-10-CM

## 2019-10-23 DIAGNOSIS — J20.9 ACUTE BRONCHITIS, UNSPECIFIED ORGANISM: ICD-10-CM

## 2019-10-23 LAB
BILIRUBIN, POC: ABNORMAL
BLOOD URINE, POC: ABNORMAL
CLARITY, POC: ABNORMAL
COLOR, POC: ABNORMAL
GLUCOSE URINE, POC: ABNORMAL
KETONES, POC: ABNORMAL
LEUKOCYTE EST, POC: ABNORMAL
NITRITE, POC: ABNORMAL
PH, POC: 6
PROTEIN, POC: ABNORMAL
SPECIFIC GRAVITY, POC: 1.01
UROBILINOGEN, POC: 0.2

## 2019-10-23 PROCEDURE — 99214 OFFICE O/P EST MOD 30 MIN: CPT | Performed by: PHYSICIAN ASSISTANT

## 2019-10-23 PROCEDURE — 81003 URINALYSIS AUTO W/O SCOPE: CPT | Performed by: PHYSICIAN ASSISTANT

## 2019-10-23 RX ORDER — PREDNISONE 20 MG/1
40 TABLET ORAL DAILY
Qty: 10 TABLET | Refills: 0 | Status: SHIPPED | OUTPATIENT
Start: 2019-10-23 | End: 2019-10-28

## 2019-10-23 RX ORDER — CIPROFLOXACIN 500 MG/1
500 TABLET, FILM COATED ORAL 2 TIMES DAILY
Qty: 14 TABLET | Refills: 0 | Status: SHIPPED | OUTPATIENT
Start: 2019-10-23 | End: 2019-10-30

## 2019-10-23 ASSESSMENT — ENCOUNTER SYMPTOMS
COUGH: 1
SHORTNESS OF BREATH: 1
WHEEZING: 1

## 2019-10-24 ASSESSMENT — ENCOUNTER SYMPTOMS
RHINORRHEA: 1
SORE THROAT: 1
SINUS PRESSURE: 1
BLOOD IN STOOL: 0
NAUSEA: 0
VOMITING: 0

## 2019-10-25 LAB
CULTURE: ABNORMAL
Lab: ABNORMAL
SPECIMEN DESCRIPTION: ABNORMAL

## 2019-10-29 ENCOUNTER — TELEPHONE (OUTPATIENT)
Dept: GASTROENTEROLOGY | Age: 74
End: 2019-10-29

## 2019-10-30 ENCOUNTER — TELEPHONE (OUTPATIENT)
Dept: PRIMARY CARE CLINIC | Age: 74
End: 2019-10-30

## 2019-10-31 DIAGNOSIS — M81.0 MENOPAUSAL OSTEOPOROSIS: Primary | ICD-10-CM

## 2019-10-31 DIAGNOSIS — G89.29 OTHER CHRONIC PAIN: ICD-10-CM

## 2019-10-31 DIAGNOSIS — S42.91XD CLOSED FRACTURE OF RIGHT SHOULDER WITH ROUTINE HEALING, SUBSEQUENT ENCOUNTER: ICD-10-CM

## 2019-10-31 RX ORDER — HYDROCODONE BITARTRATE AND ACETAMINOPHEN 5; 325 MG/1; MG/1
1 TABLET ORAL
Qty: 150 TABLET | Refills: 0 | Status: SHIPPED | OUTPATIENT
Start: 2019-10-31 | End: 2019-11-30

## 2019-11-04 ENCOUNTER — OFFICE VISIT (OUTPATIENT)
Dept: ORTHOPEDIC SURGERY | Age: 74
End: 2019-11-04
Payer: COMMERCIAL

## 2019-11-04 ENCOUNTER — TELEPHONE (OUTPATIENT)
Dept: ORTHOPEDIC SURGERY | Age: 74
End: 2019-11-04

## 2019-11-04 VITALS — HEIGHT: 61 IN | WEIGHT: 147.71 LBS | BODY MASS INDEX: 27.89 KG/M2

## 2019-11-04 DIAGNOSIS — S42.294A OTHER CLOSED NONDISPLACED FRACTURE OF PROXIMAL END OF RIGHT HUMERUS, INITIAL ENCOUNTER: Primary | ICD-10-CM

## 2019-11-04 PROCEDURE — 99213 OFFICE O/P EST LOW 20 MIN: CPT | Performed by: ORTHOPAEDIC SURGERY

## 2019-11-05 DIAGNOSIS — R25.1 TREMOR: ICD-10-CM

## 2019-11-05 RX ORDER — CLONAZEPAM 0.5 MG/1
0.5 TABLET ORAL 3 TIMES DAILY PRN
Qty: 30 TABLET | Refills: 0 | Status: SHIPPED | OUTPATIENT
Start: 2019-11-05 | End: 2019-11-21 | Stop reason: SDUPTHER

## 2019-11-21 DIAGNOSIS — R25.1 TREMOR: ICD-10-CM

## 2019-11-21 RX ORDER — CLONAZEPAM 0.5 MG/1
0.5 TABLET ORAL 3 TIMES DAILY PRN
Qty: 30 TABLET | Refills: 0 | Status: SHIPPED | OUTPATIENT
Start: 2019-11-21 | End: 2019-11-26

## 2019-11-26 ENCOUNTER — TELEPHONE (OUTPATIENT)
Dept: PRIMARY CARE CLINIC | Age: 74
End: 2019-11-26

## 2019-11-26 DIAGNOSIS — R25.1 TREMOR: ICD-10-CM

## 2019-11-26 RX ORDER — CLONAZEPAM 0.5 MG/1
0.5 TABLET ORAL DAILY PRN
Qty: 30 TABLET | Refills: 0 | Status: ON HOLD | OUTPATIENT
Start: 2019-11-26 | End: 2019-12-03 | Stop reason: SDUPTHER

## 2019-12-02 ENCOUNTER — TELEPHONE (OUTPATIENT)
Dept: PRIMARY CARE CLINIC | Age: 74
End: 2019-12-02

## 2019-12-02 ENCOUNTER — HOSPITAL ENCOUNTER (OUTPATIENT)
Age: 74
Setting detail: OBSERVATION
Discharge: HOME | End: 2019-12-03
Attending: EMERGENCY MEDICINE | Admitting: FAMILY MEDICINE
Payer: COMMERCIAL

## 2019-12-02 ENCOUNTER — APPOINTMENT (OUTPATIENT)
Dept: GENERAL RADIOLOGY | Age: 74
End: 2019-12-02
Payer: COMMERCIAL

## 2019-12-02 DIAGNOSIS — S42.91XD CLOSED FRACTURE OF RIGHT SHOULDER WITH ROUTINE HEALING, SUBSEQUENT ENCOUNTER: ICD-10-CM

## 2019-12-02 DIAGNOSIS — G89.29 OTHER CHRONIC PAIN: ICD-10-CM

## 2019-12-02 DIAGNOSIS — R25.1 TREMOR: ICD-10-CM

## 2019-12-02 DIAGNOSIS — R07.9 CHEST PAIN, UNSPECIFIED TYPE: Primary | ICD-10-CM

## 2019-12-02 DIAGNOSIS — M19.90 ARTHRITIS: ICD-10-CM

## 2019-12-02 LAB
-: NORMAL
ABSOLUTE EOS #: 0.1 K/UL (ref 0–0.4)
ABSOLUTE IMMATURE GRANULOCYTE: ABNORMAL K/UL (ref 0–0.3)
ABSOLUTE LYMPH #: 2.4 K/UL (ref 1–4.8)
ABSOLUTE MONO #: 0.7 K/UL (ref 0.1–1.3)
ALBUMIN SERPL-MCNC: 4.4 G/DL (ref 3.5–5.2)
ALBUMIN/GLOBULIN RATIO: ABNORMAL (ref 1–2.5)
ALP BLD-CCNC: 57 U/L (ref 35–104)
ALT SERPL-CCNC: 7 U/L (ref 5–33)
ANION GAP SERPL CALCULATED.3IONS-SCNC: 15 MMOL/L (ref 9–17)
AST SERPL-CCNC: 14 U/L
BASOPHILS # BLD: 1 % (ref 0–2)
BASOPHILS ABSOLUTE: 0.1 K/UL (ref 0–0.2)
BILIRUB SERPL-MCNC: 0.46 MG/DL (ref 0.3–1.2)
BUN BLDV-MCNC: 10 MG/DL (ref 8–23)
BUN/CREAT BLD: ABNORMAL (ref 9–20)
CALCIUM SERPL-MCNC: 9.3 MG/DL (ref 8.6–10.4)
CHLORIDE BLD-SCNC: 94 MMOL/L (ref 98–107)
CO2: 24 MMOL/L (ref 20–31)
CREAT SERPL-MCNC: 0.58 MG/DL (ref 0.5–0.9)
D-DIMER QUANTITATIVE: 0.47 MG/L FEU (ref 0–0.59)
DIFFERENTIAL TYPE: ABNORMAL
EOSINOPHILS RELATIVE PERCENT: 2 % (ref 0–4)
GFR AFRICAN AMERICAN: >60 ML/MIN
GFR NON-AFRICAN AMERICAN: >60 ML/MIN
GFR SERPL CREATININE-BSD FRML MDRD: ABNORMAL ML/MIN/{1.73_M2}
GFR SERPL CREATININE-BSD FRML MDRD: ABNORMAL ML/MIN/{1.73_M2}
GLUCOSE BLD-MCNC: 97 MG/DL (ref 70–99)
HCT VFR BLD CALC: 37.6 % (ref 36–46)
HEMOGLOBIN: 12.6 G/DL (ref 12–16)
IMMATURE GRANULOCYTES: ABNORMAL %
LYMPHOCYTES # BLD: 34 % (ref 24–44)
MCH RBC QN AUTO: 31.9 PG (ref 26–34)
MCHC RBC AUTO-ENTMCNC: 33.5 G/DL (ref 31–37)
MCV RBC AUTO: 95.1 FL (ref 80–100)
MONOCYTES # BLD: 10 % (ref 1–7)
NRBC AUTOMATED: ABNORMAL PER 100 WBC
PDW BLD-RTO: 14.7 % (ref 11.5–14.9)
PLATELET # BLD: 325 K/UL (ref 150–450)
PLATELET ESTIMATE: ABNORMAL
PMV BLD AUTO: 6.2 FL (ref 6–12)
POTASSIUM SERPL-SCNC: 4.7 MMOL/L (ref 3.7–5.3)
RBC # BLD: 3.96 M/UL (ref 4–5.2)
RBC # BLD: ABNORMAL 10*6/UL
REASON FOR REJECTION: NORMAL
SEG NEUTROPHILS: 53 % (ref 36–66)
SEGMENTED NEUTROPHILS ABSOLUTE COUNT: 3.6 K/UL (ref 1.3–9.1)
SODIUM BLD-SCNC: 133 MMOL/L (ref 135–144)
TOTAL PROTEIN: 7 G/DL (ref 6.4–8.3)
TROPONIN INTERP: ABNORMAL
TROPONIN T: ABNORMAL NG/ML
TROPONIN, HIGH SENSITIVITY: 20 NG/L (ref 0–14)
TROPONIN, HIGH SENSITIVITY: 21 NG/L (ref 0–14)
TROPONIN, HIGH SENSITIVITY: 24 NG/L (ref 0–14)
WBC # BLD: 6.9 K/UL (ref 3.5–11)
WBC # BLD: ABNORMAL 10*3/UL
ZZ NTE CLEAN UP: ORDERED TEST: NORMAL
ZZ NTE WITH NAME CLEAN UP: SPECIMEN SOURCE: NORMAL

## 2019-12-02 PROCEDURE — 80053 COMPREHEN METABOLIC PANEL: CPT

## 2019-12-02 PROCEDURE — 6360000002 HC RX W HCPCS: Performed by: FAMILY MEDICINE

## 2019-12-02 PROCEDURE — 2580000003 HC RX 258: Performed by: FAMILY MEDICINE

## 2019-12-02 PROCEDURE — 84484 ASSAY OF TROPONIN QUANT: CPT

## 2019-12-02 PROCEDURE — 85025 COMPLETE CBC W/AUTO DIFF WBC: CPT

## 2019-12-02 PROCEDURE — 99285 EMERGENCY DEPT VISIT HI MDM: CPT

## 2019-12-02 PROCEDURE — 36415 COLL VENOUS BLD VENIPUNCTURE: CPT

## 2019-12-02 PROCEDURE — 6370000000 HC RX 637 (ALT 250 FOR IP): Performed by: FAMILY MEDICINE

## 2019-12-02 PROCEDURE — 71046 X-RAY EXAM CHEST 2 VIEWS: CPT

## 2019-12-02 PROCEDURE — G0378 HOSPITAL OBSERVATION PER HR: HCPCS

## 2019-12-02 PROCEDURE — 96372 THER/PROPH/DIAG INJ SC/IM: CPT

## 2019-12-02 PROCEDURE — 6370000000 HC RX 637 (ALT 250 FOR IP): Performed by: EMERGENCY MEDICINE

## 2019-12-02 PROCEDURE — 85379 FIBRIN DEGRADATION QUANT: CPT

## 2019-12-02 PROCEDURE — 93005 ELECTROCARDIOGRAM TRACING: CPT | Performed by: EMERGENCY MEDICINE

## 2019-12-02 RX ORDER — DOCUSATE SODIUM 100 MG/1
100 CAPSULE, LIQUID FILLED ORAL DAILY
Status: DISCONTINUED | OUTPATIENT
Start: 2019-12-02 | End: 2019-12-03 | Stop reason: HOSPADM

## 2019-12-02 RX ORDER — ATROPINE SULFATE 0.1 MG/ML
0.5 INJECTION INTRAVENOUS EVERY 5 MIN PRN
Status: ACTIVE | OUTPATIENT
Start: 2019-12-02 | End: 2019-12-02

## 2019-12-02 RX ORDER — ACETAMINOPHEN 325 MG/1
650 TABLET ORAL EVERY 4 HOURS PRN
Status: DISCONTINUED | OUTPATIENT
Start: 2019-12-02 | End: 2019-12-03 | Stop reason: HOSPADM

## 2019-12-02 RX ORDER — PANTOPRAZOLE SODIUM 40 MG/1
40 TABLET, DELAYED RELEASE ORAL DAILY
Status: DISCONTINUED | OUTPATIENT
Start: 2019-12-02 | End: 2019-12-03 | Stop reason: HOSPADM

## 2019-12-02 RX ORDER — NITROGLYCERIN 0.4 MG/1
0.4 TABLET SUBLINGUAL EVERY 5 MIN PRN
Status: ACTIVE | OUTPATIENT
Start: 2019-12-02 | End: 2019-12-02

## 2019-12-02 RX ORDER — CITALOPRAM 40 MG/1
1 TABLET ORAL DAILY
Status: DISCONTINUED | OUTPATIENT
Start: 2019-12-02 | End: 2019-12-02

## 2019-12-02 RX ORDER — SODIUM CHLORIDE 9 MG/ML
500 INJECTION, SOLUTION INTRAVENOUS CONTINUOUS PRN
Status: ACTIVE | OUTPATIENT
Start: 2019-12-02 | End: 2019-12-02

## 2019-12-02 RX ORDER — SODIUM CHLORIDE 0.9 % (FLUSH) 0.9 %
10 SYRINGE (ML) INJECTION PRN
Status: DISCONTINUED | OUTPATIENT
Start: 2019-12-02 | End: 2019-12-03 | Stop reason: HOSPADM

## 2019-12-02 RX ORDER — ALBUTEROL SULFATE 90 UG/1
2 AEROSOL, METERED RESPIRATORY (INHALATION) EVERY 4 HOURS PRN
Status: DISCONTINUED | OUTPATIENT
Start: 2019-12-02 | End: 2019-12-03 | Stop reason: HOSPADM

## 2019-12-02 RX ORDER — ASPIRIN 81 MG/1
324 TABLET, CHEWABLE ORAL ONCE
Status: COMPLETED | OUTPATIENT
Start: 2019-12-02 | End: 2019-12-02

## 2019-12-02 RX ORDER — SODIUM CHLORIDE 0.9 % (FLUSH) 0.9 %
10 SYRINGE (ML) INJECTION PRN
Status: ACTIVE | OUTPATIENT
Start: 2019-12-02 | End: 2019-12-02

## 2019-12-02 RX ORDER — CITALOPRAM 40 MG/1
40 TABLET ORAL DAILY
Status: DISCONTINUED | OUTPATIENT
Start: 2019-12-02 | End: 2019-12-03 | Stop reason: HOSPADM

## 2019-12-02 RX ORDER — NITROGLYCERIN 0.4 MG/1
0.4 TABLET SUBLINGUAL EVERY 5 MIN PRN
Status: DISCONTINUED | OUTPATIENT
Start: 2019-12-02 | End: 2019-12-03 | Stop reason: HOSPADM

## 2019-12-02 RX ORDER — LEVOTHYROXINE SODIUM 0.05 MG/1
50 TABLET ORAL DAILY
Status: DISCONTINUED | OUTPATIENT
Start: 2019-12-02 | End: 2019-12-03 | Stop reason: HOSPADM

## 2019-12-02 RX ORDER — FLUTICASONE FUROATE AND VILANTEROL 200; 25 UG/1; UG/1
1 POWDER RESPIRATORY (INHALATION) DAILY
Status: DISCONTINUED | OUTPATIENT
Start: 2019-12-02 | End: 2019-12-03 | Stop reason: HOSPADM

## 2019-12-02 RX ORDER — DOBUTAMINE HYDROCHLORIDE 400 MG/100ML
10 INJECTION INTRAVENOUS CONTINUOUS
Status: DISCONTINUED | OUTPATIENT
Start: 2019-12-02 | End: 2019-12-03 | Stop reason: HOSPADM

## 2019-12-02 RX ORDER — SODIUM CHLORIDE 0.9 % (FLUSH) 0.9 %
10 SYRINGE (ML) INJECTION EVERY 12 HOURS SCHEDULED
Status: DISCONTINUED | OUTPATIENT
Start: 2019-12-02 | End: 2019-12-03 | Stop reason: HOSPADM

## 2019-12-02 RX ORDER — HYDROCODONE BITARTRATE AND ACETAMINOPHEN 5; 325 MG/1; MG/1
1 TABLET ORAL ONCE
Status: COMPLETED | OUTPATIENT
Start: 2019-12-02 | End: 2019-12-02

## 2019-12-02 RX ORDER — CLONAZEPAM 0.5 MG/1
0.5 TABLET ORAL DAILY PRN
Status: DISCONTINUED | OUTPATIENT
Start: 2019-12-02 | End: 2019-12-03 | Stop reason: HOSPADM

## 2019-12-02 RX ORDER — METOPROLOL TARTRATE 5 MG/5ML
5 INJECTION INTRAVENOUS EVERY 5 MIN PRN
Status: ACTIVE | OUTPATIENT
Start: 2019-12-02 | End: 2019-12-02

## 2019-12-02 RX ORDER — HYDROCODONE BITARTRATE AND ACETAMINOPHEN 5; 325 MG/1; MG/1
1 TABLET ORAL EVERY 6 HOURS PRN
Status: ON HOLD | COMMUNITY
End: 2019-12-02 | Stop reason: ALTCHOICE

## 2019-12-02 RX ORDER — FOLIC ACID 1 MG/1
1 TABLET ORAL DAILY
Status: DISCONTINUED | OUTPATIENT
Start: 2019-12-02 | End: 2019-12-03 | Stop reason: HOSPADM

## 2019-12-02 RX ORDER — ALBUTEROL SULFATE 90 UG/1
2 AEROSOL, METERED RESPIRATORY (INHALATION) PRN
Status: ACTIVE | OUTPATIENT
Start: 2019-12-02 | End: 2019-12-02

## 2019-12-02 RX ADMIN — ASPIRIN 81 MG CHEWABLE TABLET 324 MG: 81 TABLET CHEWABLE at 13:04

## 2019-12-02 RX ADMIN — Medication 10 ML: at 22:03

## 2019-12-02 RX ADMIN — HYDROCODONE BITARTRATE AND ACETAMINOPHEN 1 TABLET: 5; 325 TABLET ORAL at 13:03

## 2019-12-02 RX ADMIN — ENOXAPARIN SODIUM 40 MG: 40 INJECTION SUBCUTANEOUS at 14:30

## 2019-12-02 RX ADMIN — ACETAMINOPHEN 650 MG: 325 TABLET, FILM COATED ORAL at 16:40

## 2019-12-02 RX ADMIN — CLONAZEPAM 0.5 MG: 0.5 TABLET ORAL at 17:01

## 2019-12-02 RX ADMIN — FLUTICASONE FUROATE AND VILANTEROL 1 PUFF: 200; 25 POWDER RESPIRATORY (INHALATION) at 17:04

## 2019-12-02 ASSESSMENT — PAIN DESCRIPTION - LOCATION
LOCATION: SHOULDER
LOCATION: HEAD

## 2019-12-02 ASSESSMENT — PAIN SCALES - GENERAL
PAINLEVEL_OUTOF10: 5
PAINLEVEL_OUTOF10: 9
PAINLEVEL_OUTOF10: 6
PAINLEVEL_OUTOF10: 3

## 2019-12-02 ASSESSMENT — PAIN DESCRIPTION - FREQUENCY
FREQUENCY: CONTINUOUS
FREQUENCY: INTERMITTENT

## 2019-12-02 ASSESSMENT — ENCOUNTER SYMPTOMS
SHORTNESS OF BREATH: 1
VOMITING: 0
ABDOMINAL PAIN: 0
WHEEZING: 1
BACK PAIN: 0
NAUSEA: 0
COUGH: 0
STRIDOR: 0

## 2019-12-02 ASSESSMENT — PAIN DESCRIPTION - ORIENTATION: ORIENTATION: RIGHT

## 2019-12-02 ASSESSMENT — PAIN DESCRIPTION - DESCRIPTORS
DESCRIPTORS: ACHING
DESCRIPTORS: ACHING

## 2019-12-02 ASSESSMENT — PAIN DESCRIPTION - PAIN TYPE
TYPE: ACUTE PAIN
TYPE: CHRONIC PAIN

## 2019-12-03 ENCOUNTER — APPOINTMENT (OUTPATIENT)
Dept: NUCLEAR MEDICINE | Age: 74
End: 2019-12-03
Payer: COMMERCIAL

## 2019-12-03 VITALS
BODY MASS INDEX: 26.72 KG/M2 | OXYGEN SATURATION: 97 % | WEIGHT: 141.54 LBS | DIASTOLIC BLOOD PRESSURE: 73 MMHG | HEART RATE: 74 BPM | HEIGHT: 61 IN | SYSTOLIC BLOOD PRESSURE: 145 MMHG | RESPIRATION RATE: 16 BRPM | TEMPERATURE: 98.1 F

## 2019-12-03 LAB
EKG ATRIAL RATE: 71 BPM
EKG P AXIS: 67 DEGREES
EKG P-R INTERVAL: 166 MS
EKG Q-T INTERVAL: 410 MS
EKG QRS DURATION: 70 MS
EKG QTC CALCULATION (BAZETT): 445 MS
EKG R AXIS: 32 DEGREES
EKG T AXIS: 70 DEGREES
EKG VENTRICULAR RATE: 71 BPM
LV EF: 70 %
LVEF MODALITY: NORMAL

## 2019-12-03 PROCEDURE — 2580000003 HC RX 258: Performed by: FAMILY MEDICINE

## 2019-12-03 PROCEDURE — 93010 ELECTROCARDIOGRAM REPORT: CPT | Performed by: INTERNAL MEDICINE

## 2019-12-03 PROCEDURE — 6360000002 HC RX W HCPCS: Performed by: FAMILY MEDICINE

## 2019-12-03 PROCEDURE — 78452 HT MUSCLE IMAGE SPECT MULT: CPT

## 2019-12-03 PROCEDURE — A9500 TC99M SESTAMIBI: HCPCS | Performed by: FAMILY MEDICINE

## 2019-12-03 PROCEDURE — G0378 HOSPITAL OBSERVATION PER HR: HCPCS

## 2019-12-03 PROCEDURE — 3430000000 HC RX DIAGNOSTIC RADIOPHARMACEUTICAL: Performed by: FAMILY MEDICINE

## 2019-12-03 PROCEDURE — 93017 CV STRESS TEST TRACING ONLY: CPT

## 2019-12-03 PROCEDURE — 6370000000 HC RX 637 (ALT 250 FOR IP): Performed by: FAMILY MEDICINE

## 2019-12-03 PROCEDURE — 99235 HOSP IP/OBS SAME DATE MOD 70: CPT | Performed by: FAMILY MEDICINE

## 2019-12-03 RX ORDER — AMINOPHYLLINE DIHYDRATE 25 MG/ML
50 INJECTION, SOLUTION INTRAVENOUS PRN
Status: ACTIVE | OUTPATIENT
Start: 2019-12-03 | End: 2019-12-03

## 2019-12-03 RX ORDER — SODIUM CHLORIDE 0.9 % (FLUSH) 0.9 %
10 SYRINGE (ML) INJECTION PRN
Status: DISCONTINUED | OUTPATIENT
Start: 2019-12-03 | End: 2019-12-03 | Stop reason: HOSPADM

## 2019-12-03 RX ORDER — NITROGLYCERIN 0.4 MG/1
0.4 TABLET SUBLINGUAL EVERY 5 MIN PRN
Status: ACTIVE | OUTPATIENT
Start: 2019-12-03 | End: 2019-12-03

## 2019-12-03 RX ORDER — HYDROCODONE BITARTRATE AND ACETAMINOPHEN 5; 325 MG/1; MG/1
1 TABLET ORAL EVERY 8 HOURS PRN
Qty: 9 TABLET | Refills: 0
Start: 2019-12-03 | End: 2019-12-06 | Stop reason: SDUPTHER

## 2019-12-03 RX ORDER — HYDROCODONE BITARTRATE AND ACETAMINOPHEN 5; 325 MG/1; MG/1
1 TABLET ORAL EVERY 8 HOURS PRN
Status: DISCONTINUED | OUTPATIENT
Start: 2019-12-03 | End: 2019-12-03 | Stop reason: HOSPADM

## 2019-12-03 RX ORDER — PSEUDOEPHEDRINE HYDROCHLORIDE 30 MG/1
30 TABLET ORAL EVERY 6 HOURS PRN
Refills: 1 | COMMUNITY
Start: 2019-12-03 | End: 2019-12-10

## 2019-12-03 RX ORDER — ATROPINE SULFATE 0.1 MG/ML
0.5 INJECTION INTRAVENOUS EVERY 5 MIN PRN
Status: ACTIVE | OUTPATIENT
Start: 2019-12-03 | End: 2019-12-03

## 2019-12-03 RX ORDER — GUAIFENESIN 600 MG/1
600 TABLET, EXTENDED RELEASE ORAL 2 TIMES DAILY
Status: DISCONTINUED | OUTPATIENT
Start: 2019-12-03 | End: 2019-12-03 | Stop reason: HOSPADM

## 2019-12-03 RX ORDER — CLONAZEPAM 0.5 MG/1
0.5 TABLET ORAL 3 TIMES DAILY PRN
Qty: 30 TABLET | Refills: 0
Start: 2019-12-03 | End: 2019-12-06 | Stop reason: SDUPTHER

## 2019-12-03 RX ORDER — PSEUDOEPHEDRINE HYDROCHLORIDE 30 MG/1
30 TABLET ORAL EVERY 4 HOURS PRN
Status: DISCONTINUED | OUTPATIENT
Start: 2019-12-03 | End: 2019-12-03 | Stop reason: HOSPADM

## 2019-12-03 RX ORDER — SODIUM CHLORIDE 0.9 % (FLUSH) 0.9 %
10 SYRINGE (ML) INJECTION PRN
Status: ACTIVE | OUTPATIENT
Start: 2019-12-03 | End: 2019-12-03

## 2019-12-03 RX ORDER — METOPROLOL TARTRATE 5 MG/5ML
5 INJECTION INTRAVENOUS EVERY 5 MIN PRN
Status: ACTIVE | OUTPATIENT
Start: 2019-12-03 | End: 2019-12-03

## 2019-12-03 RX ORDER — ALBUTEROL SULFATE 90 UG/1
2 AEROSOL, METERED RESPIRATORY (INHALATION) PRN
Status: ACTIVE | OUTPATIENT
Start: 2019-12-03 | End: 2019-12-03

## 2019-12-03 RX ORDER — GUAIFENESIN 600 MG/1
600 TABLET, EXTENDED RELEASE ORAL 2 TIMES DAILY
COMMUNITY
Start: 2019-12-03 | End: 2022-02-18 | Stop reason: SDUPTHER

## 2019-12-03 RX ORDER — SODIUM CHLORIDE 9 MG/ML
500 INJECTION, SOLUTION INTRAVENOUS CONTINUOUS PRN
Status: ACTIVE | OUTPATIENT
Start: 2019-12-03 | End: 2019-12-03

## 2019-12-03 RX ADMIN — Medication 10 ML: at 07:22

## 2019-12-03 RX ADMIN — PANTOPRAZOLE SODIUM 40 MG: 40 TABLET, DELAYED RELEASE ORAL at 10:14

## 2019-12-03 RX ADMIN — LEVOTHYROXINE SODIUM 50 MCG: 0.05 TABLET ORAL at 10:13

## 2019-12-03 RX ADMIN — CITALOPRAM 40 MG: 40 TABLET ORAL at 10:14

## 2019-12-03 RX ADMIN — TETRAKIS(2-METHOXYISOBUTYLISOCYANIDE)COPPER(I) TETRAFLUOROBORATE 36.8 MILLICURIE: 1 INJECTION, POWDER, LYOPHILIZED, FOR SOLUTION INTRAVENOUS at 09:25

## 2019-12-03 RX ADMIN — TETRAKIS(2-METHOXYISOBUTYLISOCYANIDE)COPPER(I) TETRAFLUOROBORATE 11.5 MILLICURIE: 1 INJECTION, POWDER, LYOPHILIZED, FOR SOLUTION INTRAVENOUS at 07:14

## 2019-12-03 RX ADMIN — Medication 10 ML: at 09:24

## 2019-12-03 RX ADMIN — FLUTICASONE FUROATE AND VILANTEROL 1 PUFF: 200; 25 POWDER RESPIRATORY (INHALATION) at 10:13

## 2019-12-03 RX ADMIN — Medication 10 ML: at 09:06

## 2019-12-03 RX ADMIN — Medication 10 ML: at 07:14

## 2019-12-03 RX ADMIN — REGADENOSON 0.4 MG: 0.08 INJECTION, SOLUTION INTRAVENOUS at 09:24

## 2019-12-03 RX ADMIN — GUAIFENESIN 600 MG: 600 TABLET, EXTENDED RELEASE ORAL at 10:19

## 2019-12-03 RX ADMIN — ALBUTEROL SULFATE 2 PUFF: 90 AEROSOL, METERED RESPIRATORY (INHALATION) at 07:22

## 2019-12-03 RX ADMIN — DOCUSATE SODIUM 100 MG: 100 CAPSULE, LIQUID FILLED ORAL at 10:14

## 2019-12-03 ASSESSMENT — ENCOUNTER SYMPTOMS
COUGH: 1
WHEEZING: 1
BACK PAIN: 1
CONSTIPATION: 0

## 2019-12-04 ENCOUNTER — TELEPHONE (OUTPATIENT)
Dept: PRIMARY CARE CLINIC | Age: 74
End: 2019-12-04

## 2019-12-06 RX ORDER — HYDROCODONE BITARTRATE AND ACETAMINOPHEN 5; 325 MG/1; MG/1
1 TABLET ORAL EVERY 6 HOURS PRN
Qty: 120 TABLET | Refills: 0 | Status: SHIPPED | OUTPATIENT
Start: 2019-12-06 | End: 2019-12-27 | Stop reason: SDUPTHER

## 2019-12-06 RX ORDER — CLONAZEPAM 0.5 MG/1
0.5 TABLET ORAL 3 TIMES DAILY PRN
Qty: 60 TABLET | Refills: 0 | Status: SHIPPED | OUTPATIENT
Start: 2019-12-06 | End: 2019-12-27 | Stop reason: SDUPTHER

## 2019-12-06 NOTE — TELEPHONE ENCOUNTER
Last OV 10/23/19. Last filled 12/3/19. [facility] listed. Clau Sharma from [facility] asking if you can increase the quantity? The Clonazepam is only 10 day supply. Pt does have appt with neuro soon.

## 2019-12-12 RX ORDER — PANTOPRAZOLE SODIUM 40 MG/1
TABLET, DELAYED RELEASE ORAL
Qty: 90 TABLET | Refills: 2 | Status: SHIPPED | OUTPATIENT
Start: 2019-12-12 | End: 2020-07-01

## 2019-12-27 ENCOUNTER — OFFICE VISIT (OUTPATIENT)
Dept: PRIMARY CARE CLINIC | Age: 74
End: 2019-12-27
Payer: COMMERCIAL

## 2019-12-27 VITALS
BODY MASS INDEX: 29.06 KG/M2 | HEART RATE: 73 BPM | SYSTOLIC BLOOD PRESSURE: 110 MMHG | WEIGHT: 153.8 LBS | DIASTOLIC BLOOD PRESSURE: 70 MMHG | OXYGEN SATURATION: 97 %

## 2019-12-27 DIAGNOSIS — M19.90 ARTHRITIS: Primary | ICD-10-CM

## 2019-12-27 DIAGNOSIS — S32.000S COMPRESSION FRACTURE OF LUMBAR VERTEBRA, UNSPECIFIED LUMBAR VERTEBRAL LEVEL, SEQUELA: ICD-10-CM

## 2019-12-27 DIAGNOSIS — S42.91XD CLOSED FRACTURE OF RIGHT SHOULDER WITH ROUTINE HEALING, SUBSEQUENT ENCOUNTER: ICD-10-CM

## 2019-12-27 DIAGNOSIS — R25.1 TREMOR: ICD-10-CM

## 2019-12-27 DIAGNOSIS — G89.29 OTHER CHRONIC PAIN: ICD-10-CM

## 2019-12-27 DIAGNOSIS — M62.81 MUSCLE WEAKNESS: ICD-10-CM

## 2019-12-27 DIAGNOSIS — R29.898 BILATERAL LEG WEAKNESS: ICD-10-CM

## 2019-12-27 DIAGNOSIS — J44.9 CHRONIC OBSTRUCTIVE PULMONARY DISEASE, UNSPECIFIED COPD TYPE (HCC): ICD-10-CM

## 2019-12-27 PROCEDURE — 99214 OFFICE O/P EST MOD 30 MIN: CPT | Performed by: FAMILY MEDICINE

## 2019-12-27 RX ORDER — MELOXICAM 15 MG/1
15 TABLET ORAL DAILY
Qty: 30 TABLET | Refills: 3 | Status: SHIPPED | OUTPATIENT
Start: 2019-12-27 | End: 2020-04-15 | Stop reason: SDUPTHER

## 2019-12-27 RX ORDER — HYDROCODONE BITARTRATE AND ACETAMINOPHEN 5; 325 MG/1; MG/1
1 TABLET ORAL 2 TIMES DAILY
Qty: 120 TABLET | Refills: 0
Start: 2019-12-27 | End: 2020-01-21 | Stop reason: SDUPTHER

## 2019-12-27 RX ORDER — CLONAZEPAM 0.5 MG/1
0.5 TABLET ORAL 3 TIMES DAILY PRN
Qty: 90 TABLET | Refills: 1 | Status: SHIPPED | OUTPATIENT
Start: 2019-12-27 | End: 2020-02-25

## 2019-12-27 ASSESSMENT — ENCOUNTER SYMPTOMS: BACK PAIN: 1

## 2020-01-10 RX ORDER — LANOLIN ALCOHOL/MO/W.PET/CERES
CREAM (GRAM) TOPICAL
Qty: 30 TABLET | Refills: 2 | Status: SHIPPED | OUTPATIENT
Start: 2020-01-10 | End: 2020-03-20 | Stop reason: SDUPTHER

## 2020-01-10 RX ORDER — HYDROCODONE BITARTRATE AND ACETAMINOPHEN 5; 325 MG/1; MG/1
1 TABLET ORAL 2 TIMES DAILY
Qty: 120 TABLET | Refills: 0 | OUTPATIENT
Start: 2020-01-10 | End: 2020-02-09

## 2020-01-20 ENCOUNTER — TELEPHONE (OUTPATIENT)
Dept: PRIMARY CARE CLINIC | Age: 75
End: 2020-01-20

## 2020-01-20 NOTE — TELEPHONE ENCOUNTER
Deepali Murray was last refilled 12/7/19. Please call new rx into [facility] on [facility].

## 2020-01-20 NOTE — TELEPHONE ENCOUNTER
She was supposed to decrease norco to BID to TID, she should have pills left over if 120 were filled on 12/27. Script was sent on 12/27  Please call her pharmacy about this.

## 2020-01-21 RX ORDER — HYDROCODONE BITARTRATE AND ACETAMINOPHEN 5; 325 MG/1; MG/1
1 TABLET ORAL 3 TIMES DAILY PRN
Qty: 90 TABLET | Refills: 0 | Status: SHIPPED | OUTPATIENT
Start: 2020-01-21 | End: 2020-01-30 | Stop reason: ALTCHOICE

## 2020-01-21 RX ORDER — FLUTICASONE FUROATE AND VILANTEROL 200; 25 UG/1; UG/1
1 POWDER RESPIRATORY (INHALATION) DAILY
Qty: 28 EACH | Refills: 3 | Status: SHIPPED | OUTPATIENT
Start: 2020-01-21 | End: 2020-06-11

## 2020-01-22 ENCOUNTER — HOSPITAL ENCOUNTER (EMERGENCY)
Age: 75
Discharge: HOME | End: 2020-01-22
Attending: EMERGENCY MEDICINE
Payer: COMMERCIAL

## 2020-01-22 ENCOUNTER — APPOINTMENT (OUTPATIENT)
Dept: GENERAL RADIOLOGY | Age: 75
End: 2020-01-22
Payer: COMMERCIAL

## 2020-01-22 ENCOUNTER — APPOINTMENT (OUTPATIENT)
Dept: CT IMAGING | Age: 75
End: 2020-01-22
Payer: COMMERCIAL

## 2020-01-22 VITALS
WEIGHT: 152 LBS | RESPIRATION RATE: 16 BRPM | HEART RATE: 68 BPM | SYSTOLIC BLOOD PRESSURE: 145 MMHG | DIASTOLIC BLOOD PRESSURE: 87 MMHG | BODY MASS INDEX: 28.72 KG/M2 | OXYGEN SATURATION: 95 % | TEMPERATURE: 97.9 F

## 2020-01-22 PROCEDURE — 73030 X-RAY EXAM OF SHOULDER: CPT

## 2020-01-22 PROCEDURE — 70450 CT HEAD/BRAIN W/O DYE: CPT

## 2020-01-22 PROCEDURE — 99284 EMERGENCY DEPT VISIT MOD MDM: CPT

## 2020-01-22 RX ORDER — IBUPROFEN 200 MG
400 TABLET ORAL EVERY 6 HOURS PRN
COMMUNITY
End: 2022-04-29 | Stop reason: ALTCHOICE

## 2020-01-22 ASSESSMENT — ENCOUNTER SYMPTOMS
TROUBLE SWALLOWING: 0
COUGH: 0
SINUS PRESSURE: 0
DIARRHEA: 0
SORE THROAT: 0
EYE PAIN: 0
FACIAL SWELLING: 0
VOMITING: 0
ABDOMINAL PAIN: 0
CONSTIPATION: 0
EYE DISCHARGE: 0
RHINORRHEA: 0
EYE REDNESS: 0
BLOOD IN STOOL: 0
NAUSEA: 0
WHEEZING: 0
SHORTNESS OF BREATH: 0
BACK PAIN: 0
COLOR CHANGE: 0
CHEST TIGHTNESS: 0

## 2020-01-22 ASSESSMENT — PAIN SCALES - GENERAL: PAINLEVEL_OUTOF10: 3

## 2020-01-22 ASSESSMENT — PAIN DESCRIPTION - LOCATION: LOCATION: HEAD;SHOULDER

## 2020-01-22 ASSESSMENT — PAIN DESCRIPTION - ORIENTATION: ORIENTATION: RIGHT

## 2020-01-22 ASSESSMENT — PAIN DESCRIPTION - PAIN TYPE: TYPE: ACUTE PAIN

## 2020-01-22 NOTE — ED PROVIDER NOTES
[address]  eMERGENCY dEPARTMENT eNCOUnter      Pt Name: Rayo Sepulveda  MRN: [id number]  [place] 1945  Date of evaluation: 1/22/20      CHIEF COMPLAINT       Chief Complaint   Patient presents with    Fall    Head Injury    Shoulder Injury         HISTORY OF PRESENT ILLNESS    Rayo Sepulveda is a 76 y.o. female who presents complaining of fall. Patient states that she was using her walker today when she kind of bent over to grab something and her legs gave out and she fell on her right shoulder and hit her head. Patient did not lose consciousness. Patient has a headache only at the spot where she hit with no numbness tingling or weakness. Patient has no neck pain back pain chest pain or abdominal pain. Patient is currently undergoing some outpatient rehab for her lower extremity weakness. Patient also broke her right shoulder in July last year and has been having chronic pain issues since then. REVIEW OF SYSTEMS       Review of Systems   Constitutional: Negative for activity change, appetite change, chills, diaphoresis and fever. HENT: Negative for congestion, ear pain, facial swelling, nosebleeds, rhinorrhea, sinus pressure, sore throat and trouble swallowing. Eyes: Negative for pain, discharge and redness. Respiratory: Negative for cough, chest tightness, shortness of breath and wheezing. Cardiovascular: Negative for chest pain, palpitations and leg swelling. Gastrointestinal: Negative for abdominal pain, blood in stool, constipation, diarrhea, nausea and vomiting. Genitourinary: Negative for difficulty urinating, dysuria, flank pain, frequency, genital sores and hematuria. Musculoskeletal: Negative for arthralgias, back pain, gait problem, joint swelling, myalgias and neck pain. Fall with injury to head and shoulder   Skin: Negative for color change, pallor, rash and wound.    Neurological: Negative for dizziness, tremors, seizures, syncope, speech difficulty, weakness, numbness and headaches. Psychiatric/Behavioral: Negative for confusion, decreased concentration, hallucinations, self-injury, sleep disturbance and suicidal ideas. PAST MEDICAL HISTORY     Past Medical History:   Diagnosis Date    Alcoholism ([address] 10/20/2016    Anxiety     Cataract     Chronic pain     Depression     Hematochezia 4/7/2017    Hypothyroid     Osteoporosis     Peptic ulcer of duodenum     chronic    Schatzki's ring     Sessile rectal polyp 04/10/2017    multi tiny    Tobacco abuse     Tubular adenoma of colon 04/10/2017    x2       SURGICAL HISTORY       Past Surgical History:   Procedure Laterality Date    CARPAL TUNNEL RELEASE      CATARACT REMOVAL      COLONOSCOPY  04/10/2017    multi tiny sessile polyps; poor prep; tubular adenoma x 2    HERNIA REPAIR      HYSTERECTOMY      KYPHOSIS SURGERY  10/27/2016    kyphoplasty L1 and L5    NH COLSC FLX W/RMVL OF TUMOR POLYP LESION SNARE TQ N/A 4/10/2017    COLONOSCOPY POLYPECTOMY SNARE x2 with photos performed by Francia Pinzon MD at [address] EGD TRANSORAL BIOPSY SINGLE/MULTIPLE N/A 4/10/2017    EGD BIOPSY x2 with photos performed by Francia Pinzon MD at [address] [facility] ENDOSCOPY  04/10/2017    schatzki's ring; chronic peptic duodenitis       CURRENT MEDICATIONS       Current Discharge Medication List      CONTINUE these medications which have NOT CHANGED    Details   ibuprofen (ADVIL;MOTRIN) 200 MG tablet Take 400 mg by mouth every 6 hours as needed for Pain      Fluticasone furoate-vilanterol (BREO ELLIPTA) 200-25 MCG/INH AEPB inhaler Inhale 1 puff into the lungs daily  Qty: 28 each, Refills: 3      HYDROcodone-acetaminophen (NORCO) 5-325 MG per tablet Take 1 tablet by mouth 3 times daily as needed for Pain for up to 30 days. Qty: 90 tablet, Refills: 0    Comments: Reduce doses taken as pain becomes manageable  Associated Diagnoses: Arthritis;  Other chronic pain; Closed fracture of right shoulder with routine healing, subsequent encounter      vitamin B-12 (CYANOCOBALAMIN) 1000 MCG tablet TAKE ONE TABLET BY MOUTH DAILY  Qty: 30 tablet, Refills: 2      clonazePAM (KLONOPIN) 0.5 MG tablet Take 1 tablet by mouth 3 times daily as needed (tremors  per neurology) for up to 30 days. Qty: 90 tablet, Refills: 1    Associated Diagnoses: Tremor      pantoprazole (PROTONIX) 40 MG tablet TAKE ONE TABLET BY MOUTH DAILY  Qty: 90 tablet, Refills: 2      polyvinyl alcohol-povidone (CLEAR EYES NATURAL TEARS) 5-6 MG/ML SOLN opthalmic solution Place 1 drop into both eyes as needed for Dry Eyes       Menthol (BIOFREEZE) 10 % AERO Apply topically daily as needed Indications: applies to back, shoulders and knees       albuterol sulfate HFA (PROVENTIL HFA) 108 (90 Base) MCG/ACT inhaler Inhale 1-2 puffs into the lungs every 4 hours as needed for Wheezing or Shortness of Breath (Space out to every 6 hours as symptoms improve)  Qty: 1 Inhaler, Refills: 3      citalopram (CELEXA) 40 MG tablet TAKE ONE TABLET BY MOUTH DAILY  Qty: 30 tablet, Refills: 3      levothyroxine (SYNTHROID) 50 MCG tablet TAKE ONE TABLET BY MOUTH DAILY MONDAY THROUGH SATURDAY AND SKIP SUNDAY  Qty: 77 tablet, Refills: 3    Associated Diagnoses: Hypothyroidism, unspecified type      docusate sodium (COLACE) 100 MG capsule Take 1 capsule by mouth daily  Qty: 30 capsule, Refills: 3      folic acid (FOLVITE) 1 MG tablet Take 1 tablet by mouth daily  Qty: 30 tablet, Refills: 3      Vitamin D (CHOLECALCIFEROL) 1000 UNITS CAPS capsule Take 1,000 Units by mouth daily      meloxicam (MOBIC) 15 MG tablet Take 1 tablet by mouth daily  Qty: 30 tablet, Refills: 3    Associated Diagnoses: Arthritis;  Other chronic pain; Compression fracture of lumbar vertebra, unspecified lumbar vertebral level, sequela      guaiFENesin (MUCINEX) 600 MG extended release tablet Take 1 tablet by mouth 2 times daily      denosumab (PROLIA) 60 MG/ML the emergency department:  No orders of the defined types were placed in this encounter. -------------------------  3:03 PM  Patient was reevaluated and updated on results and is okay for discharge home. CONSULTS:  None    PROCEDURES:  None    FINAL IMPRESSION      1. Hematoma of scalp, initial encounter    2. Acute pain of right shoulder          DISPOSITION/PLAN   DISPOSITION Decision To Discharge 01/22/2020 03:02:47 PM      PATIENT REFERREDTO:  Susie Fuentes MD  [address]   [address] [place] [address]    In 1 week      [facility]  [facility] [address]  [address] [facility] [address]  [phone]    If symptoms worsen      DISCHARGEMEDICATIONS:  Current Discharge Medication List          (Please note that portions of this note were completed with a voice recognition program.  Efforts were made to edit thedictations but occasionally words are mis-transcribed.)    Bony Glover MD  Attending Emergency Physician                        Bnoy Glover MD  01/22/20 1749

## 2020-01-22 NOTE — ED NOTES

## 2020-01-30 ENCOUNTER — OFFICE VISIT (OUTPATIENT)
Dept: PRIMARY CARE CLINIC | Age: 75
End: 2020-01-30
Payer: COMMERCIAL

## 2020-01-30 VITALS
DIASTOLIC BLOOD PRESSURE: 74 MMHG | OXYGEN SATURATION: 95 % | BODY MASS INDEX: 29.23 KG/M2 | HEIGHT: 61 IN | HEART RATE: 74 BPM | SYSTOLIC BLOOD PRESSURE: 120 MMHG | WEIGHT: 154.8 LBS | RESPIRATION RATE: 16 BRPM

## 2020-01-30 PROCEDURE — 99213 OFFICE O/P EST LOW 20 MIN: CPT | Performed by: FAMILY MEDICINE

## 2020-01-30 ASSESSMENT — ENCOUNTER SYMPTOMS: BACK PAIN: 1

## 2020-01-30 NOTE — PATIENT INSTRUCTIONS
Patient Education        Preventing Falls: Care Instructions  Your Care Instructions    Getting around your home safely can be a challenge if you have injuries or health problems that make it easy for you to fall. Loose rugs and furniture in walkways are among the dangers for many older people who have problems walking or who have poor eyesight. People who have conditions such as arthritis, osteoporosis, or dementia also have to be careful not to fall. You can make your home safer with a few simple measures. Follow-up care is a key part of your treatment and safety. Be sure to make and go to all appointments, and call your doctor if you are having problems. It's also a good idea to know your test results and keep a list of the medicines you take. How can you care for yourself at home? Taking care of yourself  · You may get dizzy if you do not drink enough water. To prevent dehydration, drink plenty of fluids, enough so that your urine is light yellow or clear like water. Choose water and other caffeine-free clear liquids. If you have kidney, heart, or liver disease and have to limit fluids, talk with your doctor before you increase the amount of fluids you drink. · Exercise regularly to improve your strength, muscle tone, and balance. Walk if you can. Swimming may be a good choice if you cannot walk easily. · Have your vision and hearing checked each year or any time you notice a change. If you have trouble seeing and hearing, you might not be able to avoid objects and could lose your balance. · Know the side effects of the medicines you take. Ask your doctor or pharmacist whether the medicines you take can affect your balance. Sleeping pills or sedatives can affect your balance. · Limit the amount of alcohol you drink. Alcohol can impair your balance and other senses. · Ask your doctor whether calluses or corns on your feet need to be removed.  If you wear loose-fitting shoes because of calluses or corns, you can lose your balance and fall. · Talk to your doctor if you have numbness in your feet. Preventing falls at home  · Remove raised doorway thresholds, throw rugs, and clutter. Repair loose carpet or raised areas in the floor. · Move furniture and electrical cords to keep them out of walking paths. · Use nonskid floor wax, and wipe up spills right away, especially on ceramic tile floors. · If you use a walker or cane, put rubber tips on it. If you use crutches, clean the bottoms of them regularly with an abrasive pad, such as steel wool. · Keep your house well lit, especially [place], and outside walkways. Use night-lights in areas such as hallways and bathrooms. Add extra light switches or use remote switches (such as switches that go on or off when you clap your hands) to make it easier to turn lights on if you have to get up during the night. · Install sturdy handrails on stairways. · Move items in your cabinets so that the things you use a lot are on the lower shelves (about waist level). · Keep a cordless phone and a flashlight with new batteries by your bed. If possible, put a phone in each of the main rooms of your house, or carry a cell phone in case you fall and cannot reach a phone. Or, you can wear a device around your neck or wrist. You push a button that sends a signal for help. · Wear low-heeled shoes that fit well and give your feet good support. Use footwear with nonskid soles. Check the heels and soles of your shoes for wear. Repair or replace worn heels or soles. · Do not wear socks without shoes on wood floors. · Walk on the grass when the sidewalks are slippery. If you live in an area that gets snow and ice in the winter, sprinkle salt on slippery steps and sidewalks. Preventing falls in the bath  · Install grab bars and nonskid mats inside and outside your shower or tub and near the toilet and sinks. · Use shower chairs and bath benches.   · Use a hand-held shower head that will allow you to sit while showering. · Get into a tub or shower by putting the weaker leg in first. Get out of a tub or shower with your strong side first.  · Repair loose toilet seats and consider installing a raised toilet seat to make getting on and off the toilet easier. · Keep your bathroom door unlocked while you are in the shower. Where can you learn more? Go to [email or web address] and sign in to your [employer] account. Enter [phone] in the [employer]Serv box to learn more about \"Preventing Falls: Care Instructions. \"     If you do not have an account, please click on the \"Sign Up Now\" link. Current as of: August 6, 2019  Content Version: 12.3  © 6223-7840 Healthwise, Incorporated. Care instructions adapted under license by [facility] ([facility]). If you have questions about a medical condition or this instruction, always ask your healthcare professional. [address] any warranty or liability for your use of this information.

## 2020-01-30 NOTE — PROGRESS NOTES
SHOULDER ARTHROSCOPY      TUBAL LIGATION      UPPER GASTROINTESTINAL ENDOSCOPY  04/10/2017    schatzki's ring; chronic peptic duodenitis       Family History   Problem Relation Age of Onset    Heart Disease Mother     Asthma Mother     Heart Disease Father        Social History     Tobacco Use    Smoking status: Current Every Day Smoker     Packs/day: 1.00     Years: 65.00     Pack years: 65.00     Types: Cigarettes    Smokeless tobacco: Never Used   Substance Use Topics    Alcohol use:  Yes     Alcohol/week: 1.0 standard drinks     Types: 1 Shots of liquor per week     Comment: lani with ilya (1/2 and 1/2) daily      Current Outpatient Medications   Medication Sig Dispense Refill    ibuprofen (ADVIL;MOTRIN) 200 MG tablet Take 400 mg by mouth every 6 hours as needed for Pain      Fluticasone furoate-vilanterol (BREO ELLIPTA) 200-25 MCG/INH AEPB inhaler Inhale 1 puff into the lungs daily 28 each 3    vitamin B-12 (CYANOCOBALAMIN) 1000 MCG tablet TAKE ONE TABLET BY MOUTH DAILY 30 tablet 2    meloxicam (MOBIC) 15 MG tablet Take 1 tablet by mouth daily 30 tablet 3    pantoprazole (PROTONIX) 40 MG tablet TAKE ONE TABLET BY MOUTH DAILY 90 tablet 2    guaiFENesin (MUCINEX) 600 MG extended release tablet Take 1 tablet by mouth 2 times daily      denosumab (PROLIA) 60 MG/ML SOSY SC injection Inject 60 mg into the skin every 6 months Indications: next due Jan 2020      nystatin (MYCOSTATIN) 492556 UNIT/ML suspension Take 500,000 Units by mouth 4 times daily Indications: thrush Swoosh and swallow for thrush      polyvinyl alcohol-povidone (CLEAR EYES NATURAL TEARS) 5-6 MG/ML SOLN opthalmic solution Place 1 drop into both eyes as needed for Dry Eyes       Menthol (BIOFREEZE) 10 % AERO Apply topically daily as needed Indications: applies to back, shoulders and knees       albuterol sulfate HFA (PROVENTIL HFA) 108 (90 Base) MCG/ACT inhaler Inhale 1-2 puffs into the lungs every 4 hours as needed for Wheezing or Shortness of Breath (Space out to every 6 hours as symptoms improve) 1 Inhaler 3    citalopram (CELEXA) 40 MG tablet TAKE ONE TABLET BY MOUTH DAILY 30 tablet 3    levothyroxine (SYNTHROID) 50 MCG tablet TAKE ONE TABLET BY MOUTH DAILY MONDAY THROUGH SATURDAY AND SKIP SUNDAY 77 tablet 3    docusate sodium (COLACE) 100 MG capsule Take 1 capsule by mouth daily 30 capsule 3    folic acid (FOLVITE) 1 MG tablet Take 1 tablet by mouth daily 30 tablet 3    Vitamin D (CHOLECALCIFEROL) 1000 UNITS CAPS capsule Take 1,000 Units by mouth daily      clonazePAM (KLONOPIN) 0.5 MG tablet Take 1 tablet by mouth 3 times daily as needed (tremors  per neurology) for up to 30 days. 90 tablet 1     No current facility-administered medications for this visit. Allergies   Allergen Reactions    Bactrim [Sulfamethoxazole-Trimethoprim] Diarrhea and Nausea And Vomiting    Motrin [Ibuprofen] Diarrhea           Sulfa Antibiotics Diarrhea and Nausea And Vomiting    Wellbutrin [Bupropion] Other (See Comments)     Lightheaded, Dizziness       Health Maintenance   Topic Date Due    Hepatitis C screen  1945    Lipid screen  12/24/1985    Shingles Vaccine (1 of 2) 12/24/1995    DEXA (modify frequency per FRAX score)  12/24/2010    Low dose CT lung screening  02/28/2019    Colon cancer screen colonoscopy  04/10/2019    Annual Wellness Visit (AWV)  05/29/2019    TSH testing  07/05/2020    Breast cancer screen  01/21/2021    DTaP/Tdap/Td vaccine (3 - Td) 09/19/2027    Flu vaccine  Completed    Pneumococcal 65+ years Vaccine  Completed       Subjective:      Review of Systems   Musculoskeletal: Positive for arthralgias and back pain. Neurological: Positive for weakness. Negative for dizziness. Objective:     /74   Pulse 74   Resp 16   Ht 5' 1\" (1.549 m)   Wt 154 lb 12.8 oz (70.2 kg)   SpO2 95%   BMI 29.25 kg/m²   Physical Exam  Vitals signs and nursing note reviewed.    Constitutional:       General: understanding. Reviewed health maintenance. Instructed to continue current medications. .  Patient agreed with treatment plan. Follow up as directed.      Electronicallysigned by Lalito Hirsch MD on 1/30/2020 at 2:48 PM

## 2020-02-11 ENCOUNTER — TELEPHONE (OUTPATIENT)
Dept: PRIMARY CARE CLINIC | Age: 75
End: 2020-02-11

## 2020-02-11 NOTE — TELEPHONE ENCOUNTER
Per Adela parker called back states she thinks she gave us a phone number and not a fax number for PT orders but there is no encounter. Please fax Pt orders to [phone]. Please advise.

## 2020-02-19 ENCOUNTER — TELEPHONE (OUTPATIENT)
Dept: PRIMARY CARE CLINIC | Age: 75
End: 2020-02-19

## 2020-02-19 RX ORDER — ACETAMINOPHEN AND CODEINE PHOSPHATE 300; 30 MG/1; MG/1
1 TABLET ORAL 2 TIMES DAILY PRN
Qty: 60 TABLET | Refills: 0 | Status: SHIPPED | OUTPATIENT
Start: 2020-02-19 | End: 2020-03-18

## 2020-02-19 NOTE — TELEPHONE ENCOUNTER
Pt is done with the Norco. States you were going to switch her to Tyl # 3. Would like rx sent to [facility] [address] listed.

## 2020-02-25 RX ORDER — CLONAZEPAM 0.5 MG/1
TABLET ORAL
Qty: 90 TABLET | Refills: 0 | Status: SHIPPED | OUTPATIENT
Start: 2020-02-25 | End: 2020-03-30 | Stop reason: SDUPTHER

## 2020-03-12 ENCOUNTER — OFFICE VISIT (OUTPATIENT)
Dept: PRIMARY CARE CLINIC | Age: 75
End: 2020-03-12
Payer: COMMERCIAL

## 2020-03-12 VITALS
BODY MASS INDEX: 28.95 KG/M2 | DIASTOLIC BLOOD PRESSURE: 74 MMHG | WEIGHT: 153.2 LBS | HEART RATE: 70 BPM | SYSTOLIC BLOOD PRESSURE: 120 MMHG | OXYGEN SATURATION: 93 %

## 2020-03-12 PROCEDURE — 99213 OFFICE O/P EST LOW 20 MIN: CPT | Performed by: FAMILY MEDICINE

## 2020-03-12 ASSESSMENT — ENCOUNTER SYMPTOMS: RESPIRATORY NEGATIVE: 1

## 2020-03-12 NOTE — PROGRESS NOTES
Age of Onset    Heart Disease Mother     Asthma Mother     Heart Disease Father        Social History     Tobacco Use    Smoking status: Current Every Day Smoker     Packs/day: 1.00     Years: 65.00     Pack years: 65.00     Types: Cigarettes    Smokeless tobacco: Never Used   Substance Use Topics    Alcohol use: Yes     Alcohol/week: 1.0 standard drinks     Types: 1 Shots of liquor per week     Comment: lani with ilya (1/2 and 1/2) daily      Current Outpatient Medications   Medication Sig Dispense Refill    Pseudoephedrine-DM-GG (SUDAFED COUGH PO) Take by mouth      clonazePAM (KLONOPIN) 0.5 MG tablet TAKE ONE TABLET BY MOUTH THREE TIMES A DAY AS NEEDED 90 tablet 0    acetaminophen-codeine (TYLENOL/CODEINE #3) 300-30 MG per tablet Take 1 tablet by mouth 2 times daily as needed for Pain for up to 30 days. Intended supply: 30 days.  60 tablet 0    ibuprofen (ADVIL;MOTRIN) 200 MG tablet Take 400 mg by mouth every 6 hours as needed for Pain      vitamin B-12 (CYANOCOBALAMIN) 1000 MCG tablet TAKE ONE TABLET BY MOUTH DAILY 30 tablet 2    meloxicam (MOBIC) 15 MG tablet Take 1 tablet by mouth daily 30 tablet 3    pantoprazole (PROTONIX) 40 MG tablet TAKE ONE TABLET BY MOUTH DAILY 90 tablet 2    guaiFENesin (MUCINEX) 600 MG extended release tablet Take 1 tablet by mouth 2 times daily      denosumab (PROLIA) 60 MG/ML SOSY SC injection Inject 60 mg into the skin every 6 months Indications: next due Jan 2020      nystatin (MYCOSTATIN) 331460 UNIT/ML suspension Take 500,000 Units by mouth 4 times daily Indications: thrush Swoosh and swallow for thrush      polyvinyl alcohol-povidone (CLEAR EYES NATURAL TEARS) 5-6 MG/ML SOLN opthalmic solution Place 1 drop into both eyes as needed for Dry Eyes       Menthol (BIOFREEZE) 10 % AERO Apply topically daily as needed Indications: applies to back, shoulders and knees       albuterol sulfate HFA (PROVENTIL HFA) 108 (90 Base) MCG/ACT inhaler Inhale 1-2 puffs into the lungs every 4 hours as needed for Wheezing or Shortness of Breath (Space out to every 6 hours as symptoms improve) 1 Inhaler 3    citalopram (CELEXA) 40 MG tablet TAKE ONE TABLET BY MOUTH DAILY 30 tablet 3    levothyroxine (SYNTHROID) 50 MCG tablet TAKE ONE TABLET BY MOUTH DAILY MONDAY THROUGH SATURDAY AND SKIP SUNDAY 77 tablet 3    docusate sodium (COLACE) 100 MG capsule Take 1 capsule by mouth daily 30 capsule 3    folic acid (FOLVITE) 1 MG tablet Take 1 tablet by mouth daily 30 tablet 3    Vitamin D (CHOLECALCIFEROL) 1000 UNITS CAPS capsule Take 1,000 Units by mouth daily       No current facility-administered medications for this visit. Allergies   Allergen Reactions    Bactrim [Sulfamethoxazole-Trimethoprim] Diarrhea and Nausea And Vomiting    Motrin [Ibuprofen] Diarrhea           Sulfa Antibiotics Diarrhea and Nausea And Vomiting    Wellbutrin [Bupropion] Other (See Comments)     Lightheaded, Dizziness       Health Maintenance   Topic Date Due    Hepatitis C screen  1945    Lipid screen  12/24/1985    Shingles Vaccine (1 of 2) 12/24/1995    DEXA (modify frequency per FRAX score)  12/24/2010    Low dose CT lung screening  02/28/2019    Colon cancer screen colonoscopy  04/10/2019    Annual Wellness Visit (AWV)  05/29/2019    TSH testing  07/05/2020    Breast cancer screen  01/21/2021    DTaP/Tdap/Td vaccine (3 - Td) 09/19/2027    Flu vaccine  Completed    Pneumococcal 65+ years Vaccine  Completed    Hepatitis A vaccine  Aged Out    Hepatitis B vaccine  Aged Out    Hib vaccine  Aged Out    Meningococcal (ACWY) vaccine  Aged Out       Subjective:      Review of Systems   Respiratory: Negative. Cardiovascular: Negative. Neurological: Negative for dizziness and light-headedness. moving [facility] an senior apartment at [facility].      Objective:     /74 (Site: Right Upper Arm, Position: Sitting, Cuff Size: Medium Adult)   Pulse 70   Wt 153 lb 3.2 oz (69.5 kg) treatment plan. Follow up as directed.      Electronicallysigned by Abihshek Mead MD on 3/12/2020 at 1:19 PM

## 2020-03-18 RX ORDER — ACETAMINOPHEN AND CODEINE PHOSPHATE 300; 30 MG/1; MG/1
TABLET ORAL
Qty: 60 TABLET | Refills: 0 | Status: SHIPPED | OUTPATIENT
Start: 2020-03-18 | End: 2020-04-15 | Stop reason: SDUPTHER

## 2020-03-20 RX ORDER — CITALOPRAM 40 MG/1
TABLET ORAL
Qty: 30 TABLET | Refills: 3 | Status: SHIPPED | OUTPATIENT
Start: 2020-03-20 | End: 2020-07-01

## 2020-03-20 RX ORDER — ALBUTEROL SULFATE 90 UG/1
1-2 AEROSOL, METERED RESPIRATORY (INHALATION) EVERY 4 HOURS PRN
Qty: 1 INHALER | Refills: 3 | Status: SHIPPED | OUTPATIENT
Start: 2020-03-20 | End: 2020-09-03 | Stop reason: SDUPTHER

## 2020-03-20 RX ORDER — LANOLIN ALCOHOL/MO/W.PET/CERES
CREAM (GRAM) TOPICAL
Qty: 30 TABLET | Refills: 2 | Status: SHIPPED | OUTPATIENT
Start: 2020-03-20 | End: 2020-07-01

## 2020-03-30 RX ORDER — CLONAZEPAM 0.5 MG/1
TABLET ORAL
Qty: 90 TABLET | Refills: 0 | Status: SHIPPED | OUTPATIENT
Start: 2020-03-30 | End: 2020-04-27 | Stop reason: SDUPTHER

## 2020-03-30 NOTE — TELEPHONE ENCOUNTER
[facility] requesting refill for pt   Last seen 3/12/20  Last fill 2/25/20      Please approve or deny

## 2020-04-15 ENCOUNTER — NURSE ONLY (OUTPATIENT)
Dept: PRIMARY CARE CLINIC | Age: 75
End: 2020-04-15
Payer: COMMERCIAL

## 2020-04-15 VITALS — WEIGHT: 157.4 LBS | BODY MASS INDEX: 29.74 KG/M2 | TEMPERATURE: 98.7 F

## 2020-04-15 PROCEDURE — 96372 THER/PROPH/DIAG INJ SC/IM: CPT | Performed by: FAMILY MEDICINE

## 2020-04-15 RX ORDER — MELOXICAM 15 MG/1
15 TABLET ORAL DAILY
Qty: 30 TABLET | Refills: 3 | Status: SHIPPED | OUTPATIENT
Start: 2020-04-15 | End: 2020-08-04

## 2020-04-15 NOTE — TELEPHONE ENCOUNTER
Pt here today for NV, could not remember the name of the other refill she needed.        Please approve or deny

## 2020-04-16 RX ORDER — ACETAMINOPHEN AND CODEINE PHOSPHATE 300; 30 MG/1; MG/1
1 TABLET ORAL 2 TIMES DAILY
Qty: 60 TABLET | Refills: 0 | Status: SHIPPED | OUTPATIENT
Start: 2020-04-16 | End: 2020-05-20 | Stop reason: SDUPTHER

## 2020-04-27 RX ORDER — CLONAZEPAM 0.5 MG/1
TABLET ORAL
Qty: 90 TABLET | Refills: 0 | Status: SHIPPED | OUTPATIENT
Start: 2020-04-27 | End: 2020-05-26 | Stop reason: SDUPTHER

## 2020-05-20 RX ORDER — ACETAMINOPHEN AND CODEINE PHOSPHATE 300; 30 MG/1; MG/1
1 TABLET ORAL 2 TIMES DAILY
Qty: 60 TABLET | Refills: 0 | Status: SHIPPED | OUTPATIENT
Start: 2020-05-20 | End: 2020-07-01

## 2020-05-26 RX ORDER — CLONAZEPAM 0.5 MG/1
TABLET ORAL
Qty: 90 TABLET | Refills: 0 | Status: SHIPPED | OUTPATIENT
Start: 2020-05-26 | End: 2020-07-01

## 2020-06-04 ENCOUNTER — TELEPHONE (OUTPATIENT)
Dept: PRIMARY CARE CLINIC | Age: 75
End: 2020-06-04

## 2020-06-15 ENCOUNTER — OFFICE VISIT (OUTPATIENT)
Dept: PRIMARY CARE CLINIC | Age: 75
End: 2020-06-15
Payer: COMMERCIAL

## 2020-06-15 ENCOUNTER — HOSPITAL ENCOUNTER (OUTPATIENT)
Age: 75
Setting detail: SPECIMEN
Discharge: HOME | End: 2020-06-15
Payer: COMMERCIAL

## 2020-06-15 VITALS
BODY MASS INDEX: 29.76 KG/M2 | HEART RATE: 75 BPM | WEIGHT: 157.6 LBS | SYSTOLIC BLOOD PRESSURE: 130 MMHG | TEMPERATURE: 97.3 F | OXYGEN SATURATION: 94 % | DIASTOLIC BLOOD PRESSURE: 72 MMHG | HEIGHT: 61 IN | RESPIRATION RATE: 15 BRPM

## 2020-06-15 LAB
AMPHETAMINE SCREEN, URINE: NEGATIVE
BARBITURATE SCREEN, URINE: NEGATIVE
BENZODIAZEPINE SCREEN, URINE: NEGATIVE
BUPRENORPHINE URINE: NORMAL
COCAINE METABOLITE SCREEN URINE: NEGATIVE
GABAPENTIN SCREEN, URINE: NORMAL
HCT VFR BLD CALC: 38.2 % (ref 36.3–47.1)
HEMOGLOBIN: 12.2 G/DL (ref 11.9–15.1)
MCH RBC QN AUTO: 30.7 PG (ref 25.2–33.5)
MCHC RBC AUTO-ENTMCNC: 31.9 G/DL (ref 28.4–34.8)
MCV RBC AUTO: 96.2 FL (ref 82.6–102.9)
MDMA URINE: NEGATIVE
METHADONE SCREEN, URINE: NEGATIVE
METHAMPHETAMINE, URINE: NEGATIVE
NRBC AUTOMATED: 0 PER 100 WBC
OPIATE SCREEN URINE: POSITIVE
OXYCODONE SCREEN URINE: NEGATIVE
PDW BLD-RTO: 13.6 % (ref 11.8–14.4)
PHENCYCLIDINE SCREEN URINE: NEGATIVE
PLATELET # BLD: 368 K/UL (ref 138–453)
PMV BLD AUTO: 9.1 FL (ref 8.1–13.5)
PROPOXYPHENE SCREEN, URINE: NORMAL
RBC # BLD: 3.97 M/UL (ref 3.95–5.11)
THC SCREEN, URINE: NEGATIVE
THYROXINE, FREE: 1.3 NG/DL (ref 0.93–1.7)
TRICYCLIC ANTIDEPRESSANTS, UR: NEGATIVE
WBC # BLD: 10 K/UL (ref 3.5–11.3)

## 2020-06-15 PROCEDURE — 80305 DRUG TEST PRSMV DIR OPT OBS: CPT | Performed by: FAMILY MEDICINE

## 2020-06-15 PROCEDURE — 99213 OFFICE O/P EST LOW 20 MIN: CPT | Performed by: FAMILY MEDICINE

## 2020-06-15 ASSESSMENT — ENCOUNTER SYMPTOMS
BACK PAIN: 1
RESPIRATORY NEGATIVE: 1

## 2020-06-15 NOTE — PATIENT INSTRUCTIONS
steroid pills. Preventing an exacerbation  · Do not smoke. This is the most important step you can take to prevent more damage to your lungs and prevent problems. If you already smoke, it is never too late to stop. If you need help quitting, talk to your doctor about stop-smoking programs and medicines. These can increase your chances of quitting for good. · Take your daily medicines as prescribed. · Avoid colds and flu. ? Get a pneumococcal vaccine. ? Get a flu vaccine each year, as soon as it is available. Ask those you live or work with to do the same, so they will not get the flu and infect you. ? Try to stay away from people with colds or the flu. ? Wash your hands often. · Avoid secondhand smoke; air pollution; cold, dry air; hot, humid air; and high altitudes. Stay at home with your windows closed when air pollution is bad. · Learn breathing techniques for COPD, such as breathing through pursed lips. These techniques can help you breathe easier during an exacerbation. When should you call for help? [id number] anytime you think you may need emergency care. For example, call if:  · You have severe trouble breathing. · You have severe chest pain. Call your doctor now or seek immediate medical care if:  · You have new or worse shortness of breath. · You develop new chest pain. · You are coughing more deeply or more often, especially if you notice more mucus or a change in the color of your mucus. · You cough up blood. · You have new or increased swelling in your legs or belly. · You have a fever. Watch closely for changes in your health, and be sure to contact your doctor if:  · You need to use your antibiotic or steroid pills. · Your symptoms are getting worse. Where can you learn more? Go to [email or web address].[email or web address] org and sign in to your [employer] account. Enter Z456 in the RightAnswers box to learn more about \"COPD Exacerbation Plan: Care Instructions. \"     If you do not have an account, please click on the \"Sign Up Now\" link. Current as of: February 24, 2020               Content Version: 12.5  © 2006-2020 Healthwise, Incorporated. Care instructions adapted under license by [facility] ([facility]). If you have questions about a medical condition or this instruction, always ask your healthcare professional. [address] any warranty or liability for your use of this information.

## 2020-06-15 NOTE — PROGRESS NOTES
Nash Archie a 76 y.o. female who presents today for her medical conditions/complaints as notedbelow. Chief Complaint   Patient presents with    Medication Check         HPI:   HPI  Tremors under control  Has a lot of pain in right arm from previous fracture and back pain from previous fracture, falls. Uses pain pills prn  Daughter helps monitor meds. Gets Meals on WHeels  Increased stress with spouses illness.      No results found for: LDLCHOLESTEROL, LDLCALC    (goal LDL is <100)   AST (U/L)   Date Value   12/02/2019 14     ALT (U/L)   Date Value   12/02/2019 7     BUN (mg/dL)   Date Value   12/02/2019 10     BP Readings from Last 3 Encounters:   06/15/20 130/72   03/12/20 120/74   01/30/20 120/74          (goal 120/80)    Past Medical History:   Diagnosis Date    Alcoholism ([address] 10/20/2016    Anxiety     Cataract     Chronic pain     Depression     Hematochezia 4/7/2017    Hypothyroid     Osteoporosis     Peptic ulcer of duodenum     chronic    Schatzki's ring     Sessile rectal polyp 04/10/2017    multi tiny    Tobacco abuse     Tubular adenoma of colon 04/10/2017    x2      Past Surgical History:   Procedure Laterality Date    CARPAL TUNNEL RELEASE      CATARACT REMOVAL      COLONOSCOPY  04/10/2017    multi tiny sessile polyps; poor prep; tubular adenoma x 2    HERNIA REPAIR      HYSTERECTOMY      KYPHOSIS SURGERY  10/27/2016    kyphoplasty L1 and L5    MD COLSC FLX W/RMVL OF TUMOR POLYP LESION SNARE TQ N/A 4/10/2017    COLONOSCOPY POLYPECTOMY SNARE x2 with photos performed by Woody Marinelli MD at [address] EGD TRANSORAL BIOPSY SINGLE/MULTIPLE N/A 4/10/2017    EGD BIOPSY x2 with photos performed by Woody Marinelli MD at [address] [address] ARTHROSCOPY      TUBAL LIGATION      UPPER GASTROINTESTINAL ENDOSCOPY  04/10/2017    schatzki's ring; chronic peptic duodenitis       Family History   Problem Relation Age of Onset    Heart Disease Mother     Asthma Mother    [facility] 29.78 kg/m²   Physical Exam  Vitals signs and nursing note reviewed. Constitutional:       General: She is not in acute distress. Appearance: She is well-developed. HENT:      Head: Normocephalic and atraumatic. Right Ear: Tympanic membrane, ear canal and external ear normal.      Left Ear: Tympanic membrane, ear canal and external ear normal.      Mouth/Throat:      Mouth: Mucous membranes are moist.   Eyes:      General: No scleral icterus. Right eye: No discharge. Left eye: No discharge. Conjunctiva/sclera: Conjunctivae normal.      Pupils: Pupils are equal, round, and reactive to light. Neck:      Thyroid: No thyromegaly. Trachea: No tracheal deviation. Cardiovascular:      Rate and Rhythm: Normal rate and regular rhythm. Heart sounds: Normal heart sounds. Comments: No carotid bruits  Pulmonary:      Effort: Pulmonary effort is normal. No respiratory distress. Breath sounds: Normal breath sounds. No wheezing. Musculoskeletal:      Right lower leg: No edema. Left lower leg: No edema. Comments: Uses walker   Lymphadenopathy:      Cervical: No cervical adenopathy. Skin:     General: Skin is warm. Findings: No rash. Neurological:      Mental Status: She is alert and oriented to person, place, and time. Psychiatric:         Mood and Affect: Mood normal.         Behavior: Behavior normal.         Thought Content: Thought content normal.         Assessment:       Diagnosis Orders   1. Tremor  POCT Rapid Drug Screen    CBC   2. Other chronic pain  POCT Rapid Drug Screen    CBC   3. Chronic obstructive pulmonary disease, unspecified COPD type ([facility] [address]  CBC   4. Hypothyroidism, unspecified type  TSH 3RD GENERATION    T4, Free        Plan:      Return in about 3 months (around 9/15/2020).     Orders Placed This Encounter   Procedures    TSH 3RD GENERATION     Standing Status:   Future     Standing Expiration Date:   6/15/2021    T4, Free Standing Status:   Future     Standing Expiration Date:   6/15/2021    CBC     Standing Status:   Future     Standing Expiration Date:   6/15/2021    POCT Rapid Drug Screen     No orders of the defined types were placed in this encounter. Patient given educational materials - see patient instructions. Discussed use, benefit, and side effects of prescribed medications. All patient questions answered. Pt voiced understanding. Reviewed health maintenance. Instructed to continue current medications, diet. .  Patient agreed with treatment plan. Follow up as directed.      Electronicallysigned by Chris Tellez MD on 6/15/2020 at 2:58 PM

## 2020-07-01 RX ORDER — PANTOPRAZOLE SODIUM 40 MG/1
TABLET, DELAYED RELEASE ORAL
Qty: 90 TABLET | Refills: 1 | Status: SHIPPED | OUTPATIENT
Start: 2020-07-01 | End: 2021-03-22

## 2020-07-01 RX ORDER — CLONAZEPAM 0.5 MG/1
TABLET ORAL
Qty: 90 TABLET | Refills: 0 | Status: SHIPPED | OUTPATIENT
Start: 2020-07-01 | End: 2020-08-04

## 2020-07-01 RX ORDER — LANOLIN ALCOHOL/MO/W.PET/CERES
CREAM (GRAM) TOPICAL
Qty: 30 TABLET | Refills: 1 | Status: SHIPPED | OUTPATIENT
Start: 2020-07-01 | End: 2020-09-10

## 2020-07-01 RX ORDER — ACETAMINOPHEN AND CODEINE PHOSPHATE 300; 30 MG/1; MG/1
TABLET ORAL
Qty: 60 TABLET | Refills: 0 | Status: SHIPPED | OUTPATIENT
Start: 2020-07-01 | End: 2020-08-01 | Stop reason: SDUPTHER

## 2020-07-01 RX ORDER — CITALOPRAM 40 MG/1
TABLET ORAL
Qty: 30 TABLET | Refills: 2 | Status: SHIPPED | OUTPATIENT
Start: 2020-07-01 | End: 2020-08-11

## 2020-07-31 ENCOUNTER — HOSPITAL ENCOUNTER (OUTPATIENT)
Age: 75
Setting detail: SPECIMEN
Discharge: HOME | End: 2020-07-31
Payer: COMMERCIAL

## 2020-07-31 ENCOUNTER — OFFICE VISIT (OUTPATIENT)
Dept: PRIMARY CARE CLINIC | Age: 75
End: 2020-07-31
Payer: COMMERCIAL

## 2020-07-31 VITALS
SYSTOLIC BLOOD PRESSURE: 106 MMHG | DIASTOLIC BLOOD PRESSURE: 80 MMHG | WEIGHT: 155.8 LBS | HEART RATE: 86 BPM | BODY MASS INDEX: 30.59 KG/M2 | HEIGHT: 60 IN | OXYGEN SATURATION: 93 % | TEMPERATURE: 98.1 F

## 2020-07-31 LAB
CHOLESTEROL, FASTING: 217 MG/DL
CHOLESTEROL/HDL RATIO: 2.5
HDLC SERPL-MCNC: 88 MG/DL
LDL CHOLESTEROL: 117 MG/DL (ref 0–130)
TRIGLYCERIDE, FASTING: 60 MG/DL
VLDLC SERPL CALC-MCNC: ABNORMAL MG/DL (ref 1–30)

## 2020-07-31 PROCEDURE — G0296 VISIT TO DETERM LDCT ELIG: HCPCS | Performed by: FAMILY MEDICINE

## 2020-07-31 PROCEDURE — G0439 PPPS, SUBSEQ VISIT: HCPCS | Performed by: FAMILY MEDICINE

## 2020-07-31 SDOH — HEALTH STABILITY: MENTAL HEALTH: HOW OFTEN DO YOU HAVE A DRINK CONTAINING ALCOHOL?: 4 OR MORE TIMES A WEEK

## 2020-07-31 SDOH — HEALTH STABILITY: MENTAL HEALTH: HOW MANY STANDARD DRINKS CONTAINING ALCOHOL DO YOU HAVE ON A TYPICAL DAY?: 1 OR 2

## 2020-07-31 ASSESSMENT — PATIENT HEALTH QUESTIONNAIRE - PHQ9
1. LITTLE INTEREST OR PLEASURE IN DOING THINGS: 0
SUM OF ALL RESPONSES TO PHQ QUESTIONS 1-9: 0
SUM OF ALL RESPONSES TO PHQ9 QUESTIONS 1 & 2: 0
SUM OF ALL RESPONSES TO PHQ QUESTIONS 1-9: 0
2. FEELING DOWN, DEPRESSED OR HOPELESS: 0

## 2020-07-31 NOTE — PROGRESS NOTES
Medicare Annual Wellness Visit  Name: Shaina Meza Date: 2020   MRN: [id number] Sex: Female   Age: 76 y.o. Ethnicity: Non-/Non    : 1945 Race: Nathaniel Monterroso is here for Medicare AWV (Pt states no concerns today)    Screenings for behavioral, psychosocial and functional/safety risks, and cognitive dysfunction are all negative except as indicated below. These results, as well as other patient data from the [address] form, are documented in Flowsheets linked to this Encounter. Allergies   Allergen Reactions    Bactrim [Sulfamethoxazole-Trimethoprim] Diarrhea and Nausea And Vomiting    Motrin [Ibuprofen] Diarrhea           Sulfa Antibiotics Diarrhea and Nausea And Vomiting    Wellbutrin [Bupropion] Other (See Comments)     Lightheaded, Dizziness         Prior to Visit Medications    Medication Sig Taking?  Authorizing Provider   acetaminophen-codeine (TYLENOL #3) 300-30 MG per tablet TAKE ONE TABLET BY MOUTH TWICE A DAY Yes Yvette Oneal MD   vitamin B-12 (CYANOCOBALAMIN) 1000 MCG tablet TAKE ONE TABLET BY MOUTH DAILY Yes Yvette Oneal MD   citalopram (CELEXA) 40 MG tablet TAKE ONE TABLET BY MOUTH DAILY Yes Yvette Oneal MD   pantoprazole (PROTONIX) 40 MG tablet TAKE ONE TABLET BY MOUTH DAILY Yes Yvette Oneal MD   clonazePAM (KLONOPIN) 0.5 MG tablet TAKE ONE TABLET BY MOUTH THREE TIMES A DAY AS NEEDED Yes Yvette Oneal MD   BREO ELLIPTA 200-25 MCG/INH AEPB inhaler INHALE ONE DOSE BY MOUTH DAILY Yes Yvette Oneal MD   meloxicam (MOBIC) 15 MG tablet Take 1 tablet by mouth daily Yes Yvette Oneal MD   albuterol sulfate HFA (PROVENTIL HFA) 108 (90 Base) MCG/ACT inhaler Inhale 1-2 puffs into the lungs every 4 hours as needed for Wheezing or Shortness of Breath (Space out to every 6 hours as symptoms improve) Yes Yvette Oneal MD   Pseudoephedrine-DM-GG (SUDAFED COUGH PO) Take by mouth Yes Historical Provider, MD   ibuprofen (ADVIL;MOTRIN) 200 MG tablet Take 400 mg by mouth every 6 hours as needed for Pain Yes Historical Provider, MD   guaiFENesin (MUCINEX) 600 MG extended release tablet Take 1 tablet by mouth 2 times daily Yes King Megan MD   denosumab (PROLIA) 60 MG/ML SOSY SC injection Inject 60 mg into the skin every 6 months Indications: next due Jan 2020 Yes Historical Provider, MD   nystatin (MYCOSTATIN) 239151 UNIT/ML suspension Take 500,000 Units by mouth 4 times daily Indications: thrush Swoosh and swallow for thrush Yes Historical Provider, MD   polyvinyl alcohol-povidone (CLEAR EYES NATURAL TEARS) 5-6 MG/ML SOLN opthalmic solution Place 1 drop into both eyes as needed for Dry Eyes  Yes Historical Provider, MD   Menthol (BIOFREEZE) 10 % AERO Apply topically daily as needed Indications: applies to back, shoulders and knees  Yes Historical Provider, MD   levothyroxine (SYNTHROID) 50 MCG tablet TAKE ONE TABLET BY MOUTH DAILY [address] SKIP SUNDAY Yes Feliciano Greenfield MD   docusate sodium (COLACE) 100 MG capsule Take 1 capsule by mouth daily Yes Feliciano Greenfield MD   folic acid (FOLVITE) 1 MG tablet Take 1 tablet by mouth daily Yes Feliciano Greenfield MD   Vitamin D (CHOLECALCIFEROL) 1000 UNITS CAPS capsule Take 1,000 Units by mouth daily Yes Historical Provider, MD         Past Medical History:   Diagnosis Date    Alcoholism ([facility] [address] 10/20/2016    Anxiety     Cataract     Chronic pain     Depression     Hematochezia 4/7/2017    Hypothyroid     Osteoporosis     Peptic ulcer of duodenum     chronic    Schatzki's ring     Sessile rectal polyp 04/10/2017    multi tiny    Tobacco abuse     Tubular adenoma of colon 04/10/2017    x2       Past Surgical History:   Procedure Laterality Date    CARPAL TUNNEL RELEASE      CATARACT REMOVAL      COLONOSCOPY  04/10/2017    multi tiny sessile polyps; poor prep; tubular adenoma x 2    HERNIA REPAIR      HYSTERECTOMY      KYPHOSIS SURGERY 10/27/2016    kyphoplasty L1 and L5    [place] COLSC FLX W/RMVL OF TUMOR POLYP LESION SNARE TQ N/A 4/10/2017    COLONOSCOPY POLYPECTOMY SNARE x2 with photos performed by Larissa Good MD at [address] [place] [address] EGD TRANSORAL BIOPSY SINGLE/MULTIPLE N/A 4/10/2017    EGD BIOPSY x2 with photos performed by Larissa Good MD at [place] [address] ARTHROSCOPY      TUBAL LIGATION      UPPER GASTROINTESTINAL ENDOSCOPY  04/10/2017    schatzki's ring; chronic peptic duodenitis         Family History   Problem Relation Age of Onset    Heart Disease Mother     Asthma Mother     Heart Disease Father        CareTeam (Including outside providers/suppliers regularly involved in providing care):   Patient Care Team:  Easter Merlin, MD as PCP - General (Family Medicine)  Easter Merlin, MD as PCP - [address] [place] [address] Provider  Marty Vásquez MD as Consulting Physician (Gastroenterology)    Wt Readings from Last 3 Encounters:   07/31/20 155 lb 12.8 oz (70.7 kg)   06/15/20 157 lb 9.6 oz (71.5 kg)   04/15/20 157 lb 6.4 oz (71.4 kg)     Vitals:    07/31/20 0958   BP: 106/80   Pulse: 86   Temp: 98.1 °F (36.7 °C)   SpO2: 93%   Weight: 155 lb 12.8 oz (70.7 kg)   Height: 5' (1.524 m)     Body mass index is 30.43 kg/m². Based upon direct observation of the patient, evaluation of cognition reveals remembered 2 of 3 items, clock drawing ok. .    Physical Exam  Vitals signs and nursing note reviewed. Constitutional:       General: She is not in acute distress. Appearance: Normal appearance. She is well-developed. HENT:      Head: Normocephalic and atraumatic. Right Ear: Tympanic membrane and ear canal normal.      Left Ear: Tympanic membrane and ear canal normal.   Eyes:      General: No scleral icterus. Right eye: No discharge. Left eye: No discharge. Conjunctiva/sclera: Conjunctivae normal.      Pupils: Pupils are equal, round, and reactive to light. Neck:      Thyroid: No thyromegaly.       Trachea: No tracheal without trying in the past 3 months?: No  Do you eat fewer than 2 meals per day?: No  Have you seen a dentist within the past year?: (!) No  Body mass index is 30.43 kg/m². Health Habits/Nutrition Interventions:  · Dental exam overdue:  patient encouraged to make appointment with his/her dentist  · she uses a walking. Hearing/Vision:  No exam data present  Hearing/Vision  Do you or your family notice any trouble with your hearing?: No  Do you have difficulty driving, watching TV, or doing any of your daily activities because of your eyesight?: (!) Yes  Have you had an eye exam within the past year?: Yes  Hearing/Vision Interventions:  · Hearing concerns:  patient declines any further evaluation/treatment for hearing issues  · Vision concerns:  patient encouraged to make appointment with his/her eye specialist    ADL:  ADLs  In the past 7 days, did you need help from others to perform any of the following everyday activities? Eating, dressing, grooming, bathing, toileting, or walking/balance?: (!) Bathing  In the past 7 days, did you need help from others to take care of any of the following? Laundry, housekeeping, banking/finances, shopping, telephone use, food preparation, transportation, or taking medications?: [employer], Shopping, Transportation  ADL Interventions:  · she has home health aide. · She goes shopping with a motorized cart, some one drives her to the store.      Has been cutting back smoking     Personalized Preventive Plan   Current Health Maintenance Status  Immunization History   Administered Date(s) Administered    DT (pediatric) 04/02/1998    Influenza Virus Vaccine 10/02/2008, 09/20/2013, 10/30/2014, 11/02/2015    Influenza, High Dose (Fluzone 65 yrs and older) 11/02/2015, 09/25/2017, 09/25/2018    Influenza, Quadv, IM, PF (6 mo and older Fluzone, Flulaval, Fluarix, and 3 yrs and older Afluria) 10/20/2016    Influenza, Triv, inactivated, subunit, adjuvanted, IM (Fluad 65 yrs and older) 10/11/2019    Pneumococcal Conjugate 13-valent ([id number]) 04/23/2015    Pneumococcal Polysaccharide ([id number]) 10/20/2016    Tdap (Boostrix, Adacel) 09/19/2017        Health Maintenance   Topic Date Due    Hepatitis C screen  1945    Lipid screen  12/24/1985    Shingles Vaccine (1 of 2) 12/24/1995    DEXA (modify frequency per FRAX score)  12/24/2000    Low dose CT lung screening  02/28/2019    Colon cancer screen colonoscopy  04/10/2019    Annual Wellness Visit (AWV)  05/29/2019    TSH testing  07/05/2020    Flu vaccine (1) 09/01/2020    Breast cancer screen  01/21/2021    DTaP/Tdap/Td vaccine (3 - Td) 09/19/2027    Pneumococcal 65+ years Vaccine  Completed    Hepatitis A vaccine  Aged Out    Hepatitis B vaccine  Aged Out    Hib vaccine  Aged Out    Meningococcal (ACWY) vaccine  Aged Out     Recommendations for [employer] Due: see orders and patient instructions/AVS.  . Recommended screening schedule for the next 5-10 years is provided to the patient in written form: see Patient Елена Jain was seen today for medicare awv. Diagnoses and all orders for this visit:    Routine general medical examination at a health care facility    Due for colonoscopy. Low Dose CT (LDCT) Lung Screening criteria met   Age 50-69   Pack year smoking >30   Still smoking or less than 15 year since quit   No sign or symptoms of lung cancer   > 11 months since last LDCT     Risks and benefits of lung cancer screening with LDCT scans discussed:    Significance of positive screen - False-positive LDCT results often occur. 95% of all positive results do not lead to a diagnosis of cancer. Usually further imaging can resolve most false-positive results; however, some patients may require invasive procedures.     Over diagnosis risk - 10% to 12% of screen-detected lung cancer cases are over diagnosed--that is, the cancer would not have been detected in the patient's lifetime without the screening. Need for follow up screens annually to continue lung cancer screening effectiveness     Risks associated with radiation from annual LDCT- Radiation exposure is about the same as for a mammogram, which is about 1/3 of the annual background radiation exposure from everyday life. Starting screening at age 54 is not likely to increase cancer risk from radiation exposure. Patients with comorbidities resulting in life expectancy of < 10 years, or that would preclude treatment of an abnormality identified on CT, should not be screened due to lack of benefit.     To obtain maximal benefit from this screening, smoking cessation and long-term abstinence from smoking is critical

## 2020-07-31 NOTE — PATIENT INSTRUCTIONS
Personalized Preventive Plan for Melody Heath - 7/31/2020  Medicare offers a range of preventive health benefits. Some of the tests and screenings are paid in full while other may be subject to a deductible, co-insurance, and/or copay. Some of these benefits include a comprehensive review of your medical history including lifestyle, illnesses that may run in your family, and various assessments and screenings as appropriate. After reviewing your medical record and screening and assessments performed today your provider may have ordered immunizations, labs, imaging, and/or referrals for you. A list of these orders (if applicable) as well as your Preventive Care list are included within your After Visit Summary for your review. Other Preventive Recommendations:    · A preventive eye exam performed by an eye specialist is recommended every 1-2 years to screen for glaucoma; cataracts, macular degeneration, and other eye disorders. · A preventive dental visit is recommended every 6 months. · Try to get at least 150 minutes of exercise per week or 10,000 steps per day on a pedometer . · Order or download the FREE \"Exercise & Physical Activity: Your Everyday Guide\" from [employer]. Call 9-481.831.6107 or search [employer] online. · You need 2159-8509 mg of calcium and 3207-2048 IU of vitamin D per day. It is possible to meet your calcium requirement with diet alone, but a vitamin D supplement is usually necessary to meet this goal.  · When exposed to the sun, use a sunscreen that protects against both UVA and UVB radiation with an SPF of 30 or greater. Reapply every 2 to 3 hours or after sweating, drying off with a towel, or swimming. · Always wear a seat belt when traveling in a car. Always wear a helmet when riding a bicycle or motorcycle.   Patient Education        Well Visit, Over 72: Care Instructions  Your Care Instructions     Physical exams can help you stay healthy. Your doctor has checked your overall health and may have suggested ways to take good care of yourself. He or she also may have recommended tests. At home, you can help prevent illness with healthy eating, regular exercise, and other steps. Follow-up care is a key part of your treatment and safety. Be sure to make and go to all appointments, and call your doctor if you are having problems. It's also a good idea to know your test results and keep a list of the medicines you take. How can you care for yourself at home? Reach and stay at a healthy weight. This will lower your risk for many problems, such as obesity, diabetes, heart disease, and high blood pressure. Get at least 30 minutes of exercise on most days of the week. Walking is a good choice. You also may want to do other activities, such as running, swimming, cycling, or playing tennis or team sports. Do not smoke. Smoking can make health problems worse. If you need help quitting, talk to your doctor about stop-smoking programs and medicines. These can increase your chances of quitting for good. Protect your skin from too much sun. When you're outdoors from 10 a.m. to 4 p.m., stay in the shade or cover up with clothing and a hat with a wide brim. Wear sunglasses that block UV rays. Even when it's cloudy, put broad-spectrum sunscreen (SPF 30 or higher) on any exposed skin. See a dentist one or two times a year for checkups and to have your teeth cleaned. Wear a seat belt in the car. Follow your doctor's advice about when to have certain tests. These tests can spot problems early. For men and women  Cholesterol. Your doctor will tell you how often to have this done based on your overall health and other things that can increase your risk for heart attack and stroke. Blood pressure. Have your blood pressure checked during a routine doctor visit.  Your doctor will tell you how often to check your blood pressure based on your age, your blood pressure results, and other factors. Diabetes. Ask your doctor whether you should have tests for diabetes. Vision. Experts recommend that you have yearly exams for glaucoma and other age-related eye problems. Hearing. Tell your doctor if you notice any change in your hearing. You can have tests to find out how well you hear. Colon cancer tests. Keep having colon cancer tests as your doctor recommends. You can have one of several types of tests. Heart attack and stroke risk. At least every 4 to 6 years, you should have your risk for heart attack and stroke assessed. Your doctor uses factors such as your age, blood pressure, cholesterol, and whether you smoke or have diabetes to show what your risk for a heart attack or stroke is over the next 10 years. Osteoporosis. Talk to your doctor about whether you should have a bone density test to find out whether you have thinning bones. Ask your doctor if you need to take a calcium plus vitamin D supplement. You may be able to get enough calcium and vitamin D through your diet. For women  Pap test and pelvic exam. You may no longer need a Pap test. Talk with your doctor about whether to stop or continue to have Pap tests. Breast exam and mammogram. Ask how often you should have a mammogram, which is an X-ray of your breasts. A mammogram can spot breast cancer before it can be felt and when it is easiest to treat. Thyroid disease. Talk to your doctor about whether to have your thyroid checked as part of a regular physical exam. Women have an increased chance of a thyroid problem. For men  Prostate exam. Talk to your doctor about whether you should have a blood test (called a PSA test) for prostate cancer. Experts recommend that you discuss the benefits and risks of the test with your doctor before you decide whether to have this test. Some experts say that men ages 79 and older no longer need testing. Abdominal aortic aneurysm.  Ask your doctor whether you should join a cessation program.  Some of the changes you feel when you first quit tobacco are uncomfortable. Your body will miss the nicotine at first, and you may feel short-tempered and grumpy. You may have trouble sleeping or concentrating. Medicine can help you deal with these symptoms. You may struggle with changing your smoking habits and rituals. The last step is the tricky one: Be prepared for the smoking urge to continue for a time. This is a lot to deal with, but keep at it. You will feel better. Follow-up care is a key part of your treatment and safety. Be sure to make and go to all appointments, and call your doctor if you are having problems. It's also a good idea to know your test results and keep a list of the medicines you take. How can you care for yourself at home? Ask your family, friends, and coworkers for support. You have a better chance of quitting if you have help and support. Join a support group, such as Nicotine Anonymous, for people who are trying to quit smoking. Consider signing up for a smoking cessation program, such as the American Lung Association's Freedom from Smoking program.  Get text messaging support. Go to the website at www.smokefree. gov to sign up for the [facility] program.  Set a quit date. Pick your date carefully so that it is not right in the middle of a big deadline or stressful time. Once you quit, do not even take a puff. Get rid of all ashtrays and lighters after your last cigarette. Clean your house and your clothes so that they do not smell of smoke. Learn how to be a nonsmoker. Think about ways you can avoid those things that make you reach for a cigarette. Avoid situations that put you at greatest risk for smoking. For some people, it is hard to have a drink with friends without smoking. For others, they might skip a coffee break with coworkers who smoke. Change your daily routine. Take a different route to work or eat a meal in a different place.   Cut down on stress. Calm yourself or release tension by doing an activity you enjoy, such as reading a book, taking a hot bath, or gardening. Talk to your doctor or pharmacist about nicotine replacement therapy, which replaces the nicotine in your body. You still get nicotine but you do not use tobacco. Nicotine replacement products help you slowly reduce the amount of nicotine you need. These products come in several forms, many of them available over-the-counter:  Nicotine patches  Nicotine gum and lozenges  Nicotine inhaler  Ask your doctor about bupropion (Wellbutrin) or varenicline (Chantix), which are prescription medicines. They do not contain nicotine. They help you by reducing withdrawal symptoms, such as stress and anxiety. Some people find hypnosis, acupuncture, and massage helpful for ending the smoking habit. Eat a healthy diet and get regular exercise. Having healthy habits will help your body move past its craving for nicotine. Be prepared to keep trying. Most people are not successful the first few times they try to quit. Do not get mad at yourself if you smoke again. Make a list of things you learned and think about when you want to try again, such as next week, next month, or next year. Where can you learn more? Go to https://[employer]le[employer]optionsXpress.[employer]-Pulse. org and sign in to your [employer] account. Enter D082 in the [facility] box to learn more about \"Stopping Smoking: Care Instructions. \"     If you do not have an account, please click on the \"Sign Up Now\" link. Current as of: March 12, 2020               Content Version: 12.5  © 2006-2020 Healthwise, Incorporated. Care instructions adapted under license by [facility] ([facility]). If you have questions about a medical condition or this instruction, always ask your healthcare professional. [place] [address] any warranty or liability for your use of this information.          Patient Education        Learning About Alcohol Use Disorder  What is alcohol use disorder? Alcohol use disorder means that a person drinks alcohol even though it causes harm to themselves or others. It can range from mild to severe. The more signs of this disorder you have, the more severe it may be. Moderate to severe alcohol use disorder is sometimes called addiction. People who have it may find it hard to control their use of alcohol. People who have this disorder may argue with others about how much they're drinking. Their job may be affected because of drinking. They may drink when it's dangerous or illegal, such as when they drive. They also may have a strong need, or craving, to drink. They may feel like they must drink just to get by. Their drinking may increase their risk of getting hurt or being in a car crash. Over time, drinking too much alcohol may cause health problems. These may include high blood pressure, liver problems, or problems with digestion. What are the signs? Maybe you've wondered about your alcohol habits, or how to tell if your drinking is becoming a problem. Here are some of the signs of alcohol use disorder. You may have it if you have two or more of the following signs: You drink larger amounts of alcohol than you ever meant to. Or you've been drinking for a longer time than you ever meant to. You can't cut down or control your use. Or you constantly wish you could cut down. You spend a lot of time getting or drinking alcohol or recovering from its effects. You have strong cravings for alcohol. You can no longer do your main jobs at work, at school, or at home. You keep drinking alcohol, even though your use hurts your relationships. You have stopped doing important activities because of your alcohol use. You drink alcohol in situations where doing so is dangerous. You keep drinking alcohol even though you know it's causing health problems.   You need more and more alcohol to get the same effect, or you get less effect from the same amount over time. This is called tolerance. You have uncomfortable symptoms when you stop drinking alcohol or use less. This is called withdrawal.  Alcohol use disorder can range from mild to severe. The more signs you have, the more severe the disorder may be. Moderate to severe alcohol use disorder is sometimes called addiction. You might not realize that your drinking is a problem. You might not drink large amounts when you drink. Or you might go for days or weeks between drinking episodes. But even if you don't drink very often, your drinking could still be harmful and put you at risk. How is alcohol use disorder treated? Getting help is up to you. But you don't have to do it alone. There are many people and kinds of treatments that can help. Treatment for alcohol use disorder can include:  Group therapy, one or more types of counseling, and alcohol education. Medicines that help to:  Reduce withdrawal symptoms and help you safely stop drinking. Reduce cravings for alcohol. Support groups. These groups include Alcoholics Anonymous and [employer] (Self-Management and Recovery Training). Some people are able to stop or cut back on drinking with help from a counselor. People who have moderate to severe alcohol use disorder may need medical treatment. They may need to stay in a hospital or treatment center. You may have a treatment team to help you. This team may include a psychologist or psychiatrist, counselors, doctors, social workers, nurses, and a . A  helps plan and manage your treatment. Follow-up care is a key part of your treatment and safety. Be sure to make and go to all appointments, and call your doctor if you are having problems. It's also a good idea to know your test results and keep a list of the medicines you take. Where can you learn more? Go to https://jean-claude.[employer]. org and sign in to your [employer] [address] account.  Enter [id number] in the Search Health Information box to learn more about \"Learning About Alcohol Use Disorder. \"     If you do not have an account, please click on the \"Sign Up Now\" link. Current as of: August 22, 2019               Content Version: 12.5  © 8623-6437 Healthwise, Incorporated. Care instructions adapted under license by [facility] ([facility]). If you have questions about a medical condition or this instruction, always ask your healthcare professional. [address] any warranty or liability for your use of this information. What is lung cancer screening? Lung cancer screening is a way in which doctors check the lungs for early signs of cancer in people who have no symptoms of lung cancer. A low-dose CT scan uses much less radiation than a normal CT scan and shows a more detailed image of the lungs than a standard X-ray. The goal of lung cancer screening is to find cancer early, before it has a chance to grow, spread, or cause problems. One large study found that smokers who were screened with low-dose CT scans were less likely to die of lung cancer than those who were screened with standard X-ray. Below is a summary of the things you need to know regarding screening for lung cancer with low-dose computed tomography (LDCT). This is a screening program that involves routine annual screening with LDCT studies of the lung. The LDCTs are done using low-dose radiation that is not thought to increase your cancer risk. If you have other serious medical conditions (other cancers, congestive heart failure) that limit your life expectancy to less than 10 years, you should not undergo lung cancer screening with LDCT. The chance is 20%-60% that the LDCT result will show abnormalities. This would require additional testing which could include repeat imaging or even invasive procedures. Most (about 95%) of \"abnormal\" LDCT results are false in the sense that no lung cancer is ultimately found.

## 2020-08-01 RX ORDER — ACETAMINOPHEN AND CODEINE PHOSPHATE 300; 30 MG/1; MG/1
1 TABLET ORAL 2 TIMES DAILY PRN
Qty: 60 TABLET | Refills: 0 | Status: SHIPPED | OUTPATIENT
Start: 2020-08-01 | End: 2020-09-03

## 2020-08-11 RX ORDER — CITALOPRAM 40 MG/1
TABLET ORAL
Qty: 30 TABLET | Refills: 1 | Status: SHIPPED | OUTPATIENT
Start: 2020-08-11 | End: 2020-10-07

## 2020-08-24 RX ORDER — NYSTATIN 100000 U/G
CREAM TOPICAL
Qty: 30 G | Refills: 5 | Status: SHIPPED | OUTPATIENT
Start: 2020-08-24 | End: 2021-11-01

## 2020-08-24 RX ORDER — NYSTATIN 100000 [USP'U]/G
POWDER TOPICAL
Qty: 60 G | Refills: 5 | Status: SHIPPED | OUTPATIENT
Start: 2020-08-24 | End: 2021-11-01

## 2020-08-26 ENCOUNTER — TELEPHONE (OUTPATIENT)
Dept: PRIMARY CARE CLINIC | Age: 75
End: 2020-08-26

## 2020-08-26 NOTE — TELEPHONE ENCOUNTER
Gabby Belle from [facility] calling to see if we received a fax asking for a new walker? Pt's broke and she is a high risk for falls. Asking that we fax it back asap. Please watch for it. It was faxed about 15 min ago. Thanks!

## 2020-08-26 NOTE — TELEPHONE ENCOUNTER
Pt calling and is requesting a new walker she states that her handles have broke off and is un usable. She does not know where she would like this to go. Please advise.

## 2020-08-27 ENCOUNTER — TELEPHONE (OUTPATIENT)
Dept: PRIMARY CARE CLINIC | Age: 75
End: 2020-08-27

## 2020-08-27 NOTE — TELEPHONE ENCOUNTER
Called pt to let her know that Script for walker was complete. Pt gave me representative [address] [place] [address] number from [employer]. Called Rose and faxed order for rosey.

## 2020-08-27 NOTE — TELEPHONE ENCOUNTER
Patient notified- Verbalized Understanding- order faxed to [facility]

## 2020-09-03 RX ORDER — ACETAMINOPHEN AND CODEINE PHOSPHATE 300; 30 MG/1; MG/1
TABLET ORAL
Qty: 60 TABLET | Refills: 0 | Status: SHIPPED | OUTPATIENT
Start: 2020-09-03 | End: 2020-10-01

## 2020-09-03 RX ORDER — CLONAZEPAM 0.5 MG/1
TABLET ORAL
Qty: 90 TABLET | Refills: 0 | Status: SHIPPED | OUTPATIENT
Start: 2020-09-03 | End: 2020-10-01

## 2020-09-03 RX ORDER — LEVOTHYROXINE SODIUM 0.05 MG/1
TABLET ORAL
Qty: 77 TABLET | Refills: 3 | Status: SHIPPED | OUTPATIENT
Start: 2020-09-03 | End: 2021-09-16

## 2020-09-03 RX ORDER — ALBUTEROL SULFATE 90 UG/1
1-2 AEROSOL, METERED RESPIRATORY (INHALATION) EVERY 4 HOURS PRN
Qty: 1 INHALER | Refills: 3 | Status: SHIPPED | OUTPATIENT
Start: 2020-09-03 | End: 2021-02-18

## 2020-09-10 RX ORDER — DOCUSATE SODIUM 100 MG
CAPSULE ORAL
Qty: 30 CAPSULE | Refills: 2 | Status: SHIPPED | OUTPATIENT
Start: 2020-09-10 | End: 2022-04-29

## 2020-09-24 ENCOUNTER — OFFICE VISIT (OUTPATIENT)
Dept: PRIMARY CARE CLINIC | Age: 75
End: 2020-09-24
Payer: COMMERCIAL

## 2020-09-24 ENCOUNTER — HOSPITAL ENCOUNTER (OUTPATIENT)
Age: 75
Setting detail: SPECIMEN
Discharge: HOME | End: 2020-09-24
Payer: COMMERCIAL

## 2020-09-24 VITALS
HEART RATE: 78 BPM | TEMPERATURE: 98.2 F | OXYGEN SATURATION: 98 % | WEIGHT: 154.2 LBS | DIASTOLIC BLOOD PRESSURE: 62 MMHG | SYSTOLIC BLOOD PRESSURE: 120 MMHG | BODY MASS INDEX: 30.12 KG/M2

## 2020-09-24 LAB
ALBUMIN SERPL-MCNC: 4.4 G/DL (ref 3.5–5.2)
ALBUMIN/GLOBULIN RATIO: 1.7 (ref 1–2.5)
ALP BLD-CCNC: 61 U/L (ref 35–104)
ALT SERPL-CCNC: 8 U/L (ref 5–33)
ANION GAP SERPL CALCULATED.3IONS-SCNC: 13 MMOL/L (ref 9–17)
AST SERPL-CCNC: 14 U/L
BILIRUB SERPL-MCNC: 0.27 MG/DL (ref 0.3–1.2)
BUN BLDV-MCNC: 12 MG/DL (ref 8–23)
BUN/CREAT BLD: ABNORMAL (ref 9–20)
CALCIUM SERPL-MCNC: 9.6 MG/DL (ref 8.6–10.4)
CHLORIDE BLD-SCNC: 95 MMOL/L (ref 98–107)
CO2: 25 MMOL/L (ref 20–31)
CREAT SERPL-MCNC: 0.9 MG/DL (ref 0.5–0.9)
GFR AFRICAN AMERICAN: >60 ML/MIN
GFR NON-AFRICAN AMERICAN: >60 ML/MIN
GFR SERPL CREATININE-BSD FRML MDRD: ABNORMAL ML/MIN/{1.73_M2}
GFR SERPL CREATININE-BSD FRML MDRD: ABNORMAL ML/MIN/{1.73_M2}
GLUCOSE BLD-MCNC: 86 MG/DL (ref 70–99)
POTASSIUM SERPL-SCNC: 4.4 MMOL/L (ref 3.7–5.3)
SODIUM BLD-SCNC: 133 MMOL/L (ref 135–144)
TOTAL PROTEIN: 7 G/DL (ref 6.4–8.3)

## 2020-09-24 PROCEDURE — G0008 ADMIN INFLUENZA VIRUS VAC: HCPCS | Performed by: FAMILY MEDICINE

## 2020-09-24 PROCEDURE — 90694 VACC AIIV4 NO PRSRV 0.5ML IM: CPT | Performed by: FAMILY MEDICINE

## 2020-09-24 PROCEDURE — 99213 OFFICE O/P EST LOW 20 MIN: CPT | Performed by: FAMILY MEDICINE

## 2020-09-24 ASSESSMENT — ENCOUNTER SYMPTOMS: BACK PAIN: 1

## 2020-09-24 NOTE — PROGRESS NOTES
Shira Mora a 76 y.o. female who presents today for her medical conditions/complaints as notedbelow. Chief Complaint   Patient presents with    Medication Check     3mth medication check    Injections     Flu vaccine         HPI:   HPI  Back pain Controlled with meds. [facility] makes her itch everywhere and gives her thrush. She cut it back to every other day. Gets lightheaded at times. Uses klonopin for tremors also  Usually more sx in the evening of lightheadedness. Hs a machine that keeps track of her meds. Doesn't eat much per spouse  2 liquor drinks per day: baileys Jamaican cream    They have to move because the apartment they are in doesn't allow smoking on the property.    LDL Cholesterol (mg/dL)   Date Value   07/31/2020 117       (goal LDL is <100)   AST (U/L)   Date Value   12/02/2019 14     ALT (U/L)   Date Value   12/02/2019 7     BUN (mg/dL)   Date Value   12/02/2019 10     BP Readings from Last 3 Encounters:   09/24/20 120/62   07/31/20 106/80   06/15/20 130/72          (goal 120/80)    Past Medical History:   Diagnosis Date    Alcoholism ([address] 10/20/2016    Anxiety     Cataract     Chronic pain     Depression     Hematochezia 4/7/2017    Hypothyroid     Osteoporosis     Peptic ulcer of duodenum     chronic    Schatzki's ring     Sessile rectal polyp 04/10/2017    multi tiny    Tobacco abuse     Tubular adenoma of colon 04/10/2017    x2      Past Surgical History:   Procedure Laterality Date    CARPAL TUNNEL RELEASE      CATARACT REMOVAL      COLONOSCOPY  04/10/2017    multi tiny sessile polyps; poor prep; tubular adenoma x 2    HERNIA REPAIR      HYSTERECTOMY      KYPHOSIS SURGERY  10/27/2016    kyphoplasty L1 and L5    AK COLSC FLX W/RMVL OF TUMOR POLYP LESION SNARE TQ N/A 4/10/2017    COLONOSCOPY POLYPECTOMY SNARE x2 with photos performed by Jsesika Fish MD at [address] [place] [address] EGD TRANSORAL BIOPSY SINGLE/MULTIPLE N/A 4/10/2017    EGD BIOPSY x2 with photos performed by Josh Sandhu MD at [address] ARTHROSCOPY      TUBAL LIGATION      UPPER GASTROINTESTINAL ENDOSCOPY  04/10/2017    schatzki's ring; chronic peptic duodenitis       Family History   Problem Relation Age of Onset    Heart Disease Mother     Asthma Mother     Heart Disease Father        Social History     Tobacco Use    Smoking status: Current Every Day Smoker     Packs/day: 1.00     Years: 65.00     Pack years: 65.00     Types: Cigarettes    Smokeless tobacco: Never Used   Substance Use Topics    Alcohol use: Yes     Alcohol/week: 7.0 standard drinks     Types: 7 Shots of liquor per week     Frequency: 4 or more times a week     Drinks per session: 1 or 2     Comment: lani with cherykeith (1/2 and 1/2) daily      Current Outpatient Medications   Medication Sig Dispense Refill    Fluticasone furoate-vilanterol (BREO ELLIPTA) 200-25 MCG/INH AEPB inhaler Inhale 1 puff into the lungs daily Daily prn 60 each 2    STOOL SOFTENER 100 MG capsule TAKE ONE CAPSULE BY MOUTH DAILY 30 capsule 2    vitamin B-12 (CYANOCOBALAMIN) 1000 MCG tablet TAKE ONE TABLET BY MOUTH DAILY 30 tablet 3    levothyroxine (SYNTHROID) 50 MCG tablet TAKE ONE TABLET BY MOUTH DAILY - MONDAY THROUGH SATURDAY, SKIP SUNDAY 77 tablet 3    clonazePAM (KLONOPIN) 0.5 MG tablet TAKE ONE TABLET BY MOUTH THREE TIMES A DAY AS NEEDED 90 tablet 0    acetaminophen-codeine (TYLENOL #3) 300-30 MG per tablet TAKE ONE TABLET BY MOUTH TWICE A DAY AS NEEDED FOR PAIN FOR UP TO 30 DAYS 60 tablet 0    albuterol sulfate HFA (PROVENTIL HFA) 108 (90 Base) MCG/ACT inhaler Inhale 1-2 puffs into the lungs every 4 hours as needed for Wheezing or Shortness of Breath (Space out to every 6 hours as symptoms improve) 1 Inhaler 3    Misc. Devices (STEP N REST WALKER) MISC 1 each by Does not apply route continuous Seated, wheeled walker 1 each 0    nystatin (MYCOSTATIN) 694882 UNIT/GM cream Apply topically 3 times daily.  30 g 5    nystatin (MYCOSTATIN) 418841 UNIT/GM powder Apply 3 times daily. 60 g 5    citalopram (CELEXA) 40 MG tablet TAKE ONE TABLET BY MOUTH DAILY 30 tablet 1    meloxicam (MOBIC) 15 MG tablet TAKE ONE TABLET BY MOUTH DAILY 30 tablet 2    pantoprazole (PROTONIX) 40 MG tablet TAKE ONE TABLET BY MOUTH DAILY 90 tablet 1    Pseudoephedrine-DM-GG (SUDAFED COUGH PO) Take by mouth      ibuprofen (ADVIL;MOTRIN) 200 MG tablet Take 400 mg by mouth every 6 hours as needed for Pain      guaiFENesin (MUCINEX) 600 MG extended release tablet Take 1 tablet by mouth 2 times daily      denosumab (PROLIA) 60 MG/ML SOSY SC injection Inject 60 mg into the skin every 6 months Indications: next due Jan 2020      nystatin (MYCOSTATIN) 640112 UNIT/ML suspension Take 500,000 Units by mouth 4 times daily Indications: thrush Swoosh and swallow for thrush      polyvinyl alcohol-povidone (CLEAR EYES NATURAL TEARS) 5-6 MG/ML SOLN opthalmic solution Place 1 drop into both eyes as needed for Dry Eyes       folic acid (FOLVITE) 1 MG tablet Take 1 tablet by mouth daily 30 tablet 3    Vitamin D (CHOLECALCIFEROL) 1000 UNITS CAPS capsule Take 1,000 Units by mouth daily      Menthol (BIOFREEZE) 10 % AERO Apply topically daily as needed Indications: applies to back, shoulders and knees        No current facility-administered medications for this visit. Allergies   Allergen Reactions    Other      Seasonal allergies.      Bactrim [Sulfamethoxazole-Trimethoprim] Diarrhea and Nausea And Vomiting    Motrin [Ibuprofen] Diarrhea           Sulfa Antibiotics Diarrhea and Nausea And Vomiting    Wellbutrin [Bupropion] Other (See Comments)     Lightheaded, Dizziness       Health Maintenance   Topic Date Due    Hepatitis C screen  1945    Shingles Vaccine (1 of 2) 12/24/1995    DEXA (modify frequency per FRAX score)  12/24/2000    Low dose CT lung screening  02/28/2019    Colon cancer screen colonoscopy  04/10/2019    TSH testing  07/05/2020    Other chronic pain     2. Immunization due  INFLUENZA, QUADV, ADJUVANTED, 65 YRS =, IM, PF, PREFILL SYR, 0.5ML (FLUAD)        Plan:      Return in about 3 months (around 12/24/2020) for med check. She MUST cut back on alcohol, she needs to cut back to one per day and after that less. Discussed healthy eating. Orders Placed This Encounter   Procedures    INFLUENZA, QUADV, ADJUVANTED, 65 YRS =, IM, PF, PREFILL SYR, 0.5ML (FLUAD)     Orders Placed This Encounter   Medications    Fluticasone furoate-vilanterol (BREO ELLIPTA) 200-25 MCG/INH AEPB inhaler     Sig: Inhale 1 puff into the lungs daily Daily prn     Dispense:  60 each     Refill:  2       Patient given educational materials - see patient instructions. Discussed use, benefit, and side effects of prescribed medications. All patient questions answered. Pt voiced understanding. Reviewed health maintenance. Instructed to continue current medications, diet and exercise. Patient agreed with treatment plan. Follow up as directed.      Electronicallysigned by Jonathan Resendiz MD on 9/24/2020 at 4:33 PM

## 2020-09-24 NOTE — PATIENT INSTRUCTIONS
Patient Education        Influenza (Flu) Vaccine (Inactivated or Recombinant): What You Need to Know  Why get vaccinated? Influenza vaccine can prevent influenza (flu). Flu is a contagious disease that spreads around the [place] every year, usually between October and May. Anyone can get the flu, but it is more dangerous for some people. Infants and young children, people 72years of age and older, pregnant women, and people with certain health conditions or a weakened immune system are at greatest risk of flu complications. Pneumonia, bronchitis, sinus infections and ear infections are examples of flu-related complications. If you have a medical condition, such as heart disease, cancer or diabetes, flu can make it worse. Flu can cause fever and chills, sore throat, muscle aches, fatigue, cough, headache, and runny or stuffy nose. Some people may have vomiting and diarrhea, though this is more common in children than adults. Each year, thousands of people in the [facility] die from flu, and many more are hospitalized. Flu vaccine prevents millions of illnesses and flu-related visits to the doctor each year. Influenza vaccine  CDC recommends everyone 10months of age and older get vaccinated every flu season. Children 6 months through 6years of age may need 2 doses during a single flu season. Everyone else needs only 1 dose each flu season. It takes about 2 weeks for protection to develop after vaccination. There are many flu viruses, and they are always changing. Each year a new flu vaccine is made to protect against three or four viruses that are likely to cause disease in the upcoming flu season. Even when the vaccine doesn't exactly match these viruses, it may still provide some protection. Influenza vaccine does not cause flu. Influenza vaccine may be given at the same time as other vaccines.   Talk with your health care provider  Tell your vaccine provider if the person getting the vaccine:  · Has had an allergic reaction after a previous dose of influenza vaccine, or has any severe, life-threatening allergies. · Has ever had Guillain-Barré Syndrome (also called GBS). In some cases, your health care provider may decide to postpone influenza vaccination to a future visit. People with minor illnesses, such as a cold, may be vaccinated. People who are moderately or severely ill should usually wait until they recover before getting influenza vaccine. Your health care provider can give you more information. Risks of a vaccine reaction  · Soreness, redness, and swelling where shot is given, fever, muscle aches, and headache can happen after influenza vaccine. · There may be a very small increased risk of Guillain-Barré Syndrome (GBS) after inactivated influenza vaccine (the flu shot). [address] [place] [address] children who get the flu shot along with pneumococcal vaccine (PCV13), and/or DTaP vaccine at the same time might be slightly more likely to have a seizure caused by fever. Tell your health care provider if a child who is getting flu vaccine has ever had a seizure. People sometimes faint after medical procedures, including vaccination. Tell your provider if you feel dizzy or have vision changes or ringing in the ears. As with any medicine, there is a very remote chance of a vaccine causing a severe allergic reaction, other serious injury, or death. What if there is a serious problem? An allergic reaction could occur after the vaccinated person leaves the clinic. If you see signs of a severe allergic reaction (hives, swelling of the face and throat, difficulty breathing, a fast heartbeat, dizziness, or weakness), call 9-1-1 and get the person to the nearest hospital.  For other signs that concern you, call your health care provider. Adverse reactions should be reported to the Vaccine Adverse Event Reporting System (VAERS).  Your health care provider will usually file this report, or you can do it yourself. Visit the VAERS website at www.vaers. hhs.gov or call 6-311.311.3347. VAERS is only for reporting reactions, and VAERS staff do not give medical advice. The National Vaccine Injury Compensation Program  The National Vaccine Injury Compensation Program (VICP) is a federal program that was created to compensate people who may have been injured by certain vaccines. Visit the VICP website at www.hrsa.gov/vaccinecompensation or call 5-589.353.3517 to learn about the program and about filing a claim. There is a time limit to file a claim for compensation. How can I learn more? · Ask your healthcare provider. · Call your local or state health department. · Contact the Centers for Disease Control and Prevention (CDC):  ? Call 2-540.853.6612 (1-800-CDC-INFO) or  ? Visit CDC's website at www.cdc.gov/flu  Vaccine Information Statement (Interim)  Inactivated Influenza Vaccine  8/15/2019  [id number]  Department of Health and Human Services  Centers for Disease Control and Prevention  Many Vaccine Information Statements are available in Occitan and other languages. See www.immunize.org/vis. Muchas hojas de información sobre vacunas están disponibles en español y en otros idiomas. Visite www.immunize.org/vis. Care instructions adapted under license by [facility] If you have questions about a medical condition or this instruction, always ask your healthcare professional. Britney Purl any warranty or liability for your use of this information. Patient Education        Learning About Eating More Fruits and Vegetables  What are some quick tips for eating more fruits and vegetables? We're all encouraged to eat more fruits and vegetables. Yet it can seem like one more chore on the daily to-do list. But you can add color and crunch to your meals--and lots of nutrition--with these quick tips.   · Brighten up your breakfast.  ? Add sliced fruit or frozen berries to your yogurt, pancakes, or cereal.  ? Blend fresh or frozen fruit, veggies, and yogurt with a little fruit juice, and you've got a tasty smoothie. ? Make your scrambled eggs a gourmet treat by adding onions, celery, and bell peppers. ? Bake up some bran muffins with grated carrots added into the mix. · Make a livelier lunch. ? Jazz up tuna or chicken salad with apple chunks, celery, or grapes--or all of them! ? Add cucumbers, avocado slices, tomatoes, and lettuce to your sandwiches. ? Kick up the flavor of grilled cheese sandwiches with spinach and tomatoes. ? Puree some potatoes or squash to add to tomato soup. · Add delicious veggies to dinner. ? Give more color and taste to salads. Stir in red cabbage, carrots, and bell peppers. Top salads with dried cranberries or raisins. \"Frost\" your salad with orange sections or strawberries. ? Keep a bag or two of frozen vegetables ready to pull out of the freezer for a side dish. ? Spice up spaghetti and meatballs with mushrooms and bell peppers. ? Roast vegetables like cauliflower or squash in the oven with olive oil to bring out their flavor. ? Season your veggie dish with herbs like basil and rodrigo and a splash of lemon juice and olive oil. ? If you've got a main dish in the oven, stick in a potato to round out your meal.  · Grab some healthy snacks on the go. ? Scoop up an apple, banana, or plum for a quick snack. ? Cut up raw fruits and veggies to keep in your fridge. Grapes, oranges, carrots, and celery are great choices. They'll be ready for a quick snack or an after-school treat. ? Dip raw vegetables in hummus or peanut butter. ? Keep dried fruit on hand for an easy \"take with you\" snack. · Make something sweet--and healthy. ? Try baked apples or pears topped with cinnamon and honey for a delicious dessert. ? Make chocolate chip cookies even better with grated carrots added to the mix. Where can you learn more? Go to https://jean-claude.[facility]. org and sign in to your [employer] account. Enter F050 in the Rayku box to learn more about \"Learning About Eating More Fruits and Vegetables. \"     If you do not have an account, please click on the \"Sign Up Now\" link. Current as of: August 22, 2019               Content Version: 12.5  © 0926-7638 Healthwise, Incorporated. Care instructions adapted under license by [facility] ([facility]). If you have questions about a medical condition or this instruction, always ask your healthcare professional. [address] any warranty or liability for your use of this information.

## 2020-10-01 RX ORDER — ACETAMINOPHEN AND CODEINE PHOSPHATE 300; 30 MG/1; MG/1
TABLET ORAL
Qty: 60 TABLET | Refills: 0 | Status: SHIPPED | OUTPATIENT
Start: 2020-10-01 | End: 2020-10-28

## 2020-10-01 RX ORDER — CLONAZEPAM 0.5 MG/1
TABLET ORAL
Qty: 90 TABLET | Refills: 0 | Status: SHIPPED | OUTPATIENT
Start: 2020-10-01 | End: 2020-10-28

## 2020-10-21 ENCOUNTER — NURSE ONLY (OUTPATIENT)
Dept: PRIMARY CARE CLINIC | Age: 75
End: 2020-10-21
Payer: COMMERCIAL

## 2020-10-21 PROCEDURE — 96372 THER/PROPH/DIAG INJ SC/IM: CPT | Performed by: FAMILY MEDICINE

## 2020-10-21 NOTE — PROGRESS NOTES
After obtaining consent, and per orders of Dr. Payton Dover, injection of Prolia given in Right arm by Nav Townsend. Patient instructed to remain in clinic for 20 minutes afterwards, and to report any adverse reaction to me immediately.     Pt was also gave a handicap placcard today

## 2020-11-04 RX ORDER — MELOXICAM 15 MG/1
TABLET ORAL
Qty: 30 TABLET | Refills: 1 | Status: SHIPPED | OUTPATIENT
Start: 2020-11-04 | End: 2021-01-15

## 2020-11-30 ENCOUNTER — APPOINTMENT (OUTPATIENT)
Dept: GENERAL RADIOLOGY | Age: 75
End: 2020-11-30
Payer: COMMERCIAL

## 2020-11-30 ENCOUNTER — HOSPITAL ENCOUNTER (EMERGENCY)
Age: 75
Discharge: HOME | End: 2020-12-01
Attending: EMERGENCY MEDICINE
Payer: COMMERCIAL

## 2020-11-30 PROCEDURE — 73562 X-RAY EXAM OF KNEE 3: CPT

## 2020-11-30 PROCEDURE — 6370000000 HC RX 637 (ALT 250 FOR IP): Performed by: EMERGENCY MEDICINE

## 2020-11-30 PROCEDURE — 73590 X-RAY EXAM OF LOWER LEG: CPT

## 2020-11-30 PROCEDURE — 73610 X-RAY EXAM OF ANKLE: CPT

## 2020-11-30 PROCEDURE — 99283 EMERGENCY DEPT VISIT LOW MDM: CPT

## 2020-11-30 RX ORDER — ACETAMINOPHEN AND CODEINE PHOSPHATE 300; 30 MG/1; MG/1
TABLET ORAL
Qty: 60 TABLET | Refills: 0 | Status: SHIPPED | OUTPATIENT
Start: 2020-11-30 | End: 2020-12-31 | Stop reason: SDUPTHER

## 2020-11-30 RX ORDER — HYDROCODONE BITARTRATE AND ACETAMINOPHEN 5; 325 MG/1; MG/1
1 TABLET ORAL ONCE
Status: COMPLETED | OUTPATIENT
Start: 2020-11-30 | End: 2020-11-30

## 2020-11-30 RX ORDER — CLONAZEPAM 0.5 MG/1
TABLET ORAL
Qty: 90 TABLET | Refills: 0 | Status: SHIPPED | OUTPATIENT
Start: 2020-11-30 | End: 2020-12-31

## 2020-11-30 RX ADMIN — HYDROCODONE BITARTRATE AND ACETAMINOPHEN 1 TABLET: 5; 325 TABLET ORAL at 22:32

## 2020-11-30 ASSESSMENT — PAIN DESCRIPTION - PAIN TYPE: TYPE: ACUTE PAIN

## 2020-11-30 ASSESSMENT — PAIN SCALES - GENERAL
PAINLEVEL_OUTOF10: 8
PAINLEVEL_OUTOF10: 8

## 2020-11-30 ASSESSMENT — PAIN DESCRIPTION - LOCATION: LOCATION: ANKLE

## 2020-11-30 ASSESSMENT — PAIN DESCRIPTION - FREQUENCY: FREQUENCY: CONTINUOUS

## 2020-11-30 ASSESSMENT — ENCOUNTER SYMPTOMS
VOMITING: 0
COUGH: 0
DIARRHEA: 0
SHORTNESS OF BREATH: 0
BACK PAIN: 0
ABDOMINAL PAIN: 0

## 2020-11-30 ASSESSMENT — PAIN DESCRIPTION - ORIENTATION: ORIENTATION: LEFT

## 2020-12-01 VITALS
RESPIRATION RATE: 16 BRPM | SYSTOLIC BLOOD PRESSURE: 154 MMHG | DIASTOLIC BLOOD PRESSURE: 73 MMHG | HEIGHT: 61 IN | BODY MASS INDEX: 28.32 KG/M2 | HEART RATE: 66 BPM | WEIGHT: 150 LBS | TEMPERATURE: 97.8 F | OXYGEN SATURATION: 96 %
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
## 2020-12-01 NOTE — ED NOTES
Bed: 14  Expected date: 11/30/20  Expected time: 9:56 PM  Means of arrival: [facility]  Comments:  Fall ankle injury     Panda Richardson RN  11/30/20 [id number]

## 2020-12-01 NOTE — ED TRIAGE NOTES
Mode of arrival (squad #, walk in, police, etc) : [facility]        Chief complaint(s):Fall ankle injury       Arrival Note (brief scenario, treatment PTA, etc). : fell outside tonight with injury to left ankle Denies LOC       C= \"Have you ever felt that you should Cut down on your drinking? \"  No  A= \"Have people Annoyed you by criticizing your drinking? \"  No  G= \"Have you ever felt bad or Guilty about your drinking? \"  No  E= \"Have you ever had a drink as an Eye-opener first thing in the morning to steady your nerves or to help a hangover? \"  No      Deferred []      Reason for deferring: N/A    *If yes to two or more: probable alcohol abuse. *

## 2020-12-04 ENCOUNTER — TELEPHONE (OUTPATIENT)
Dept: PRIMARY CARE CLINIC | Age: 75
End: 2020-12-04

## 2020-12-04 NOTE — TELEPHONE ENCOUNTER
Pt having a lot of pain from left ankle due to fall. She is asking for permission to take T3 more frequently than BID. Pt states she has a med box and her nurse has a key to adjust frequency.

## 2020-12-04 NOTE — TELEPHONE ENCOUNTER
I would rather her not increase the narcotics.  Try extra strength tylenol ( 650 mg ) 4 hrs after the pain pills or 4 hrs prior to her pain pill : can take tylenol BID in addition to the pain pills

## 2020-12-07 ENCOUNTER — TELEPHONE (OUTPATIENT)
Dept: PRIMARY CARE CLINIC | Age: 75
End: 2020-12-07

## 2020-12-21 ENCOUNTER — TELEPHONE (OUTPATIENT)
Dept: PRIMARY CARE CLINIC | Age: 75
End: 2020-12-21

## 2020-12-31 RX ORDER — CITALOPRAM 40 MG/1
40 TABLET ORAL DAILY
Qty: 30 TABLET | Refills: 5 | Status: SHIPPED | OUTPATIENT
Start: 2020-12-31 | End: 2021-09-03 | Stop reason: SDUPTHER

## 2020-12-31 RX ORDER — CLONAZEPAM 0.5 MG/1
TABLET ORAL
Qty: 90 TABLET | Refills: 0 | Status: SHIPPED
Start: 2020-12-31 | End: 2021-01-04 | Stop reason: DRUGHIGH

## 2020-12-31 RX ORDER — ACETAMINOPHEN AND CODEINE PHOSPHATE 300; 30 MG/1; MG/1
1 TABLET ORAL 2 TIMES DAILY PRN
Qty: 60 TABLET | Refills: 0 | Status: SHIPPED | OUTPATIENT
Start: 2020-12-31 | End: 2021-01-21

## 2021-01-04 ENCOUNTER — OFFICE VISIT (OUTPATIENT)
Dept: PRIMARY CARE CLINIC | Age: 76
End: 2021-01-04
Payer: COMMERCIAL

## 2021-01-04 VITALS
HEART RATE: 73 BPM | SYSTOLIC BLOOD PRESSURE: 112 MMHG | TEMPERATURE: 98.2 F | WEIGHT: 148 LBS | BODY MASS INDEX: 27.94 KG/M2 | OXYGEN SATURATION: 91 % | DIASTOLIC BLOOD PRESSURE: 62 MMHG | HEIGHT: 61 IN

## 2021-01-04 DIAGNOSIS — E03.9 HYPOTHYROIDISM, UNSPECIFIED TYPE: ICD-10-CM

## 2021-01-04 DIAGNOSIS — M19.90 ARTHRITIS: Primary | ICD-10-CM

## 2021-01-04 DIAGNOSIS — G89.29 OTHER CHRONIC PAIN: ICD-10-CM

## 2021-01-04 DIAGNOSIS — R25.1 TREMOR: ICD-10-CM

## 2021-01-04 PROCEDURE — 99214 OFFICE O/P EST MOD 30 MIN: CPT | Performed by: FAMILY MEDICINE

## 2021-01-04 RX ORDER — CYCLOSPORINE 0.5 MG/ML
1 EMULSION OPHTHALMIC 2 TIMES DAILY
COMMUNITY
Start: 2020-12-14 | End: 2022-03-08

## 2021-01-04 RX ORDER — CLONAZEPAM 0.25 MG/1
0.25 TABLET, ORALLY DISINTEGRATING ORAL 3 TIMES DAILY PRN
Qty: 90 TABLET | Refills: 1 | Status: SHIPPED | OUTPATIENT
Start: 2021-01-04 | End: 2021-06-17

## 2021-01-04 SDOH — ECONOMIC STABILITY: INCOME INSECURITY: HOW HARD IS IT FOR YOU TO PAY FOR THE VERY BASICS LIKE FOOD, HOUSING, MEDICAL CARE, AND HEATING?: NOT VERY HARD

## 2021-01-04 SDOH — ECONOMIC STABILITY: FOOD INSECURITY: WITHIN THE PAST 12 MONTHS, YOU WORRIED THAT YOUR FOOD WOULD RUN OUT BEFORE YOU GOT MONEY TO BUY MORE.: NEVER TRUE

## 2021-01-04 SDOH — ECONOMIC STABILITY: FOOD INSECURITY: WITHIN THE PAST 12 MONTHS, THE FOOD YOU BOUGHT JUST DIDN'T LAST AND YOU DIDN'T HAVE MONEY TO GET MORE.: NEVER TRUE

## 2021-01-04 SDOH — ECONOMIC STABILITY: TRANSPORTATION INSECURITY
IN THE PAST 12 MONTHS, HAS LACK OF TRANSPORTATION KEPT YOU FROM MEETINGS, WORK, OR FROM GETTING THINGS NEEDED FOR DAILY LIVING?: NO

## 2021-01-04 ASSESSMENT — ENCOUNTER SYMPTOMS: BACK PAIN: 1

## 2021-01-04 NOTE — PROGRESS NOTES
Wanda Contreras a 76 y.o. female who presents today for her medical conditions/complaints as notedbelow. Chief Complaint   Patient presents with    Medication Check     3mth check         HPI:   HPI    Tripped in a parking lot and went to ER about 2 weeks ago. Laryngitis, ST for about a week, no cough, no fever. No GERD. Not eating, no apettite x 1 year. Lost 6 # since September. 9 # since June 2020  Drinks water and ginger ale  Drinks once [place] a day. Sleeps a lot.    She doesn't really eat    LDL Cholesterol (mg/dL)   Date Value   07/31/2020 117       (goal LDL is <100)   AST (U/L)   Date Value   09/24/2020 14     ALT (U/L)   Date Value   09/24/2020 8     BUN (mg/dL)   Date Value   09/24/2020 12     BP Readings from Last 3 Encounters:   01/04/21 112/62   12/01/20 (!) 154/73   09/24/20 120/62          (goal 120/80)    Past Medical History:   Diagnosis Date    Alcoholism ([facility] [address] 10/20/2016    Anxiety     Cataract     Chronic pain     Depression     Hematochezia 4/7/2017    Hypothyroid     Osteoporosis     Peptic ulcer of duodenum     chronic    Schatzki's ring     Sessile rectal polyp 04/10/2017    multi tiny    Tobacco abuse     Tubular adenoma of colon 04/10/2017    x2      Past Surgical History:   Procedure Laterality Date    CARPAL TUNNEL RELEASE      CATARACT REMOVAL      COLONOSCOPY  04/10/2017    multi tiny sessile polyps; poor prep; tubular adenoma x 2    HERNIA REPAIR      HYSTERECTOMY      KYPHOSIS SURGERY  10/27/2016    kyphoplasty L1 and L5    [place] COLSC FLX W/RMVL OF TUMOR POLYP LESION SNARE TQ N/A 4/10/2017    COLONOSCOPY POLYPECTOMY SNARE x2 with photos performed by Coleman Ernst MD at [address] EGD TRANSORAL BIOPSY SINGLE/MULTIPLE N/A 4/10/2017    EGD BIOPSY x2 with photos performed by Coleman Ernst MD at [address] [facility] ENDOSCOPY  04/10/2017    schatzki's ring; chronic peptic duodenitis Family History   Problem Relation Age of Onset    Heart Disease Mother     Asthma Mother     Heart Disease Father        Social History     Tobacco Use    Smoking status: Current Every Day Smoker     Packs/day: 1.00     Years: 65.00     Pack years: 65.00     Types: Cigarettes    Smokeless tobacco: Never Used   Substance Use Topics    Alcohol use: Yes     Alcohol/week: 7.0 standard drinks     Types: 7 Shots of liquor per week     Frequency: 4 or more times a week     Drinks per session: 1 or 2     Comment: lani with ilya (1/2 and 1/2) daily      Current Outpatient Medications   Medication Sig Dispense Refill    cycloSPORINE (RESTASIS) 0.05 % ophthalmic emulsion Apply 1 drop to eye 2 times daily      clonazePAM (KLONOPIN) 0.25 MG disintegrating tablet Take 1 tablet by mouth 3 times daily as needed (tremors) for up to 30 days. 90 tablet 1    citalopram (CELEXA) 40 MG tablet Take 1 tablet by mouth daily 30 tablet 5    acetaminophen-codeine (TYLENOL #3) 300-30 MG per tablet Take 1 tablet by mouth 2 times daily as needed for Pain for up to 30 days.  60 tablet 0    nystatin (MYCOSTATIN) 651278 UNIT/ML suspension Take 5 mLs by mouth 4 times daily Indications: thrush Swoosh and swallow for thrush 473 mL 0    meloxicam (MOBIC) 15 MG tablet TAKE ONE TABLET BY MOUTH DAILY 30 tablet 1    Handicap Placard MISC by Does not apply route Exp - 10/21/2025 1 each 0    BREO ELLIPTA 200-25 MCG/INH AEPB inhaler INHALE ONE DOSE BY MOUTH DAILY 60 each 2    STOOL SOFTENER 100 MG capsule TAKE ONE CAPSULE BY MOUTH DAILY 30 capsule 2    vitamin B-12 (CYANOCOBALAMIN) 1000 MCG tablet TAKE ONE TABLET BY MOUTH DAILY 30 tablet 3    levothyroxine (SYNTHROID) 50 MCG tablet TAKE ONE TABLET BY MOUTH DAILY - MONDAY THROUGH SATURDAY, SKIP SUNDAY 77 tablet 3    albuterol sulfate HFA (PROVENTIL HFA) 108 (90 Base) MCG/ACT inhaler Inhale 1-2 puffs into the lungs every 4 hours as needed for Wheezing or Shortness of Breath (Space out to every 6 hours as symptoms improve) 1 Inhaler 3    Misc. Devices (STEP N REST WALKER) MISC 1 each by Does not apply route continuous Seated, wheeled walker 1 each 0    nystatin (MYCOSTATIN) 710635 UNIT/GM cream Apply topically 3 times daily. 30 g 5    nystatin (MYCOSTATIN) 441403 UNIT/GM powder Apply 3 times daily. 60 g 5    pantoprazole (PROTONIX) 40 MG tablet TAKE ONE TABLET BY MOUTH DAILY 90 tablet 1    Pseudoephedrine-DM-GG (SUDAFED COUGH PO) Take by mouth      ibuprofen (ADVIL;MOTRIN) 200 MG tablet Take 400 mg by mouth every 6 hours as needed for Pain      guaiFENesin (MUCINEX) 600 MG extended release tablet Take 1 tablet by mouth 2 times daily      denosumab (PROLIA) 60 MG/ML SOSY SC injection Inject 60 mg into the skin every 6 months Indications: next due Jan 2020      polyvinyl alcohol-povidone (CLEAR EYES NATURAL TEARS) 5-6 MG/ML SOLN opthalmic solution Place 1 drop into both eyes as needed for Dry Eyes       Menthol (BIOFREEZE) 10 % AERO Apply topically daily as needed Indications: applies to back, shoulders and knees       folic acid (FOLVITE) 1 MG tablet Take 1 tablet by mouth daily 30 tablet 3    Vitamin D (CHOLECALCIFEROL) 1000 UNITS CAPS capsule Take 1,000 Units by mouth daily       No current facility-administered medications for this visit. Allergies   Allergen Reactions    Other      Seasonal allergies.      Bactrim [Sulfamethoxazole-Trimethoprim] Diarrhea and Nausea And Vomiting    Motrin [Ibuprofen] Diarrhea           Sulfa Antibiotics Diarrhea and Nausea And Vomiting    Wellbutrin [Bupropion] Other (See Comments)     Lightheaded, Dizziness       Health Maintenance   Topic Date Due    Hepatitis C screen  1945    Shingles Vaccine (1 of 2) 12/24/1995    DEXA (modify frequency per FRAX score)  12/24/2000    Low dose CT lung screening  02/28/2019    Colon cancer screen colonoscopy  04/10/2019    TSH testing  07/05/2020    Annual Wellness Visit (AWV)  08/01/2021    Lipid screen  07/31/2025    DTaP/Tdap/Td vaccine (3 - Td) 09/19/2027    Flu vaccine  Completed    Pneumococcal 65+ years Vaccine  Completed    Hepatitis A vaccine  Aged Out    Hepatitis B vaccine  Aged Out    Hib vaccine  Aged Out    Meningococcal (ACWY) vaccine  Aged Out       Subjective:      Review of Systems   Constitutional: Positive for fatigue. Musculoskeletal: Positive for arthralgias and back pain. Objective:     /62   Pulse 73   Temp 98.2 °F (36.8 °C)   Ht 5' 1\" (1.549 m)   Wt 148 lb (67.1 kg)   SpO2 91%   BMI 27.96 kg/m²   Physical Exam  Vitals signs and nursing note reviewed. Constitutional:       General: She is not in acute distress. Appearance: She is well-developed. She is not ill-appearing. HENT:      Head: Normocephalic and atraumatic. Right Ear: External ear normal.      Left Ear: External ear normal.   Eyes:      General: No scleral icterus. Right eye: No discharge. Left eye: No discharge. Conjunctiva/sclera: Conjunctivae normal.      Pupils: Pupils are equal, round, and reactive to light. Neck:      Thyroid: No thyromegaly. Trachea: No tracheal deviation. Cardiovascular:      Rate and Rhythm: Normal rate and regular rhythm. Heart sounds: Normal heart sounds. Pulmonary:      Effort: Pulmonary effort is normal. No respiratory distress. Breath sounds: Normal breath sounds. No wheezing. Musculoskeletal:         General: Swelling present. Comments: Left ankle Mild Swelling, no pain with ROM. No bruising  Uses wheeled walker. Lymphadenopathy:      Cervical: No cervical adenopathy. Skin:     General: Skin is warm. Findings: Rash present. Comments: Red dry rash in skin folds: panniculus and groin crease   Neurological:      Mental Status: She is alert and oriented to person, place, and time.    Psychiatric:         Mood and Affect: Mood normal.         Behavior: Behavior normal.         Thought Content: Thought content normal.         Assessment:       Diagnosis Orders   1. Arthritis     2. Other chronic pain     3. Hypothyroidism, unspecified type     4. Tremor  clonazePAM (KLONOPIN) 0.25 MG disintegrating tablet        Plan:      Return in about 3 months (around 4/4/2021). Try cutting back on klonopin to 1/2 the dose = 0.25 mg and see if she can stay awake more. Keep pain meds the same  She needs to try to drink supplements: ensure etc TID. Needs to try to eat more or take supplements  Was doing home PT  No orders of the defined types were placed in this encounter. Orders Placed This Encounter   Medications    clonazePAM (KLONOPIN) 0.25 MG disintegrating tablet     Sig: Take 1 tablet by mouth 3 times daily as needed (tremors) for up to 30 days. Dispense:  90 tablet     Refill:  1       Patient given educational materials - see patient instructions. Discussed use, benefit, and side effects of prescribed medications. All patient questions answered. Pt voiced understanding. Reviewed health maintenance. Instructed to continue current medications, diet and exercise. Patient agreed with treatment plan. Follow up as directed.      Electronicallysigned by Ellen Larose MD on 1/4/2021 at 3:10 PM

## 2021-01-06 RX ORDER — LANOLIN ALCOHOL/MO/W.PET/CERES
CREAM (GRAM) TOPICAL
Qty: 30 TABLET | Refills: 2 | Status: SHIPPED | OUTPATIENT
Start: 2021-01-06

## 2021-01-15 DIAGNOSIS — M19.90 ARTHRITIS: ICD-10-CM

## 2021-01-15 DIAGNOSIS — S32.000S COMPRESSION FRACTURE OF LUMBAR VERTEBRA, UNSPECIFIED LUMBAR VERTEBRAL LEVEL, SEQUELA: ICD-10-CM

## 2021-01-15 DIAGNOSIS — G89.29 OTHER CHRONIC PAIN: ICD-10-CM

## 2021-01-15 RX ORDER — MELOXICAM 15 MG/1
TABLET ORAL
Qty: 30 TABLET | Refills: 0 | Status: SHIPPED | OUTPATIENT
Start: 2021-01-15 | End: 2021-02-22

## 2021-01-21 DIAGNOSIS — G89.29 OTHER CHRONIC PAIN: ICD-10-CM

## 2021-01-21 DIAGNOSIS — S42.91XD CLOSED FRACTURE OF RIGHT SHOULDER WITH ROUTINE HEALING, SUBSEQUENT ENCOUNTER: ICD-10-CM

## 2021-01-21 DIAGNOSIS — S32.000S COMPRESSION FRACTURE OF LUMBAR VERTEBRA, UNSPECIFIED LUMBAR VERTEBRAL LEVEL, SEQUELA: ICD-10-CM

## 2021-01-21 DIAGNOSIS — M19.90 ARTHRITIS: ICD-10-CM

## 2021-01-21 RX ORDER — ACETAMINOPHEN AND CODEINE PHOSPHATE 300; 30 MG/1; MG/1
TABLET ORAL
Qty: 60 TABLET | Refills: 0 | Status: SHIPPED | OUTPATIENT
Start: 2021-01-21 | End: 2021-03-15

## 2021-02-18 RX ORDER — ALBUTEROL SULFATE 90 UG/1
AEROSOL, METERED RESPIRATORY (INHALATION)
Qty: 18 G | Refills: 2 | Status: SHIPPED | OUTPATIENT
Start: 2021-02-18 | End: 2021-07-14 | Stop reason: SDUPTHER

## 2021-03-15 DIAGNOSIS — S32.000S COMPRESSION FRACTURE OF LUMBAR VERTEBRA, UNSPECIFIED LUMBAR VERTEBRAL LEVEL, SEQUELA: ICD-10-CM

## 2021-03-15 DIAGNOSIS — G89.29 OTHER CHRONIC PAIN: ICD-10-CM

## 2021-03-15 DIAGNOSIS — M19.90 ARTHRITIS: ICD-10-CM

## 2021-03-15 DIAGNOSIS — S42.91XD CLOSED FRACTURE OF RIGHT SHOULDER WITH ROUTINE HEALING, SUBSEQUENT ENCOUNTER: ICD-10-CM

## 2021-03-15 RX ORDER — ACETAMINOPHEN AND CODEINE PHOSPHATE 300; 30 MG/1; MG/1
TABLET ORAL
Qty: 60 TABLET | Refills: 0 | Status: SHIPPED | OUTPATIENT
Start: 2021-03-15 | End: 2021-04-13 | Stop reason: SDUPTHER

## 2021-03-22 RX ORDER — PANTOPRAZOLE SODIUM 40 MG/1
TABLET, DELAYED RELEASE ORAL
Qty: 90 TABLET | Refills: 0 | Status: SHIPPED | OUTPATIENT
Start: 2021-03-22 | End: 2021-06-11

## 2021-03-24 ENCOUNTER — TELEPHONE (OUTPATIENT)
Dept: PRIMARY CARE CLINIC | Age: 76
End: 2021-03-24

## 2021-04-05 ENCOUNTER — HOSPITAL ENCOUNTER (OUTPATIENT)
Age: 76
Setting detail: SPECIMEN
Discharge: HOME | End: 2021-04-05
Payer: COMMERCIAL

## 2021-04-05 ENCOUNTER — OFFICE VISIT (OUTPATIENT)
Dept: PRIMARY CARE CLINIC | Age: 76
End: 2021-04-05
Payer: COMMERCIAL

## 2021-04-05 VITALS
OXYGEN SATURATION: 96 % | HEART RATE: 74 BPM | SYSTOLIC BLOOD PRESSURE: 118 MMHG | BODY MASS INDEX: 26.13 KG/M2 | TEMPERATURE: 98.5 F | HEIGHT: 61 IN | DIASTOLIC BLOOD PRESSURE: 74 MMHG | WEIGHT: 138.4 LBS

## 2021-04-05 DIAGNOSIS — R10.11 RUQ PAIN: ICD-10-CM

## 2021-04-05 DIAGNOSIS — R39.198 DIFFICULTY URINATING: Primary | ICD-10-CM

## 2021-04-05 DIAGNOSIS — R39.198 DIFFICULTY URINATING: ICD-10-CM

## 2021-04-05 DIAGNOSIS — K80.20 GALLSTONES: ICD-10-CM

## 2021-04-05 LAB
BILIRUBIN, POC: NORMAL
BLOOD URINE, POC: NORMAL
CLARITY, POC: CLEAR
COLOR, POC: NORMAL
GLUCOSE URINE, POC: NORMAL
KETONES, POC: NORMAL
LEUKOCYTE EST, POC: NORMAL
NITRITE, POC: NORMAL
PH, POC: 5.5
PROTEIN, POC: NORMAL
SPECIFIC GRAVITY, POC: 1.01
UROBILINOGEN, POC: 0.2

## 2021-04-05 PROCEDURE — 99214 OFFICE O/P EST MOD 30 MIN: CPT | Performed by: FAMILY MEDICINE

## 2021-04-05 PROCEDURE — 81003 URINALYSIS AUTO W/O SCOPE: CPT | Performed by: FAMILY MEDICINE

## 2021-04-05 ASSESSMENT — ENCOUNTER SYMPTOMS
ABDOMINAL PAIN: 1
SHORTNESS OF BREATH: 1
EYES NEGATIVE: 1
CONSTIPATION: 1

## 2021-04-05 NOTE — PATIENT INSTRUCTIONS
Patient Education        Learning About Gallstones  What are gallstones? Gallstones are stones that form in the gallbladder. The gallbladder is a small sac located just under the liver. It stores bile released by the liver. Bile helps you digest fats. Gallstones can be smaller than a grain of sand or as large as a golf ball. Gallstones form when cholesterol and other things found in bile make stones. They can also form if the gallbladder doesn't empty as it should. Gallstones can also form in the common bile duct or cystic duct. These tubes carry bile from the gallbladder and the liver to the small intestine. What happens when you have gallstones? The progression of gallstones depends on whether you have symptoms. Most people with gallstones have no symptoms and do not need treatment. The most common problem caused by gallstones occurs when a gallstone blocks the cystic duct that drains the gallbladder. It often causes bouts of pain that come and go as the gallbladder contracts and expands. The bouts of pain are often severe and steady. The pain can last from 15 minutes to up to 6 hours. And the pain may get worse after a meal. Symptoms usually improve within a few days. If the pain is severe or if you have had gallbladder pain before, you may need to have your gallbladder removed. In rare cases, gallstones can cause pancreatitis, an inflammation of the pancreas. Gallstones back up the flow of digestive enzymes made by the pancreas. Pancreatitis may cause sudden, severe belly pain, loss of appetite, nausea and vomiting, and fever. What are the symptoms? Most people who have gallstones don't have symptoms. When symptoms occur, they can include:  · Pain in the pit of your stomach or in the upper right part of your belly. It may spread to your right upper back or shoulder blade area. · Pain that may come and go or be steady. It may get worse when you eat.   · Fever and chills, if a gallstone is blocking a bile duct and causing an infection. · Yellowing of your skin and the whites of your eyes. Pain can last 15 minutes to 24 hours. Continuous pain for 1 to 5 hours is common. The pain may begin at night and be severe enough to wake you. Pain often starts after eating food that is high in fat. The pain usually makes it hard to get comfortable. Moving around doesn't make the pain go away. How can you prevent gallstones? There is no sure way to prevent gallstones. But you can reduce your risk of forming gallstones that can cause symptoms. · Stay at a healthy weight. If you need to lose weight, do so slowly and sensibly. · Eat regular, balanced meals. · Be active, and exercise regularly. How are gallstones treated? · If you don't have symptoms, you probably don't need treatment. · For mild symptoms, your doctor may have you take pain medicine and wait to see if the pain goes away. · For severe pain or infection, or if you have more than one gallstone attack, your doctor may suggest surgery to have your gallbladder removed. The body works fine without a gallbladder. Follow-up care is a key part of your treatment and safety. Be sure to make and go to all appointments, and call your doctor if you are having problems. It's also a good idea to know your test results and keep a list of the medicines you take. Where can you learn more? Go to [email or web address] and sign in to your [employer] account. Enter T647 in the [facility] box to learn more about \"Learning About Gallstones. \"     If you do not have an account, please click on the \"Sign Up Now\" link. Current as of: April 15, 2020               Content Version: 12.8  © 1570-5140 Healthwise, [employer]. Care instructions adapted under license by [facility] ([facility]).  If you have questions about a medical condition or this instruction, always ask your healthcare professional. Macrina Gustafson disclaims any warranty or

## 2021-04-05 NOTE — PROGRESS NOTES
[facility] PRIMARY CARE  [address] [address]  Dept: [address] is a 76 y.o. female Established patient, who presents today for her medical conditions/complaintsas noted below. Chief Complaint   Patient presents with    Medication Check     3mth f/u, Med contract up to date    Constipation     Discuss    Other     Pt not urinating as she should x 1month. HPI:     HPI     Pt presented with chief complain of difficulty with urination and constipation. About one month ago, her urinary frequency reduced to one time per day. She notes she consumes plenty of water and has the urge to urinate, but is unable to. She denies pain or burning on urination. Pt also notes constipation that started a month ago. Since then, she has been having a bowel movement every 3-4 days. Her diet consists mainly of soups, and says she feels satiated quicker with meals and has been eating less. She denies blood in stool, change in caliber, and change in consistency. She does not have nausea or vomiting. She has been taking a stool softener and off and on is drinking prune juice.      Reviewed prior notes None  Reviewed previous Labs and Imaging    LDL Cholesterol (mg/dL)   Date Value   07/31/2020 117       (goal LDL is <100)   AST (U/L)   Date Value   09/24/2020 14     ALT (U/L)   Date Value   09/24/2020 8     BUN (mg/dL)   Date Value   09/24/2020 12     TSH (mIU/L)   Date Value   07/05/2019 1.13     BP Readings from Last 3 Encounters:   04/05/21 118/74   01/04/21 112/62   12/01/20 (!) 154/73          (goal 120/80)    Past Medical History:   Diagnosis Date    Alcoholism ([facility] [address] 10/20/2016    Anxiety     Cataract     Chronic pain     Depression     Hematochezia 4/7/2017    Hypothyroid     Osteoporosis     Peptic ulcer of duodenum     chronic    Schatzki's ring     Sessile rectal polyp 04/10/2017    multi tiny    Tobacco abuse     Tubular adenoma of colon 04/10/2017    x2      Past Surgical History:   Procedure Laterality Date    CARPAL TUNNEL RELEASE      CATARACT REMOVAL      COLONOSCOPY  04/10/2017    multi tiny sessile polyps; poor prep; tubular adenoma x 2    HERNIA REPAIR      HYSTERECTOMY      KYPHOSIS SURGERY  10/27/2016    kyphoplasty L1 and L5    [place] COLSC FLX W/RMVL OF TUMOR POLYP LESION SNARE TQ N/A 4/10/2017    COLONOSCOPY POLYPECTOMY SNARE x2 with photos performed by Letty Rose MD at [address] [place] [address] EGD TRANSORAL BIOPSY SINGLE/MULTIPLE N/A 4/10/2017    EGD BIOPSY x2 with photos performed by [facility] at [facility] ARTHROSCOPY      TUBAL LIGATION      UPPER GASTROINTESTINAL ENDOSCOPY  04/10/2017    schatzki's ring; chronic peptic duodenitis       Family History   Problem Relation Age of Onset    Heart Disease Mother     Asthma Mother     Heart Disease Father        Social History     Tobacco Use    Smoking status: Current Every Day Smoker     Packs/day: 1.00     Years: 65.00     Pack years: 65.00     Types: Cigarettes    Smokeless tobacco: Never Used   Substance Use Topics    Alcohol use: Yes     Alcohol/week: 7.0 standard drinks     Types: 7 Shots of liquor per week     Frequency: 4 or more times a week     Drinks per session: 1 or 2     Comment: lani with ilya (1/2 and 1/2) daily      Current Outpatient Medications   Medication Sig Dispense Refill    pantoprazole (PROTONIX) 40 MG tablet TAKE ONE TABLET BY MOUTH DAILY 90 tablet 0    acetaminophen-codeine (TYLENOL #3) 300-30 MG per tablet TAKE ONE TABLET BY MOUTH TWICE A DAY AS NEEDED FOR PAIN 60 tablet 0    meloxicam (MOBIC) 15 MG tablet TAKE ONE TABLET BY MOUTH DAILY 30 tablet 3    albuterol sulfate  (90 Base) MCG/ACT inhaler INHALE ONE TO TWO PUFFS BY MOUTH EVERY 4 HOURS AS NEEDED FOR WHEEZING OR SHORTNESS OF BREATH.  SPACE OUT TO EVERY 6 HOURS AS SYMPTOMS IMPROVE 18 g 2    vitamin B-12 (CYANOCOBALAMIN) 1000 MCG tablet TAKE ONE TABLET BY MOUTH DAILY 30 tablet 2    cycloSPORINE (RESTASIS) 0.05 % ophthalmic emulsion Apply 1 drop to eye 2 times daily      citalopram (CELEXA) 40 MG tablet Take 1 tablet by mouth daily 30 tablet 5    nystatin (MYCOSTATIN) 073319 UNIT/ML suspension Take 5 mLs by mouth 4 times daily Indications: thrush Swoosh and swallow for thrush 473 mL 0    Handicap Placard MISC by Does not apply route Exp - 10/21/2025 1 each 0    BREO ELLIPTA 200-25 MCG/INH AEPB inhaler INHALE ONE DOSE BY MOUTH DAILY 60 each 2    STOOL SOFTENER 100 MG capsule TAKE ONE CAPSULE BY MOUTH DAILY 30 capsule 2    levothyroxine (SYNTHROID) 50 MCG tablet TAKE ONE TABLET BY MOUTH DAILY - MONDAY THROUGH SATURDAY, SKIP SUNDAY 77 tablet 3    Misc. Devices (STEP N REST WALKER) MISC 1 each by Does not apply route continuous Seated, wheeled walker 1 each 0    nystatin (MYCOSTATIN) 410667 UNIT/GM cream Apply topically 3 times daily. 30 g 5    nystatin (MYCOSTATIN) 972941 UNIT/GM powder Apply 3 times daily. 60 g 5    Pseudoephedrine-DM-GG (SUDAFED COUGH PO) Take by mouth      ibuprofen (ADVIL;MOTRIN) 200 MG tablet Take 400 mg by mouth every 6 hours as needed for Pain      guaiFENesin (MUCINEX) 600 MG extended release tablet Take 1 tablet by mouth 2 times daily      denosumab (PROLIA) 60 MG/ML SOSY SC injection Inject 60 mg into the skin every 6 months Indications: next due Jan 2020      polyvinyl alcohol-povidone (CLEAR EYES NATURAL TEARS) 5-6 MG/ML SOLN opthalmic solution Place 1 drop into both eyes as needed for Dry Eyes       Menthol (BIOFREEZE) 10 % AERO Apply topically daily as needed Indications: applies to back, shoulders and knees       folic acid (FOLVITE) 1 MG tablet Take 1 tablet by mouth daily 30 tablet 3    Vitamin D (CHOLECALCIFEROL) 1000 UNITS CAPS capsule Take 1,000 Units by mouth daily      clonazePAM (KLONOPIN) 0.25 MG disintegrating tablet Take 1 tablet by mouth 3 times daily as needed (tremors) for up to 30 days.  90 tablet 1     No current facility-administered medications for this visit. Allergies   Allergen Reactions    Other      Seasonal allergies.  Bactrim [Sulfamethoxazole-Trimethoprim] Diarrhea and Nausea And Vomiting    Motrin [Ibuprofen] Diarrhea           Sulfa Antibiotics Diarrhea and Nausea And Vomiting    Wellbutrin [Bupropion] Other (See Comments)     Lightheaded, Dizziness       Health Maintenance   Topic Date Due    Hepatitis C screen  Never done    Shingles Vaccine (1 of 2) Never done    DEXA (modify frequency per FRAX score)  Never done    Low dose CT lung screening  02/28/2019    Colon cancer screen colonoscopy  04/10/2019    TSH testing  07/05/2020    Annual Wellness Visit (AWV)  08/01/2021    Lipid screen  07/31/2025    DTaP/Tdap/Td vaccine (3 - Td) 09/19/2027    Flu vaccine  Completed    Pneumococcal 65+ years Vaccine  Completed    COVID-19 Vaccine  Completed    Hepatitis A vaccine  Aged Out    Hepatitis B vaccine  Aged Out    Hib vaccine  Aged Out    Meningococcal (ACWY) vaccine  Aged Out       Subjective:      Review of Systems   Constitutional: Negative. HENT: Negative. Eyes: Negative. Respiratory: Positive for shortness of breath. Cardiovascular: Negative. Gastrointestinal: Positive for abdominal pain and constipation. Genitourinary: Positive for difficulty urinating, dysuria, enuresis, flank pain and urgency. she has cut back on her alcohol intake, one baileys efrem creme is lasting her 3 days and she is not mixing in [place] anymore. Objective:     /74   Pulse 74   Temp 98.5 °F (36.9 °C)   Ht 5' 1\" (1.549 m)   Wt 138 lb 6.4 oz (62.8 kg)   SpO2 96%   BMI 26.15 kg/m²   Physical Exam  Vitals signs and nursing note reviewed. Constitutional:       General: She is not in acute distress. Appearance: Normal appearance. She is well-developed. She is not ill-appearing. HENT:      Head: Normocephalic and atraumatic.       Right Ear: External ear normal. Left Ear: External ear normal.   Eyes:      General: No scleral icterus. Right eye: No discharge. Left eye: No discharge. Conjunctiva/sclera: Conjunctivae normal.   Neck:      Thyroid: No thyromegaly. Trachea: No tracheal deviation. Cardiovascular:      Rate and Rhythm: Normal rate and regular rhythm. Heart sounds: Normal heart sounds. Pulmonary:      Effort: Pulmonary effort is normal. No respiratory distress. Breath sounds: Wheezing present. Comments: Mild exp wheezing post lobes. Abdominal:      General: Abdomen is flat. Bowel sounds are decreased. Tenderness: There is generalized abdominal tenderness and tenderness in the right upper quadrant and right lower quadrant. There is no guarding or rebound. Positive signs include Mcdaniel's sign. Comments: Slightly tender on left side. Lymphadenopathy:      Cervical: No cervical adenopathy. Skin:     General: Skin is warm. Findings: No rash. Neurological:      Mental Status: She is alert and oriented to person, place, and time. Mental status is at baseline. Psychiatric:         Mood and Affect: Mood normal.         Behavior: Behavior normal.         Thought Content: Thought content normal.         Judgment: Judgment normal.         Assessment:       Diagnosis Orders   1. Difficulty urinating  POCT Urinalysis No Micro (Auto)    Culture, Urine   2. RUQ pain  Alphonse Greer MD, General Surgery, [place]   3. Gallstones  US GALLBLADDER RUQ    Annamaria Matthews MD, General Surgery, [address] [place] [address]:         Return in about 3 months (around 7/5/2021). Orders Placed This Encounter   Procedures    Culture, Urine     Standing Status:   Future     Standing Expiration Date:   4/5/2022     Order Specific Question:   Specify (ex-cath, midstream, cysto, etc)?      Answer:   MIDSTREAM    US GALLBLADDER RUQ     Standing Status:   Future     Standing Expiration Date:   4/5/2022   Jenae Saldivar Marisa Gamble MD, General Surgery, [place]     Referral Priority:   Routine     Referral Type:   Eval and Treat     Referral Reason:   Specialty Services Required     Referred to Provider:   Jacob Alfaro MD     Requested Specialty:   General Surgery     Number of Visits Requested:   1    POCT Urinalysis No Micro (Auto)     No orders of the defined types were placed in this encounter. 1) Urinalysis with urine culture to check for UTI  2) KUB x-ray to assess urinary tract and GI tract  3) Gallbladder ultrasound for gallstones     Patient given educationalmaterials - see patient instructions. Discussed use, benefit, and side effectsof prescribed medications. All patient questions answered. Pt voiced understanding. Reviewed health maintenance. Instructed to continue current medications and diet. Patient agreed with treatment plan. Follow up as directed.      Electronicallysigned by Erica Dillon MD on 4/5/2021 at 5:18 PM

## 2021-04-06 DIAGNOSIS — N30.00 ACUTE CYSTITIS WITHOUT HEMATURIA: Primary | ICD-10-CM

## 2021-04-06 RX ORDER — AMOXICILLIN 250 MG/1
250 CAPSULE ORAL 3 TIMES DAILY
Qty: 21 CAPSULE | Refills: 0 | Status: SHIPPED | OUTPATIENT
Start: 2021-04-06 | End: 2021-07-16

## 2021-04-07 ENCOUNTER — HOSPITAL ENCOUNTER (OUTPATIENT)
Dept: ULTRASOUND IMAGING | Age: 76
Discharge: HOME | End: 2021-04-09
Payer: COMMERCIAL

## 2021-04-07 DIAGNOSIS — K80.20 GALLSTONES: ICD-10-CM

## 2021-04-07 DIAGNOSIS — R10.11 RUQ PAIN: ICD-10-CM

## 2021-04-07 LAB
CULTURE: ABNORMAL
Lab: ABNORMAL
SPECIMEN DESCRIPTION: ABNORMAL

## 2021-04-07 PROCEDURE — 76705 ECHO EXAM OF ABDOMEN: CPT

## 2021-04-13 ENCOUNTER — PROCEDURE VISIT (OUTPATIENT)
Dept: PRIMARY CARE CLINIC | Age: 76
End: 2021-04-13
Payer: COMMERCIAL

## 2021-04-13 DIAGNOSIS — S42.91XD CLOSED FRACTURE OF RIGHT SHOULDER WITH ROUTINE HEALING, SUBSEQUENT ENCOUNTER: ICD-10-CM

## 2021-04-13 DIAGNOSIS — M19.90 ARTHRITIS: ICD-10-CM

## 2021-04-13 DIAGNOSIS — Z00.00 ROUTINE GENERAL MEDICAL EXAMINATION AT A HEALTH CARE FACILITY: ICD-10-CM

## 2021-04-13 DIAGNOSIS — S32.000S COMPRESSION FRACTURE OF LUMBAR VERTEBRA, UNSPECIFIED LUMBAR VERTEBRAL LEVEL, SEQUELA: ICD-10-CM

## 2021-04-13 DIAGNOSIS — M81.0 AGE RELATED OSTEOPOROSIS, UNSPECIFIED PATHOLOGICAL FRACTURE PRESENCE: Primary | ICD-10-CM

## 2021-04-13 DIAGNOSIS — G89.29 OTHER CHRONIC PAIN: ICD-10-CM

## 2021-04-13 DIAGNOSIS — M81.0 MENOPAUSAL OSTEOPOROSIS: ICD-10-CM

## 2021-04-13 PROCEDURE — 77080 DXA BONE DENSITY AXIAL: CPT | Performed by: FAMILY MEDICINE

## 2021-04-13 RX ORDER — ACETAMINOPHEN AND CODEINE PHOSPHATE 300; 30 MG/1; MG/1
1 TABLET ORAL EVERY 8 HOURS PRN
Qty: 60 TABLET | Refills: 0 | Status: SHIPPED | OUTPATIENT
Start: 2021-04-13 | End: 2021-05-03

## 2021-04-16 ENCOUNTER — TELEPHONE (OUTPATIENT)
Dept: PRIMARY CARE CLINIC | Age: 76
End: 2021-04-16

## 2021-04-16 NOTE — TELEPHONE ENCOUNTER
Fax from [employer] company states she is overusing her albuterol  Please call patient and find out how often she is using the albuterol inhaler

## 2021-04-23 ENCOUNTER — TELEPHONE (OUTPATIENT)
Dept: PRIMARY CARE CLINIC | Age: 76
End: 2021-04-23

## 2021-04-23 NOTE — TELEPHONE ENCOUNTER
Pt called stating Dr. Aristeo Gaona needs clearance for surgery for gallbladder. I told her usually his office will fax over a clearance request and you will look over it and see if pt needs an appointment first or if clearance can be given.      Please advise if pt needs an apt for surgery clearance

## 2021-04-28 ENCOUNTER — OFFICE VISIT (OUTPATIENT)
Dept: PRIMARY CARE CLINIC | Age: 76
End: 2021-04-28
Payer: COMMERCIAL

## 2021-04-28 VITALS
HEART RATE: 74 BPM | SYSTOLIC BLOOD PRESSURE: 122 MMHG | OXYGEN SATURATION: 94 % | BODY MASS INDEX: 25.68 KG/M2 | HEIGHT: 61 IN | DIASTOLIC BLOOD PRESSURE: 78 MMHG | WEIGHT: 136 LBS

## 2021-04-28 DIAGNOSIS — K80.80 BILIARY CALCULUS OF OTHER SITE WITHOUT OBSTRUCTION: Primary | ICD-10-CM

## 2021-04-28 DIAGNOSIS — J44.9 CHRONIC OBSTRUCTIVE PULMONARY DISEASE, UNSPECIFIED COPD TYPE (HCC): ICD-10-CM

## 2021-04-28 PROCEDURE — 99214 OFFICE O/P EST MOD 30 MIN: CPT | Performed by: FAMILY MEDICINE

## 2021-04-28 ASSESSMENT — ENCOUNTER SYMPTOMS
BACK PAIN: 1
SHORTNESS OF BREATH: 1
COUGH: 1
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
## 2021-05-03 ENCOUNTER — HOSPITAL ENCOUNTER (OUTPATIENT)
Dept: PREADMISSION TESTING | Age: 76
Discharge: HOME | End: 2021-05-07
Payer: COMMERCIAL

## 2021-05-03 VITALS
TEMPERATURE: 97.9 F | RESPIRATION RATE: 18 BRPM | OXYGEN SATURATION: 95 % | WEIGHT: 138 LBS | HEART RATE: 96 BPM | BODY MASS INDEX: 26.06 KG/M2 | SYSTOLIC BLOOD PRESSURE: 145 MMHG | DIASTOLIC BLOOD PRESSURE: 81 MMHG | HEIGHT: 61 IN

## 2021-05-03 LAB
ABSOLUTE EOS #: 0.2 K/UL (ref 0–0.4)
ABSOLUTE IMMATURE GRANULOCYTE: ABNORMAL K/UL (ref 0–0.3)
ABSOLUTE LYMPH #: 1.4 K/UL (ref 1–4.8)
ABSOLUTE MONO #: 0.6 K/UL (ref 0.1–1.3)
ALBUMIN SERPL-MCNC: 4.7 G/DL (ref 3.5–5.2)
ALBUMIN/GLOBULIN RATIO: ABNORMAL (ref 1–2.5)
ALP BLD-CCNC: 71 U/L (ref 35–104)
ALT SERPL-CCNC: <5 U/L (ref 5–33)
AMYLASE: 87 U/L (ref 28–100)
ANION GAP SERPL CALCULATED.3IONS-SCNC: 13 MMOL/L (ref 9–17)
AST SERPL-CCNC: 19 U/L
BASOPHILS # BLD: 1 % (ref 0–2)
BASOPHILS ABSOLUTE: 0.1 K/UL (ref 0–0.2)
BILIRUB SERPL-MCNC: 0.36 MG/DL (ref 0.3–1.2)
BILIRUBIN DIRECT: 0.1 MG/DL
BILIRUBIN, INDIRECT: 0.26 MG/DL (ref 0–1)
BUN BLDV-MCNC: 10 MG/DL (ref 8–23)
BUN/CREAT BLD: ABNORMAL (ref 9–20)
CALCIUM SERPL-MCNC: 10 MG/DL (ref 8.6–10.4)
CHLORIDE BLD-SCNC: 89 MMOL/L (ref 98–107)
CO2: 27 MMOL/L (ref 20–31)
CREAT SERPL-MCNC: 0.76 MG/DL (ref 0.5–0.9)
DIFFERENTIAL TYPE: ABNORMAL
EOSINOPHILS RELATIVE PERCENT: 2 % (ref 0–4)
GFR AFRICAN AMERICAN: >60 ML/MIN
GFR NON-AFRICAN AMERICAN: >60 ML/MIN
GFR SERPL CREATININE-BSD FRML MDRD: ABNORMAL ML/MIN/{1.73_M2}
GFR SERPL CREATININE-BSD FRML MDRD: ABNORMAL ML/MIN/{1.73_M2}
GLOBULIN: ABNORMAL G/DL (ref 1.5–3.8)
GLUCOSE BLD-MCNC: 119 MG/DL (ref 70–99)
HCT VFR BLD CALC: 40 % (ref 36–46)
HEMOGLOBIN: 13.4 G/DL (ref 12–16)
IMMATURE GRANULOCYTES: ABNORMAL %
LIPASE: 90 U/L (ref 13–60)
LYMPHOCYTES # BLD: 16 % (ref 24–44)
MCH RBC QN AUTO: 30.5 PG (ref 26–34)
MCHC RBC AUTO-ENTMCNC: 33.6 G/DL (ref 31–37)
MCV RBC AUTO: 90.8 FL (ref 80–100)
MONOCYTES # BLD: 7 % (ref 1–7)
NRBC AUTOMATED: ABNORMAL PER 100 WBC
PDW BLD-RTO: 14.9 % (ref 11.5–14.9)
PLATELET # BLD: 377 K/UL (ref 150–450)
PLATELET ESTIMATE: ABNORMAL
PMV BLD AUTO: 7.1 FL (ref 6–12)
POTASSIUM SERPL-SCNC: 4.9 MMOL/L (ref 3.7–5.3)
RBC # BLD: 4.4 M/UL (ref 4–5.2)
RBC # BLD: ABNORMAL 10*6/UL
SEG NEUTROPHILS: 74 % (ref 36–66)
SEGMENTED NEUTROPHILS ABSOLUTE COUNT: 6.4 K/UL (ref 1.3–9.1)
SODIUM BLD-SCNC: 129 MMOL/L (ref 135–144)
TOTAL PROTEIN: 7.4 G/DL (ref 6.4–8.3)
WBC # BLD: 8.7 K/UL (ref 3.5–11)
WBC # BLD: ABNORMAL 10*3/UL

## 2021-05-03 PROCEDURE — 36415 COLL VENOUS BLD VENIPUNCTURE: CPT

## 2021-05-03 PROCEDURE — 80048 BASIC METABOLIC PNL TOTAL CA: CPT

## 2021-05-03 PROCEDURE — 80076 HEPATIC FUNCTION PANEL: CPT

## 2021-05-03 PROCEDURE — 93005 ELECTROCARDIOGRAM TRACING: CPT | Performed by: ANESTHESIOLOGY

## 2021-05-03 PROCEDURE — 83690 ASSAY OF LIPASE: CPT

## 2021-05-03 PROCEDURE — 82150 ASSAY OF AMYLASE: CPT

## 2021-05-03 PROCEDURE — 85025 COMPLETE CBC W/AUTO DIFF WBC: CPT

## 2021-05-03 RX ORDER — CALCIUM CARBONATE 500(1250)
500 TABLET ORAL DAILY
COMMUNITY
End: 2022-04-29

## 2021-05-03 NOTE — PROGRESS NOTES
Dr. Loly So, anesthesia, was contacted and informed of the patient's planned surgery, history, and unconfirmed abnormal EKG results from EKG done today in [facility]. Medical clearance required per Dr. Lennie Serrano office note and medical clearance already done per Dr. Vasquez No. Clearance in chart. No further orders with Dr. Loly So.

## 2021-05-03 NOTE — H&P
HISTORY and [address] [id number]       NAME:  Billy Mahmood  MRN: [id number]   YOB: 1945   Date: 5/3/2021   Age: 76 y.o. Gender: female       COMPLAINT AND PRESENT HISTORY:     Billy Mahmood is 76 y.o., female, undergoing preadmission testing for chronic cholecystitis with scheduled robotic laparoscopic cholecystectomy. Gallbladder ultrasound completed on 04/07/2021 showed cholelithiasis. Pt c/o RUQ abdominal and epigastric pain. Symptoms have been ongoing for quite some time. She reports she has been ignoring the symptoms but pain has been progressively worsening. The pain is aching and sharp in character, not related to meals or bowel movements. Pain is worse at night and does wake her from sleep. She is more comfortable in a recliner. The pain radiates to the right back and occasionally to the right shoulder. Pt denies intolerance to greasy and spicy foods. Pt denies nausea or vomiting. No diarrhea or constipation. Pt reports she occasionally has very light colored stool and at times, dark stools. Denies hematochezia. No fever or chills. Gallbladder ultrasound completed on 04/07/2021 showed cholelithiasis. No Hx of MRSA infections in the past.    Pt ambulates with cane typically but she is in a wheelchair today. Pt has chronic back pain, osteoporosis. History of arthritis to right shoulder with history of humeral fracture that occurred over one year ago after sustaining a fall. No surgical intervention at that time. History  Pt has home health aid for assistance at home. PMHx includes:     COPD: Pt reports occasional SOB with exertion. Pt is current, everyday smoker. Uses breo ellipta inhaler daily, albuterol inhaler prn. Denies cough, wheezing. Hypothyroid: Takes synthroid daily. Denies personal or family history of severe complications with anesthesia with past procedures. Pt did see PCP for medical clearance on 04/28/2021.      PAST MEDICAL HISTORY     Past Medical History:   Diagnosis Date    Alcoholism ([facility] [address] 10/20/2016    Anxiety     Cataract     Chronic pain     Depression     Hematochezia 4/7/2017    Hypothyroid     Osteoporosis     Peptic ulcer of duodenum     chronic    Schatzki's ring     Sessile rectal polyp 04/10/2017    multi tiny    Tobacco abuse     Tubular adenoma of colon 04/10/2017    x2     Pt denies any history of Diabetes mellitus type 2, hypertension, stroke, heart disease, COPD, Asthma, GERD, HLD, Cancer, Seizures, Kidney Disease, Hepatitis, TB or Substance abuse.     SURGICAL HISTORY       Past Surgical History:   Procedure Laterality Date    CARPAL TUNNEL RELEASE Right     CATARACT REMOVAL      COLONOSCOPY  04/10/2017    multi tiny sessile polyps; poor prep; tubular adenoma x 2    HERNIA REPAIR      HYSTERECTOMY      KYPHOSIS SURGERY  10/27/2016    kyphoplasty L1 and L5    [place] COLSC FLX W/RMVL OF TUMOR POLYP LESION SNARE TQ N/A 4/10/2017    COLONOSCOPY POLYPECTOMY SNARE x2 with photos performed by Colby Casey MD at [address] EGD TRANSORAL BIOPSY SINGLE/MULTIPLE N/A 4/10/2017    EGD BIOPSY x2 with photos performed by Colby Casey MD at [address] [address] ARTHROSCOPY Right     x 2    TUBAL LIGATION      UPPER GASTROINTESTINAL ENDOSCOPY  04/10/2017    schatzki's ring; chronic peptic duodenitis       FAMILY HISTORY       Family History   Problem Relation Age of Onset    Heart Disease Mother     Asthma Mother     Heart Disease Father        SOCIAL HISTORY       Social History     Socioeconomic History    Marital status:      Spouse name: None    Number of children: None    Years of education: None    Highest education level: None   Occupational History    None   Social Needs    Financial resource strain: Not very hard    Food insecurity     Worry: Never true     Inability: Never true    Transportation needs     Medical: No     Non-medical: No   Tobacco Use    Smoking status: Current Every Day Smoker Packs/day: 1.00     Years: 65.00     Pack years: 65.00     Types: Cigarettes    Smokeless tobacco: Never Used   Substance and Sexual Activity    Alcohol use: Yes     Alcohol/week: 7.0 standard drinks     Types: 7 Shots of liquor per week     Frequency: 4 or more times a week     Drinks per session: 1 or 2     Comment: baileys with kaluah (1/2 and 1/2) daily    Drug use: No    Sexual activity: Not Currently     Partners: Male   Lifestyle    Physical activity     Days per week: None     Minutes per session: None    Stress: None   Relationships    Social connections     Talks on phone: None     Gets together: None     Attends [employer] service: None     Active member of club or organization: None     Attends meetings of clubs or organizations: None     Relationship status: None    Intimate partner violence     Fear of current or ex partner: None     Emotionally abused: None     Physically abused: None     Forced sexual activity: None   Other Topics Concern    None   Social History Narrative    None        REVIEW OF SYSTEMS      Allergies   Allergen Reactions    Other      Seasonal allergies.  Bactrim [Sulfamethoxazole-Trimethoprim] Diarrhea and Nausea And Vomiting    Motrin [Ibuprofen] Diarrhea           Sulfa Antibiotics Diarrhea and Nausea And Vomiting    Wellbutrin [Bupropion] Other (See Comments)     Lightheaded, Dizziness       Current Outpatient Medications on File Prior to Encounter   Medication Sig Dispense Refill    calcium carbonate (OSCAL) 500 MG TABS tablet Take 500 mg by mouth daily      acetaminophen-codeine (TYLENOL #3) 300-30 MG per tablet Take 1 tablet by mouth every 8 hours as needed for Pain for up to 30 days.  60 tablet 0    pantoprazole (PROTONIX) 40 MG tablet TAKE ONE TABLET BY MOUTH DAILY 90 tablet 0    meloxicam (MOBIC) 15 MG tablet TAKE ONE TABLET BY MOUTH DAILY 30 tablet 3    albuterol sulfate  (90 Base) MCG/ACT inhaler INHALE ONE TO TWO PUFFS BY MOUTH EVERY 4 HOURS AS NEEDED FOR WHEEZING OR SHORTNESS OF BREATH. SPACE OUT TO EVERY 6 HOURS AS SYMPTOMS IMPROVE 18 g 2    vitamin B-12 (CYANOCOBALAMIN) 1000 MCG tablet TAKE ONE TABLET BY MOUTH DAILY 30 tablet 2    cycloSPORINE (RESTASIS) 0.05 % ophthalmic emulsion Apply 1 drop to eye 2 times daily      clonazePAM (KLONOPIN) 0.25 MG disintegrating tablet Take 1 tablet by mouth 3 times daily as needed (tremors) for up to 30 days. 90 tablet 1    citalopram (CELEXA) 40 MG tablet Take 1 tablet by mouth daily 30 tablet 5    nystatin (MYCOSTATIN) 578038 UNIT/ML suspension Take 5 mLs by mouth 4 times daily Indications: thrush Swoosh and swallow for thrush 473 mL 0    BREO ELLIPTA 200-25 MCG/INH AEPB inhaler INHALE ONE DOSE BY MOUTH DAILY 60 each 2    STOOL SOFTENER 100 MG capsule TAKE ONE CAPSULE BY MOUTH DAILY 30 capsule 2    levothyroxine (SYNTHROID) 50 MCG tablet TAKE ONE TABLET BY MOUTH DAILY - MONDAY THROUGH SATURDAY, SKIP SUNDAY 77 tablet 3    nystatin (MYCOSTATIN) 854255 UNIT/GM cream Apply topically 3 times daily. 30 g 5    nystatin (MYCOSTATIN) 420598 UNIT/GM powder Apply 3 times daily.  60 g 5    Pseudoephedrine-DM-GG (SUDAFED COUGH PO) Take by mouth as needed       ibuprofen (ADVIL;MOTRIN) 200 MG tablet Take 400 mg by mouth every 6 hours as needed for Pain      guaiFENesin (MUCINEX) 600 MG extended release tablet Take 1 tablet by mouth 2 times daily      denosumab (PROLIA) 60 MG/ML SOSY SC injection Inject 60 mg into the skin every 6 months Indications: next due Jan 2020      polyvinyl alcohol-povidone (CLEAR EYES NATURAL TEARS) 5-6 MG/ML SOLN opthalmic solution Place 1 drop into both eyes as needed for Dry Eyes       Menthol (BIOFREEZE) 10 % AERO Apply topically daily as needed Indications: applies to back, shoulders and knees       folic acid (FOLVITE) 1 MG tablet Take 1 tablet by mouth daily 30 tablet 3    Vitamin D (CHOLECALCIFEROL) 1000 UNITS CAPS capsule Take 1,000 Units by mouth daily      Handicap Placard MISC by Does not apply route Exp - 10/21/2025 1 each 0    Misc. Devices (STEP N REST WALKER) MISC 1 each by Does not apply route continuous Seated, wheeled walker 1 each 0     No current facility-administered medications on file prior to encounter. General health:  Fairly good. No fever or chills. Skin:  No itching, redness or rash. HEENT:  No headache, epistaxis or sore throat. Neck:  No pain, stiffness or masses. Cardiovascular/Respiratory system:  No chest pain, palpitation or shortness of breath. Gastrointestinal tract: See HPI. Genitourinary:  No burning on micturition. No hesitancy, urgency, frequency or discoloration of urine. Locomotor:  No bone or joint pains. No swelling. Neuropsychiatric:  No referable complaints. GENERAL PHYSICAL EXAM:     Vitals: BP (!) 145/81   Pulse 96   Temp 97.9 °F (36.6 °C)   Resp 18   Ht 5' 1\" (1.549 m)   Wt 138 lb (62.6 kg)   SpO2 95%   BMI 26.07 kg/m²  Body mass index is 26.07 kg/m². GENERAL APPEARANCE:   Lucy Barraza is 76 y.o. female, nourished, conscious, alert. Does not appear to be in distress or pain at this time. SKIN:  Warm, dry, no cyanosis or jaundice. HEAD:  Normocephalic, atraumatic. EYES:  Pupils equal, reactive to light. EARS:  No discharge, no marked hearing loss. NOSE:  No rhinorrhea, epistaxis or septal deformity. THROAT:  Mucus membranes moist. No erythema or exudate. NECK:  No stiffness, trachea central.  No palpable masses or L.N.                 CHEST:  Symmetrical and equal on expansion. HEART:  Hypertensive. RRR S1 > S2. No audible murmurs or gallops. LUNGS:  Equal on expansion, normal breath sounds. No wheezing, rhonchi or rales. ABDOMEN:  Soft on palpation. No localized tenderness. No guarding or rigidity. LOCOMOTOR, BACK AND SPINE:  Pt in wheelchair today. No deformities. EXTREMITIES:  Symmetrical, no pretibial edema. No calf tenderness. No discoloration or ulcerations. NEUROLOGIC:  The patient is conscious, alert, oriented. No facial drooping. Speech clear. No apparent focal sensory or motor deficits.                                                                                      PROVISIONAL DIAGNOSES / SURGERY:      CHRONIC CHOLECYSTITIS    CHOLECYSTECTOMY LAPAROSCOPIC ROBOTIC XI    Patient Active Problem List    Diagnosis Date Noted    Chest pain 12/02/2019    Fracture of right shoulder with routine healing 07/25/2019    Overflow incontinence 07/15/2019    Chronic bilateral low back pain without sciatica 01/08/2019    Closed fracture of multiple ribs of left side with routine healing 02/28/2018    Chronic obstructive pulmonary disease ([address] 08/25/2017    History of Helicobacter pylori infection 07/14/2017    Schatzki's ring     Peptic ulcer of duodenum     Electrolyte imbalance 04/24/2017    Sessile rectal polyp 04/10/2017    Tubular adenoma of colon 04/10/2017    Diarrhea 04/07/2017    Hyponatremia 04/07/2017    Hypokalemia 04/07/2017    Dysthymia 03/02/2017    Medication management 03/02/2017    Compression fx, lumbar spine ([address] 10/25/2016    Osteoporosis 10/25/2016    Compressed spine fracture ([address] 10/20/2016    Closed fracture of second lumbar vertebra ([address] 10/20/2016    Closed fracture of ninth thoracic vertebra ([address] 10/20/2016    Menopausal osteoporosis 10/20/2016    Alcoholism ([address] 10/20/2016    Other specified hypothyroidism 07/26/2016    Chronic pain 07/26/2016    Anorexia 10/16/2015    Arthritis 10/16/2015    Hypothyroidism 10/16/2015    Memory loss 10/16/2015    Menopause 10/16/2015    Muscle weakness 10/16/2015    Tremor 10/16/2015 GILDA York - CNP on 5/3/2021 at 3:35 PM

## 2021-05-03 NOTE — H&P (VIEW-ONLY)
HISTORY and [id number]       NAME:  Aric Evans  MRN: [id number]   YOB: 1945   Date: 5/3/2021   Age: 76 y.o. Gender: female       COMPLAINT AND PRESENT HISTORY:     Aric Evans is 76 y.o., female, undergoing preadmission testing for chronic cholecystitis with scheduled robotic laparoscopic cholecystectomy. Gallbladder ultrasound completed on 04/07/2021 showed cholelithiasis. Pt c/o RUQ abdominal and epigastric pain. Symptoms have been ongoing for quite some time. She reports she has been ignoring the symptoms but pain has been progressively worsening. The pain is aching and sharp in character, not related to meals or bowel movements. Pain is worse at night and does wake her from sleep. She is more comfortable in a recliner. The pain radiates to the right back and occasionally to the right shoulder. Pt denies intolerance to greasy and spicy foods. Pt denies nausea or vomiting. No diarrhea or constipation. Pt reports she occasionally has very light colored stool and at times, dark stools. Denies hematochezia. No fever or chills. Gallbladder ultrasound completed on 04/07/2021 showed cholelithiasis. No Hx of MRSA infections in the past.    Pt ambulates with cane typically but she is in a wheelchair today. Pt has chronic back pain, osteoporosis. History of arthritis to right shoulder with history of humeral fracture that occurred over one year ago after sustaining a fall. No surgical intervention at that time. History  Pt has home health aid for assistance at home. PMHx includes:     COPD: Pt reports occasional SOB with exertion. Pt is current, everyday smoker. Uses breo ellipta inhaler daily, albuterol inhaler prn. Denies cough, wheezing. Hypothyroid: Takes synthroid daily. Denies personal or family history of severe complications with anesthesia with past procedures. Pt did see PCP for medical clearance on 04/28/2021.      PAST MEDICAL HISTORY     Past Medical History:   Diagnosis Date    Alcoholism ([facility] [address] 10/20/2016    Anxiety     Cataract     Chronic pain     Depression     Hematochezia 4/7/2017    Hypothyroid     Osteoporosis     Peptic ulcer of duodenum     chronic    Schatzki's ring     Sessile rectal polyp 04/10/2017    multi tiny    Tobacco abuse     Tubular adenoma of colon 04/10/2017    x2     Pt denies any history of Diabetes mellitus type 2, hypertension, stroke, heart disease, COPD, Asthma, GERD, HLD, Cancer, Seizures, Kidney Disease, Hepatitis, TB or Substance abuse.     SURGICAL HISTORY       Past Surgical History:   Procedure Laterality Date    CARPAL TUNNEL RELEASE Right     CATARACT REMOVAL      COLONOSCOPY  04/10/2017    multi tiny sessile polyps; poor prep; tubular adenoma x 2    HERNIA REPAIR      HYSTERECTOMY      KYPHOSIS SURGERY  10/27/2016    kyphoplasty L1 and L5    [place] COLSC FLX W/RMVL OF TUMOR POLYP LESION SNARE TQ N/A 4/10/2017    COLONOSCOPY POLYPECTOMY SNARE x2 with photos performed by Marzena Larose MD at [address] [place] [address] EGD TRANSORAL BIOPSY SINGLE/MULTIPLE N/A 4/10/2017    EGD BIOPSY x2 with photos performed by Marzena Larose MD at [address] [address] ARTHROSCOPY Right     x 2    TUBAL LIGATION      UPPER GASTROINTESTINAL ENDOSCOPY  04/10/2017    schatzki's ring; chronic peptic duodenitis       FAMILY HISTORY       Family History   Problem Relation Age of Onset    Heart Disease Mother     Asthma Mother     Heart Disease Father        SOCIAL HISTORY       Social History     Socioeconomic History    Marital status:      Spouse name: None    Number of children: None    Years of education: None    Highest education level: None   Occupational History    None   Social Needs    Financial resource strain: Not very hard    Food insecurity     Worry: Never true     Inability: Never true    Transportation needs     Medical: No     Non-medical: No   Tobacco Use    Smoking status: Current Every Day Smoker Packs/day: 1.00     Years: 65.00     Pack years: 65.00     Types: Cigarettes    Smokeless tobacco: Never Used   Substance and Sexual Activity    Alcohol use: Yes     Alcohol/week: 7.0 standard drinks     Types: 7 Shots of liquor per week     Frequency: 4 or more times a week     Drinks per session: 1 or 2     Comment: baileys with kaluah (1/2 and 1/2) daily    Drug use: No    Sexual activity: Not Currently     Partners: Male   Lifestyle    Physical activity     Days per week: None     Minutes per session: None    Stress: None   Relationships    Social connections     Talks on phone: None     Gets together: None     Attends Jew service: None     Active member of club or organization: None     Attends meetings of clubs or organizations: None     Relationship status: None    Intimate partner violence     Fear of current or ex partner: None     Emotionally abused: None     Physically abused: None     Forced sexual activity: None   Other Topics Concern    None   Social History Narrative    None        REVIEW OF SYSTEMS      Allergies   Allergen Reactions    Other      Seasonal allergies.  Bactrim [Sulfamethoxazole-Trimethoprim] Diarrhea and Nausea And Vomiting    Motrin [Ibuprofen] Diarrhea           Sulfa Antibiotics Diarrhea and Nausea And Vomiting    Wellbutrin [Bupropion] Other (See Comments)     Lightheaded, Dizziness       Current Outpatient Medications on File Prior to Encounter   Medication Sig Dispense Refill    calcium carbonate (OSCAL) 500 MG TABS tablet Take 500 mg by mouth daily      acetaminophen-codeine (TYLENOL #3) 300-30 MG per tablet Take 1 tablet by mouth every 8 hours as needed for Pain for up to 30 days.  60 tablet 0    pantoprazole (PROTONIX) 40 MG tablet TAKE ONE TABLET BY MOUTH DAILY 90 tablet 0    meloxicam (MOBIC) 15 MG tablet TAKE ONE TABLET BY MOUTH DAILY 30 tablet 3    albuterol sulfate  (90 Base) MCG/ACT inhaler INHALE ONE TO TWO PUFFS BY MOUTH EVERY 4 Placard MISC by Does not apply route Exp - 10/21/2025 1 each 0    Misc. Devices (STEP N REST WALKER) MISC 1 each by Does not apply route continuous Seated, wheeled walker 1 each 0     No current facility-administered medications on file prior to encounter. General health:  Fairly good. No fever or chills. Skin:  No itching, redness or rash. HEENT:  No headache, epistaxis or sore throat. Neck:  No pain, stiffness or masses. Cardiovascular/Respiratory system:  No chest pain, palpitation or shortness of breath. Gastrointestinal tract: See HPI. Genitourinary:  No burning on micturition. No hesitancy, urgency, frequency or discoloration of urine. Locomotor:  No bone or joint pains. No swelling. Neuropsychiatric:  No referable complaints. GENERAL PHYSICAL EXAM:     Vitals: BP (!) 145/81   Pulse 96   Temp 97.9 °F (36.6 °C)   Resp 18   Ht 5' 1\" (1.549 m)   Wt 138 lb (62.6 kg)   SpO2 95%   BMI 26.07 kg/m²  Body mass index is 26.07 kg/m². GENERAL APPEARANCE:   Catherine Serum is 76 y.o. female, nourished, conscious, alert. Does not appear to be in distress or pain at this time. SKIN:  Warm, dry, no cyanosis or jaundice. HEAD:  Normocephalic, atraumatic. EYES:  Pupils equal, reactive to light. EARS:  No discharge, no marked hearing loss. NOSE:  No rhinorrhea, epistaxis or septal deformity. THROAT:  Mucus membranes moist. No erythema or exudate. NECK:  No stiffness, trachea central.  No palpable masses or L.N.                 CHEST:  Symmetrical and equal on expansion. HEART:  Hypertensive. RRR S1 > S2. No audible murmurs or gallops. LUNGS:  Equal on expansion, normal breath sounds. No wheezing, rhonchi or rales. ABDOMEN:  Soft on palpation. No localized tenderness. No guarding or rigidity. LOCOMOTOR, BACK AND SPINE:  Pt in wheelchair today. No deformities. EXTREMITIES:  Symmetrical, no pretibial edema. No calf tenderness. No discoloration or ulcerations. NEUROLOGIC:  The patient is conscious, alert, oriented. No facial drooping. Speech clear. No apparent focal sensory or motor deficits.                                                                                      PROVISIONAL DIAGNOSES / SURGERY:      CHRONIC CHOLECYSTITIS    CHOLECYSTECTOMY LAPAROSCOPIC ROBOTIC XI    Patient Active Problem List    Diagnosis Date Noted    Chest pain 12/02/2019    Fracture of right shoulder with routine healing 07/25/2019    Overflow incontinence 07/15/2019    Chronic bilateral low back pain without sciatica 01/08/2019    Closed fracture of multiple ribs of left side with routine healing 02/28/2018    Chronic obstructive pulmonary disease ([address] 08/25/2017    History of Helicobacter pylori infection 07/14/2017    Schatzki's ring     Peptic ulcer of duodenum     Electrolyte imbalance 04/24/2017    Sessile rectal polyp 04/10/2017    Tubular adenoma of colon 04/10/2017    Diarrhea 04/07/2017    Hyponatremia 04/07/2017    Hypokalemia 04/07/2017    Dysthymia 03/02/2017    Medication management 03/02/2017    Compression fx, lumbar spine ([address] 10/25/2016    Osteoporosis 10/25/2016    Compressed spine fracture ([address] 10/20/2016    Closed fracture of second lumbar vertebra ([address] 10/20/2016    Closed fracture of ninth thoracic vertebra ([address] 10/20/2016    Menopausal osteoporosis 10/20/2016    Alcoholism ([address] 10/20/2016    Other specified hypothyroidism 07/26/2016    Chronic pain 07/26/2016    Anorexia 10/16/2015    Arthritis 10/16/2015    Hypothyroidism 10/16/2015    Memory loss 10/16/2015    Menopause 10/16/2015    Muscle weakness 10/16/2015    Tremor 10/16/2015 Robert Edge, GILDA - CNP on 5/3/2021 at 3:35 PM

## 2021-05-04 LAB
EKG ATRIAL RATE: 92 BPM
EKG P AXIS: 75 DEGREES
EKG P-R INTERVAL: 152 MS
EKG Q-T INTERVAL: 368 MS
EKG QRS DURATION: 70 MS
EKG QTC CALCULATION (BAZETT): 455 MS
EKG R AXIS: 55 DEGREES
EKG T AXIS: 84 DEGREES
EKG VENTRICULAR RATE: 92 BPM

## 2021-05-04 PROCEDURE — 93010 ELECTROCARDIOGRAM REPORT: CPT | Performed by: INTERNAL MEDICINE

## 2021-05-06 ENCOUNTER — HOSPITAL ENCOUNTER (OUTPATIENT)
Dept: LAB | Age: 76
Setting detail: SPECIMEN
Discharge: HOME | End: 2021-05-06
Payer: COMMERCIAL

## 2021-05-06 DIAGNOSIS — Z01.818 PREOP TESTING: Primary | ICD-10-CM

## 2021-05-06 PROCEDURE — U0005 INFEC AGEN DETEC AMPLI PROBE: HCPCS

## 2021-05-06 PROCEDURE — U0003 INFECTIOUS AGENT DETECTION BY NUCLEIC ACID (DNA OR RNA); SEVERE ACUTE RESPIRATORY SYNDROME CORONAVIRUS 2 (SARS-COV-2) (CORONAVIRUS DISEASE [COVID-19]), AMPLIFIED PROBE TECHNIQUE, MAKING USE OF HIGH THROUGHPUT TECHNOLOGIES AS DESCRIBED BY CMS-2020-01-R: HCPCS

## 2021-05-07 ENCOUNTER — ANESTHESIA EVENT (OUTPATIENT)
Dept: OPERATING ROOM | Age: 76
End: 2021-05-07
Payer: COMMERCIAL

## 2021-05-07 LAB
SARS-COV-2: NORMAL
SARS-COV-2: NOT DETECTED
SOURCE: NORMAL

## 2021-05-10 ENCOUNTER — ANESTHESIA (OUTPATIENT)
Dept: OPERATING ROOM | Age: 76
End: 2021-05-10
Payer: COMMERCIAL

## 2021-05-10 ENCOUNTER — HOSPITAL ENCOUNTER (OUTPATIENT)
Age: 76
Setting detail: OUTPATIENT SURGERY
Discharge: HOME | End: 2021-05-10
Attending: SURGERY | Admitting: SURGERY
Payer: COMMERCIAL

## 2021-05-10 VITALS
BODY MASS INDEX: 26.06 KG/M2 | OXYGEN SATURATION: 94 % | DIASTOLIC BLOOD PRESSURE: 83 MMHG | SYSTOLIC BLOOD PRESSURE: 158 MMHG | RESPIRATION RATE: 20 BRPM | TEMPERATURE: 97.3 F | WEIGHT: 138 LBS | HEIGHT: 61 IN | HEART RATE: 66 BPM

## 2021-05-10 VITALS
RESPIRATION RATE: 15 BRPM | DIASTOLIC BLOOD PRESSURE: 88 MMHG | OXYGEN SATURATION: 100 % | TEMPERATURE: 97.2 F | SYSTOLIC BLOOD PRESSURE: 202 MMHG

## 2021-05-10 DIAGNOSIS — K81.1 CHRONIC CHOLECYSTITIS: Primary | ICD-10-CM

## 2021-05-10 PROCEDURE — 88304 TISSUE EXAM BY PATHOLOGIST: CPT

## 2021-05-10 PROCEDURE — 99999 PR OFFICE/OUTPT VISIT,PROCEDURE ONLY: CPT | Performed by: PHYSICIAN ASSISTANT

## 2021-05-10 PROCEDURE — 3700000001 HC ADD 15 MINUTES (ANESTHESIA): Performed by: SURGERY

## 2021-05-10 PROCEDURE — 7100000031 HC ASPR PHASE II RECOVERY - ADDTL 15 MIN: Performed by: SURGERY

## 2021-05-10 PROCEDURE — 6360000002 HC RX W HCPCS: Performed by: SURGERY

## 2021-05-10 PROCEDURE — 7100000030 HC ASPR PHASE II RECOVERY - FIRST 15 MIN: Performed by: SURGERY

## 2021-05-10 PROCEDURE — 2580000003 HC RX 258: Performed by: ANESTHESIOLOGY

## 2021-05-10 PROCEDURE — 2500000003 HC RX 250 WO HCPCS: Performed by: SURGERY

## 2021-05-10 PROCEDURE — 7100000010 HC PHASE II RECOVERY - FIRST 15 MIN: Performed by: SURGERY

## 2021-05-10 PROCEDURE — 6360000002 HC RX W HCPCS: Performed by: NURSE ANESTHETIST, CERTIFIED REGISTERED

## 2021-05-10 PROCEDURE — 2500000003 HC RX 250 WO HCPCS: Performed by: NURSE ANESTHETIST, CERTIFIED REGISTERED

## 2021-05-10 PROCEDURE — 7100000001 HC PACU RECOVERY - ADDTL 15 MIN: Performed by: SURGERY

## 2021-05-10 PROCEDURE — 3700000000 HC ANESTHESIA ATTENDED CARE: Performed by: SURGERY

## 2021-05-10 PROCEDURE — 6370000000 HC RX 637 (ALT 250 FOR IP): Performed by: ANESTHESIOLOGY

## 2021-05-10 PROCEDURE — 3600000019 HC SURGERY ROBOT ADDTL 15MIN: Performed by: SURGERY

## 2021-05-10 PROCEDURE — 7100000000 HC PACU RECOVERY - FIRST 15 MIN: Performed by: SURGERY

## 2021-05-10 PROCEDURE — 3600000009 HC SURGERY ROBOT BASE: Performed by: SURGERY

## 2021-05-10 PROCEDURE — 7100000011 HC PHASE II RECOVERY - ADDTL 15 MIN: Performed by: SURGERY

## 2021-05-10 PROCEDURE — 2709999900 HC NON-CHARGEABLE SUPPLY: Performed by: SURGERY

## 2021-05-10 PROCEDURE — S2900 ROBOTIC SURGICAL SYSTEM: HCPCS | Performed by: SURGERY

## 2021-05-10 RX ORDER — SODIUM CHLORIDE 0.9 % (FLUSH) 0.9 %
10 SYRINGE (ML) INJECTION EVERY 12 HOURS SCHEDULED
Status: DISCONTINUED | OUTPATIENT
Start: 2021-05-10 | End: 2021-05-10 | Stop reason: HOSPADM

## 2021-05-10 RX ORDER — BUPIVACAINE HYDROCHLORIDE 5 MG/ML
INJECTION, SOLUTION EPIDURAL; INTRACAUDAL PRN
Status: DISCONTINUED | OUTPATIENT
Start: 2021-05-10 | End: 2021-05-10 | Stop reason: ALTCHOICE

## 2021-05-10 RX ORDER — FENTANYL CITRATE 50 UG/ML
INJECTION, SOLUTION INTRAMUSCULAR; INTRAVENOUS PRN
Status: DISCONTINUED | OUTPATIENT
Start: 2021-05-10 | End: 2021-05-10 | Stop reason: SDUPTHER

## 2021-05-10 RX ORDER — ONDANSETRON 4 MG/1
TABLET, FILM COATED ORAL
Qty: 20 TABLET | Refills: 0 | Status: SHIPPED | OUTPATIENT
Start: 2021-05-10 | End: 2021-11-01

## 2021-05-10 RX ORDER — ONDANSETRON 2 MG/ML
INJECTION INTRAMUSCULAR; INTRAVENOUS PRN
Status: DISCONTINUED | OUTPATIENT
Start: 2021-05-10 | End: 2021-05-10 | Stop reason: SDUPTHER

## 2021-05-10 RX ORDER — LIDOCAINE HYDROCHLORIDE 10 MG/ML
1 INJECTION, SOLUTION EPIDURAL; INFILTRATION; INTRACAUDAL; PERINEURAL
Status: DISCONTINUED | OUTPATIENT
Start: 2021-05-10 | End: 2021-05-10 | Stop reason: HOSPADM

## 2021-05-10 RX ORDER — SODIUM CHLORIDE 0.9 % (FLUSH) 0.9 %
10 SYRINGE (ML) INJECTION PRN
Status: DISCONTINUED | OUTPATIENT
Start: 2021-05-10 | End: 2021-05-10 | Stop reason: HOSPADM

## 2021-05-10 RX ORDER — LABETALOL HYDROCHLORIDE 5 MG/ML
5 INJECTION, SOLUTION INTRAVENOUS EVERY 10 MIN PRN
Status: DISCONTINUED | OUTPATIENT
Start: 2021-05-10 | End: 2021-05-10 | Stop reason: HOSPADM

## 2021-05-10 RX ORDER — MORPHINE SULFATE 2 MG/ML
2 INJECTION, SOLUTION INTRAMUSCULAR; INTRAVENOUS EVERY 5 MIN PRN
Status: DISCONTINUED | OUTPATIENT
Start: 2021-05-10 | End: 2021-05-10 | Stop reason: HOSPADM

## 2021-05-10 RX ORDER — DEXAMETHASONE SODIUM PHOSPHATE 4 MG/ML
INJECTION, SOLUTION INTRA-ARTICULAR; INTRALESIONAL; INTRAMUSCULAR; INTRAVENOUS; SOFT TISSUE PRN
Status: DISCONTINUED | OUTPATIENT
Start: 2021-05-10 | End: 2021-05-10 | Stop reason: SDUPTHER

## 2021-05-10 RX ORDER — CEPHALEXIN 500 MG/1
CAPSULE ORAL
Qty: 21 CAPSULE | Refills: 0 | Status: SHIPPED | OUTPATIENT
Start: 2021-05-10 | End: 2021-06-16

## 2021-05-10 RX ORDER — PROPOFOL 10 MG/ML
INJECTION, EMULSION INTRAVENOUS PRN
Status: DISCONTINUED | OUTPATIENT
Start: 2021-05-10 | End: 2021-05-10 | Stop reason: SDUPTHER

## 2021-05-10 RX ORDER — ROCURONIUM BROMIDE 10 MG/ML
INJECTION, SOLUTION INTRAVENOUS PRN
Status: DISCONTINUED | OUTPATIENT
Start: 2021-05-10 | End: 2021-05-10 | Stop reason: SDUPTHER

## 2021-05-10 RX ORDER — PHENYLEPHRINE HYDROCHLORIDE 10 MG/ML
INJECTION INTRAVENOUS PRN
Status: DISCONTINUED | OUTPATIENT
Start: 2021-05-10 | End: 2021-05-10 | Stop reason: SDUPTHER

## 2021-05-10 RX ORDER — METOPROLOL TARTRATE 5 MG/5ML
INJECTION INTRAVENOUS PRN
Status: DISCONTINUED | OUTPATIENT
Start: 2021-05-10 | End: 2021-05-10 | Stop reason: SDUPTHER

## 2021-05-10 RX ORDER — SODIUM CHLORIDE 9 MG/ML
25 INJECTION, SOLUTION INTRAVENOUS PRN
Status: DISCONTINUED | OUTPATIENT
Start: 2021-05-10 | End: 2021-05-10 | Stop reason: HOSPADM

## 2021-05-10 RX ORDER — OXYCODONE HYDROCHLORIDE AND ACETAMINOPHEN 5; 325 MG/1; MG/1
1 TABLET ORAL EVERY 6 HOURS PRN
Qty: 28 TABLET | Refills: 0 | Status: SHIPPED | OUTPATIENT
Start: 2021-05-10 | End: 2021-05-17

## 2021-05-10 RX ORDER — SODIUM CHLORIDE, SODIUM LACTATE, POTASSIUM CHLORIDE, CALCIUM CHLORIDE 600; 310; 30; 20 MG/100ML; MG/100ML; MG/100ML; MG/100ML
INJECTION, SOLUTION INTRAVENOUS CONTINUOUS
Status: DISCONTINUED | OUTPATIENT
Start: 2021-05-10 | End: 2021-05-10 | Stop reason: HOSPADM

## 2021-05-10 RX ORDER — LIDOCAINE HYDROCHLORIDE 20 MG/ML
INJECTION, SOLUTION EPIDURAL; INFILTRATION; INTRACAUDAL; PERINEURAL PRN
Status: DISCONTINUED | OUTPATIENT
Start: 2021-05-10 | End: 2021-05-10 | Stop reason: SDUPTHER

## 2021-05-10 RX ORDER — MEPERIDINE HYDROCHLORIDE 25 MG/ML
12.5 INJECTION INTRAMUSCULAR; INTRAVENOUS; SUBCUTANEOUS EVERY 5 MIN PRN
Status: DISCONTINUED | OUTPATIENT
Start: 2021-05-10 | End: 2021-05-10 | Stop reason: HOSPADM

## 2021-05-10 RX ORDER — DIPHENHYDRAMINE HYDROCHLORIDE 50 MG/ML
12.5 INJECTION INTRAMUSCULAR; INTRAVENOUS
Status: DISCONTINUED | OUTPATIENT
Start: 2021-05-10 | End: 2021-05-10 | Stop reason: HOSPADM

## 2021-05-10 RX ORDER — OXYCODONE HYDROCHLORIDE AND ACETAMINOPHEN 5; 325 MG/1; MG/1
1 TABLET ORAL EVERY 4 HOURS PRN
Status: DISCONTINUED | OUTPATIENT
Start: 2021-05-10 | End: 2021-05-10 | Stop reason: HOSPADM

## 2021-05-10 RX ORDER — ONDANSETRON 2 MG/ML
4 INJECTION INTRAMUSCULAR; INTRAVENOUS
Status: DISCONTINUED | OUTPATIENT
Start: 2021-05-10 | End: 2021-05-10 | Stop reason: HOSPADM

## 2021-05-10 RX ADMIN — OXYCODONE HYDROCHLORIDE AND ACETAMINOPHEN 1 TABLET: 5; 325 TABLET ORAL at 14:05

## 2021-05-10 RX ADMIN — LIDOCAINE HYDROCHLORIDE 60 MG: 20 INJECTION, SOLUTION EPIDURAL; INFILTRATION; INTRACAUDAL; PERINEURAL at 11:32

## 2021-05-10 RX ADMIN — METOROPROLOL TARTRATE 2 MG: 5 INJECTION, SOLUTION INTRAVENOUS at 11:54

## 2021-05-10 RX ADMIN — SUGAMMADEX 200 MG: 100 INJECTION, SOLUTION INTRAVENOUS at 12:35

## 2021-05-10 RX ADMIN — ROCURONIUM BROMIDE 50 MG: 10 INJECTION, SOLUTION INTRAVENOUS at 11:32

## 2021-05-10 RX ADMIN — ROCURONIUM BROMIDE 10 MG: 10 INJECTION, SOLUTION INTRAVENOUS at 12:17

## 2021-05-10 RX ADMIN — CEFAZOLIN SODIUM 2000 MG: 10 INJECTION, POWDER, FOR SOLUTION INTRAVENOUS at 11:27

## 2021-05-10 RX ADMIN — PROPOFOL 150 MG: 10 INJECTION, EMULSION INTRAVENOUS at 11:32

## 2021-05-10 RX ADMIN — DEXAMETHASONE SODIUM PHOSPHATE 8 MG: 4 INJECTION, SOLUTION INTRAMUSCULAR; INTRAVENOUS at 12:18

## 2021-05-10 RX ADMIN — METOROPROLOL TARTRATE 1 MG: 5 INJECTION, SOLUTION INTRAVENOUS at 12:07

## 2021-05-10 RX ADMIN — FENTANYL CITRATE 50 MCG: 50 INJECTION, SOLUTION INTRAMUSCULAR; INTRAVENOUS at 11:36

## 2021-05-10 RX ADMIN — PHENYLEPHRINE HYDROCHLORIDE 100 MCG: 10 INJECTION INTRAVENOUS at 11:46

## 2021-05-10 RX ADMIN — SODIUM CHLORIDE, POTASSIUM CHLORIDE, SODIUM LACTATE AND CALCIUM CHLORIDE: 600; 310; 30; 20 INJECTION, SOLUTION INTRAVENOUS at 11:02

## 2021-05-10 RX ADMIN — SODIUM CHLORIDE, POTASSIUM CHLORIDE, SODIUM LACTATE AND CALCIUM CHLORIDE: 600; 310; 30; 20 INJECTION, SOLUTION INTRAVENOUS at 12:27

## 2021-05-10 RX ADMIN — FENTANYL CITRATE 25 MCG: 50 INJECTION, SOLUTION INTRAMUSCULAR; INTRAVENOUS at 11:32

## 2021-05-10 RX ADMIN — FENTANYL CITRATE 25 MCG: 50 INJECTION, SOLUTION INTRAMUSCULAR; INTRAVENOUS at 12:38

## 2021-05-10 RX ADMIN — ONDANSETRON 4 MG: 2 INJECTION, SOLUTION INTRAMUSCULAR; INTRAVENOUS at 12:18

## 2021-05-10 ASSESSMENT — PULMONARY FUNCTION TESTS
PIF_VALUE: 17
PIF_VALUE: 23
PIF_VALUE: 22
PIF_VALUE: 31
PIF_VALUE: 20
PIF_VALUE: 21
PIF_VALUE: 22
PIF_VALUE: 18
PIF_VALUE: 21
PIF_VALUE: 1
PIF_VALUE: 22
PIF_VALUE: 21
PIF_VALUE: 24
PIF_VALUE: 16
PIF_VALUE: 33
PIF_VALUE: 28
PIF_VALUE: 1
PIF_VALUE: 30
PIF_VALUE: 1
PIF_VALUE: 21
PIF_VALUE: 29
PIF_VALUE: 29
PIF_VALUE: 33
PIF_VALUE: 19
PIF_VALUE: 18
PIF_VALUE: 17
PIF_VALUE: 30
PIF_VALUE: 22
PIF_VALUE: 17
PIF_VALUE: 21
PIF_VALUE: 22
PIF_VALUE: 29
PIF_VALUE: 22
PIF_VALUE: 16
PIF_VALUE: 20
PIF_VALUE: 9
PIF_VALUE: 28
PIF_VALUE: 29
PIF_VALUE: 29
PIF_VALUE: 22
PIF_VALUE: 29
PIF_VALUE: 34
PIF_VALUE: 19
PIF_VALUE: 18
PIF_VALUE: 3
PIF_VALUE: 27
PIF_VALUE: 29
PIF_VALUE: 32

## 2021-05-10 ASSESSMENT — ENCOUNTER SYMPTOMS
SHORTNESS OF BREATH: 0
STRIDOR: 0

## 2021-05-10 ASSESSMENT — PAIN DESCRIPTION - LOCATION
LOCATION: ABDOMEN
LOCATION: ABDOMEN

## 2021-05-10 ASSESSMENT — PAIN SCALES - GENERAL
PAINLEVEL_OUTOF10: 8
PAINLEVEL_OUTOF10: 5
PAINLEVEL_OUTOF10: 0

## 2021-05-10 ASSESSMENT — PAIN - FUNCTIONAL ASSESSMENT: PAIN_FUNCTIONAL_ASSESSMENT: 0-10

## 2021-05-10 ASSESSMENT — PAIN DESCRIPTION - PAIN TYPE: TYPE: SURGICAL PAIN

## 2021-05-10 ASSESSMENT — LIFESTYLE VARIABLES: SMOKING_STATUS: 0

## 2021-05-10 ASSESSMENT — COPD QUESTIONNAIRES: CAT_SEVERITY: NO INTERVAL CHANGE

## 2021-05-10 NOTE — ANESTHESIA PRE PROCEDURE
Department of Anesthesiology  Preprocedure Note       Name:  Marlon Elkins   Age:  76 y.o.  :  1945                                          MRN:  [id number]         Date:  5/10/2021      Surgeon: Jazmyn Kendall):  Edmond Mccullough MD    Procedure: Procedure(s):  CHOLECYSTECTOMY LAPAROSCOPIC ROBOTIC XI    Medications prior to admission:   Prior to Admission medications    Medication Sig Start Date End Date Taking? Authorizing Provider   acetaminophen-codeine (TYLENOL #3) 300-30 MG per tablet TAKE ONE TABLET BY MOUTH EVERY 8 HOURS AS NEEDED FOR PAIN FOR UP TO 30 DAYS 5/3/21 6/2/21  Ruthann Carmen MD   calcium carbonate (OSCAL) 500 MG TABS tablet Take 500 mg by mouth daily    Historical Provider, MD   pantoprazole (PROTONIX) 40 MG tablet TAKE ONE TABLET BY MOUTH DAILY 3/22/21   Ruthann Carmen MD   meloxicam (MOBIC) 15 MG tablet TAKE ONE TABLET BY MOUTH DAILY 21   Ruthann Carmen MD   albuterol sulfate  (90 Base) MCG/ACT inhaler INHALE ONE TO TWO PUFFS BY MOUTH EVERY 4 HOURS AS NEEDED FOR WHEEZING OR SHORTNESS OF BREATH. SPACE OUT TO EVERY 6 HOURS AS SYMPTOMS IMPROVE 21   Ruthann Carmen MD   vitamin B-12 (CYANOCOBALAMIN) 1000 MCG tablet TAKE ONE TABLET BY MOUTH DAILY 21   Ruthann Carmen MD   cycloSPORINE (RESTASIS) 0.05 % ophthalmic emulsion Apply 1 drop to eye 2 times daily 20  Historical Provider, MD   clonazePAM (KLONOPIN) 0.25 MG disintegrating tablet Take 1 tablet by mouth 3 times daily as needed (tremors) for up to 30 days.  1/4/21 5/3/21  Ruthann Carmen MD   citalopram (CELEXA) 40 MG tablet Take 1 tablet by mouth daily 20   Ruthann Carmen MD   nystatin (MYCOSTATIN) 701027 UNIT/ML suspension Take 5 mLs by mouth 4 times daily Indications: thrush Swoosh and swallow for thrush 20   Ruthann Carmen MD   Handicap Placard MISC by Does not apply route Exp - 10/21/2025 10/21/20   Ruthann Carmen MD   BREO ELLIPTA 200-25 MCG/INH AEPB inhaler INHALE ONE DOSE BY MOUTH DAILY 10/19/20   Hayley Ulrich MD   STOOL SOFTENER 100 MG capsule TAKE ONE CAPSULE BY MOUTH DAILY 9/10/20   Hayley Ulrich MD   levothyroxine (SYNTHROID) 50 MCG tablet TAKE ONE TABLET BY MOUTH DAILY - [address], SKIP CHELSIE 9/3/20   Hayley Ulrich MD   Misc. Devices (STEP N REST WALKER) MISC 1 each by Does not apply route continuous Seated, wheeled walker 8/26/20   Hayley Ulrich MD   nystatin (MYCOSTATIN) 048821 UNIT/GM cream Apply topically 3 times daily. 8/24/20   Hayley Ulrich MD   nystatin (MYCOSTATIN) 091278 UNIT/GM powder Apply 3 times daily.  8/24/20   Hayley Ulrich MD   Pseudoephedrine-DM-GG (SUDAFED COUGH PO) Take by mouth as needed     Historical Provider, MD   ibuprofen (ADVIL;MOTRIN) 200 MG tablet Take 400 mg by mouth every 6 hours as needed for Pain    Historical Provider, MD   guaiFENesin (MUCINEX) 600 MG extended release tablet Take 1 tablet by mouth 2 times daily 12/3/19   Hayley Ulrich MD   denosumab (PROLIA) 60 MG/ML SOSY SC injection Inject 60 mg into the skin every 6 months Indications: next due Jan 2020    Historical Provider, MD   polyvinyl alcohol-povidone (CLEAR EYES NATURAL TEARS) 5-6 MG/ML SOLN opthalmic solution Place 1 drop into both eyes as needed for Dry Eyes     Historical Provider, MD   Menthol (BIOFREEZE) 10 % AERO Apply topically daily as needed Indications: applies to back, shoulders and knees     Historical Provider, MD   folic acid (FOLVITE) 1 MG tablet Take 1 tablet by mouth daily 9/9/19   Tawanna Aponte MD   Vitamin D (CHOLECALCIFEROL) 1000 UNITS CAPS capsule Take 1,000 Units by mouth daily    Historical Provider, MD       Current medications:    Current Facility-Administered Medications   Medication Dose Route Frequency Provider Last Rate Last Admin    lactated ringers infusion   Intravenous Continuous Melvin Kohli MD        sodium chloride flush 0.9 % injection 10 mL  10 mL Intravenous 2 times per day Melvin Kohli MD  sodium chloride flush 0.9 % injection 10 mL  10 mL Intravenous PRN Cherelle Santacruz MD        0.9 % sodium chloride infusion  25 mL Intravenous PRN Cherelle Santacruz MD        lidocaine PF 1 % injection 1 mL  1 mL Intradermal Once PRN Cherelle Santacruz MD        ceFAZolin (ANCEF) 2000 mg in dextrose 5 % 50 mL IVPB  2,000 mg Intravenous Once Mckenzie Casas MD           Allergies: Allergies   Allergen Reactions    Other      Seasonal allergies.  Bactrim [Sulfamethoxazole-Trimethoprim] Diarrhea and Nausea And Vomiting    Motrin [Ibuprofen] Diarrhea           Sulfa Antibiotics Diarrhea and Nausea And Vomiting    Wellbutrin [Bupropion] Other (See Comments)     Lightheaded, Dizziness       Problem List:    Patient Active Problem List   Diagnosis Code    Anorexia R63.0    Arthritis M19.90    Hypothyroidism E03.9    Memory loss R41.3    Menopause Z78.0    Muscle weakness M62.81    Tremor R25.1    Other specified hypothyroidism E03.8    Chronic pain G89.29    Compressed spine fracture ([address] M48.50XA    Closed fracture of second lumbar vertebra ([address] S32.029A    Closed fracture of ninth thoracic vertebra ([address] S22.079A    Menopausal osteoporosis M81.0    Alcoholism ([address] F10.20    Compression fx, lumbar spine ([address] S32.000A    Osteoporosis M81.0    Dysthymia F34.1    Medication management Z79.899    Diarrhea R19.7    Hyponatremia E87.1    Hypokalemia E87.6    Electrolyte imbalance E87.8    Sessile rectal polyp K62.1    Tubular adenoma of colon D12.6    Schatzki's ring K22.2    Peptic ulcer of duodenum K26.3    History of Helicobacter pylori infection Z86.19    Chronic obstructive pulmonary disease (HCC) J44.9    Closed fracture of multiple ribs of left side with routine healing S22.42XD    Chronic bilateral low back pain without sciatica M54.5, G89.29    Overflow incontinence N39.490    Fracture of right shoulder with routine healing S42. 91XD    Chest pain R07.9       Past Medical History:        Diagnosis Date    Alcoholism ([facility] 10/20/2016    Anxiety     Cataract     Chronic pain     Depression     Hematochezia 4/7/2017    Hypothyroid     Osteoporosis     Peptic ulcer of duodenum     chronic    Schatzki's ring     Sessile rectal polyp 04/10/2017    multi tiny    Tobacco abuse     Tubular adenoma of colon 04/10/2017    x2       Past Surgical History:        Procedure Laterality Date    CARPAL TUNNEL RELEASE Right     CATARACT REMOVAL      COLONOSCOPY  04/10/2017    multi tiny sessile polyps; poor prep; tubular adenoma x 2    HERNIA REPAIR      HYSTERECTOMY      KYPHOSIS SURGERY  10/27/2016    kyphoplasty L1 and L5    [place] COLSC FLX W/RMVL OF TUMOR POLYP LESION SNARE TQ N/A 4/10/2017    COLONOSCOPY POLYPECTOMY SNARE x2 with photos performed by Purvi Minor MD at [address] [place] [address] EGD TRANSORAL BIOPSY SINGLE/MULTIPLE N/A 4/10/2017    EGD BIOPSY x2 with photos performed by Purvi Minor MD at [place] [address] ARTHROSCOPY Right     x 2    TUBAL LIGATION      UPPER GASTROINTESTINAL ENDOSCOPY  04/10/2017    schatzki's ring; chronic peptic duodenitis       Social History:    Social History     Tobacco Use    Smoking status: Current Every Day Smoker     Packs/day: 1.00     Years: 65.00     Pack years: 65.00     Types: Cigarettes    Smokeless tobacco: Never Used   Substance Use Topics    Alcohol use:  Yes     Alcohol/week: 7.0 standard drinks     Types: 7 Shots of liquor per week     Frequency: 4 or more times a week     Drinks per session: 1 or 2     Comment: baileys with kaluah (1/2 and 1/2) daily                                Ready to quit: Not Answered  Counseling given: Not Answered      Vital Signs (Current):   Vitals:    05/10/21 1035   Weight: 138 lb (62.6 kg)   Height: 5' 1\" (1.549 m)                                              BP Readings from Last 3 Encounters:   05/03/21 (!) 145/81   04/28/21 122/78   04/05/21 118/74       NPO Status: wheezes, rales, stridor and not a current smoker          Patient did not smoke on day of surgery. Cardiovascular:Negative CV ROS  Exercise tolerance: good (>4 METS),       (-) pacemaker, hypertension, valvular problems/murmurs, past MI, CAD, CABG/stent, dysrhythmias,  angina,  CHF, orthopnea, PND,  EPPERSON, murmur, weak pulses,  friction rub, systolic click, carotid bruit,  JVD and peripheral edema    ECG reviewed  Rhythm: regular  Rate: normal           Beta Blocker:  Not on Beta Blocker         Neuro/Psych:   (+) psychiatric history:   (-) seizures, neuromuscular disease, TIA, CVA, headaches and depression/anxiety            GI/Hepatic/Renal:   (+) PUD,      (-) hiatal hernia, GERD, hepatitis, liver disease, no renal disease, bowel prep and no morbid obesity       Endo/Other:    (+) hypothyroidism::., no malignancy/cancer. (-) diabetes mellitus, hyperthyroidism, blood dyscrasia, arthritis, no electrolyte abnormalities, no malignancy/cancer               Abdominal:           Vascular: negative vascular ROS. - PVD, DVT and PE. Anesthesia Plan      general     ASA 3       Induction: intravenous. MIPS: Postoperative opioids intended and Prophylactic antiemetics administered. Anesthetic plan and risks discussed with patient. Plan discussed with CRNA. The patient was counseled at length about the risks of jm Covid-19 during their perioperative period and any recovery window from their procedure. The patient was made aware that jm Covid-19  may worsen their prognosis for recovering from their procedure  and lend to a higher morbidity and/or mortality risk. All material risks, benefits, and reasonable alternatives including postponing the procedure were discussed. The patient DOES wish to proceed with the procedure at this time.           Tex Anglin MD   5/10/2021

## 2021-05-11 LAB — SURGICAL PATHOLOGY REPORT: NORMAL

## 2021-05-14 ENCOUNTER — TELEPHONE (OUTPATIENT)
Dept: PRIMARY CARE CLINIC | Age: 76
End: 2021-05-14

## 2021-05-14 NOTE — TELEPHONE ENCOUNTER
Pt calling. States that she just had Gallstones removed with Dr. Lia Apgar. Since procedure, she has been itching all over. Pt is going to check with them and see if this is from the procedure. Advised her to call us back, if not able to get a hold of Dr. Lia Apgar, since it is [facility] and their office may close early.

## 2021-05-19 ENCOUNTER — TELEPHONE (OUTPATIENT)
Dept: PRIMARY CARE CLINIC | Age: 76
End: 2021-05-19

## 2021-05-19 NOTE — TELEPHONE ENCOUNTER
Renee Lynch called in this morning and asked if we can send an order to the department of aging or [place] for a nurse to come to her home. Patient states she needs assistance with her medications - she had a home health nurse previously but they have stopped coming.

## 2021-05-19 NOTE — TELEPHONE ENCOUNTER
ΣΑΡΑΝΤΙ with the area office on aging called in states Leon sent over forms so they can come out for med management but have not received anything back. She states [facility] will still be used for a nurse aid but due to being short staffed a different company needs to come out to set up pt's meds. Pt has been 2-3 weeks without any assistance with meds. Fax number for [employer] is . Ha [place] for referral to [facility] for med set up?

## 2021-05-25 ENCOUNTER — TELEPHONE (OUTPATIENT)
Dept: PRIMARY CARE CLINIC | Age: 76
End: 2021-05-25

## 2021-05-25 DIAGNOSIS — B37.0 THRUSH: Primary | ICD-10-CM

## 2021-05-25 NOTE — TELEPHONE ENCOUNTER
Sharon from [facility]Mentioned. Pt has thrush due to using the Breo inhaler. Asking if you would send in something for her to [facility] on [address] listed. Pt was advised to rinse her mouth out, after using her inhaler.

## 2021-05-26 ENCOUNTER — TELEPHONE (OUTPATIENT)
Dept: PRIMARY CARE CLINIC | Age: 76
End: 2021-05-26

## 2021-05-26 NOTE — TELEPHONE ENCOUNTER
Jacob Mirza from [facility] office on aging calling.  Needing a copy of pt's med list. Faxed to [phone]

## 2021-06-02 DIAGNOSIS — M19.90 ARTHRITIS: ICD-10-CM

## 2021-06-02 DIAGNOSIS — S32.000S COMPRESSION FRACTURE OF LUMBAR VERTEBRA, UNSPECIFIED LUMBAR VERTEBRAL LEVEL, SEQUELA: ICD-10-CM

## 2021-06-02 DIAGNOSIS — G89.29 OTHER CHRONIC PAIN: ICD-10-CM

## 2021-06-03 RX ORDER — MELOXICAM 15 MG/1
TABLET ORAL
Qty: 30 TABLET | Refills: 2 | Status: SHIPPED | OUTPATIENT
Start: 2021-06-03 | End: 2021-06-24 | Stop reason: SDUPTHER

## 2021-06-11 RX ORDER — PANTOPRAZOLE SODIUM 40 MG/1
TABLET, DELAYED RELEASE ORAL
Qty: 90 TABLET | Refills: 0 | Status: SHIPPED | OUTPATIENT
Start: 2021-06-11 | End: 2021-09-16

## 2021-06-16 ENCOUNTER — OFFICE VISIT (OUTPATIENT)
Dept: PRIMARY CARE CLINIC | Age: 76
End: 2021-06-16
Payer: COMMERCIAL

## 2021-06-16 ENCOUNTER — TELEPHONE (OUTPATIENT)
Dept: PRIMARY CARE CLINIC | Age: 76
End: 2021-06-16

## 2021-06-16 VITALS
OXYGEN SATURATION: 97 % | SYSTOLIC BLOOD PRESSURE: 138 MMHG | DIASTOLIC BLOOD PRESSURE: 84 MMHG | HEART RATE: 71 BPM | BODY MASS INDEX: 25.13 KG/M2 | WEIGHT: 133 LBS

## 2021-06-16 DIAGNOSIS — M19.90 ARTHRITIS: ICD-10-CM

## 2021-06-16 DIAGNOSIS — R45.851 SUICIDAL THOUGHTS: ICD-10-CM
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
## 2021-06-16 DIAGNOSIS — G89.29 OTHER CHRONIC PAIN: ICD-10-CM

## 2021-06-16 DIAGNOSIS — R25.1 TREMOR: ICD-10-CM

## 2021-06-16 DIAGNOSIS — E03.9 HYPOTHYROIDISM, UNSPECIFIED TYPE: ICD-10-CM

## 2021-06-16 DIAGNOSIS — Z79.899 ENCOUNTER FOR LONG-TERM (CURRENT) USE OF HIGH-RISK MEDICATION: ICD-10-CM

## 2021-06-16 DIAGNOSIS — S42.91XD CLOSED FRACTURE OF RIGHT SHOULDER WITH ROUTINE HEALING, SUBSEQUENT ENCOUNTER: ICD-10-CM

## 2021-06-16 DIAGNOSIS — S32.000S COMPRESSION FRACTURE OF LUMBAR VERTEBRA, UNSPECIFIED LUMBAR VERTEBRAL LEVEL, SEQUELA: ICD-10-CM

## 2021-06-16 DIAGNOSIS — F32.A DEPRESSION, UNSPECIFIED DEPRESSION TYPE: ICD-10-CM

## 2021-06-16 LAB
ALCOHOL URINE: NEGATIVE
AMPHETAMINE SCREEN, URINE: NEGATIVE
BARBITURATE SCREEN, URINE: NEGATIVE
BENZODIAZEPINE SCREEN, URINE: POSITIVE
BUPRENORPHINE URINE: NEGATIVE
COCAINE METABOLITE SCREEN URINE: NEGATIVE
FENTANYL SCREEN, URINE: NEGATIVE
GABAPENTIN SCREEN, URINE: NEGATIVE
MDMA URINE: NEGATIVE
METHADONE SCREEN, URINE: NEGATIVE
METHAMPHETAMINE, URINE: NEGATIVE
OPIATE SCREEN URINE: POSITIVE
OXYCODONE SCREEN URINE: NEGATIVE
PHENCYCLIDINE SCREEN URINE: NEGATIVE
PROPOXYPHENE SCREEN, URINE: NEGATIVE
SYNTHETIC CANNABINOIDS(K2) SCREEN, URINE: NEGATIVE
THC SCREEN, URINE: POSITIVE
TRAMADOL SCREEN URINE: NEGATIVE
TRICYCLIC ANTIDEPRESSANTS, UR: POSITIVE

## 2021-06-16 PROCEDURE — 99213 OFFICE O/P EST LOW 20 MIN: CPT | Performed by: PHYSICIAN ASSISTANT

## 2021-06-16 PROCEDURE — 80305 DRUG TEST PRSMV DIR OPT OBS: CPT | Performed by: PHYSICIAN ASSISTANT

## 2021-06-16 RX ORDER — CLONAZEPAM 0.25 MG/1
0.25 TABLET, ORALLY DISINTEGRATING ORAL 3 TIMES DAILY PRN
Qty: 90 TABLET | Refills: 1 | Status: CANCELLED | OUTPATIENT
Start: 2021-06-16 | End: 2021-07-16

## 2021-06-16 RX ORDER — TRIAMCINOLONE ACETONIDE 1 MG/G
CREAM TOPICAL
Qty: 45 G | Refills: 0 | Status: SHIPPED | OUTPATIENT
Start: 2021-06-16 | End: 2021-07-16

## 2021-06-16 RX ORDER — ACETAMINOPHEN AND CODEINE PHOSPHATE 300; 30 MG/1; MG/1
TABLET ORAL
Qty: 90 TABLET | Refills: 0 | Status: CANCELLED | OUTPATIENT
Start: 2021-06-16 | End: 2021-07-16

## 2021-06-16 ASSESSMENT — PATIENT HEALTH QUESTIONNAIRE - PHQ9
7. TROUBLE CONCENTRATING ON THINGS, SUCH AS READING THE NEWSPAPER OR WATCHING TELEVISION: 3
4. FEELING TIRED OR HAVING LITTLE ENERGY: 3
2. FEELING DOWN, DEPRESSED OR HOPELESS: 3
1. LITTLE INTEREST OR PLEASURE IN DOING THINGS: 3
8. MOVING OR SPEAKING SO SLOWLY THAT OTHER PEOPLE COULD HAVE NOTICED. OR THE OPPOSITE, BEING SO FIGETY OR RESTLESS THAT YOU HAVE BEEN MOVING AROUND A LOT MORE THAN USUAL: 3
SUM OF ALL RESPONSES TO PHQ QUESTIONS 1-9: 24
SUM OF ALL RESPONSES TO PHQ9 QUESTIONS 1 & 2: 6
9. THOUGHTS THAT YOU WOULD BE BETTER OFF DEAD, OR OF HURTING YOURSELF: 3
6. FEELING BAD ABOUT YOURSELF - OR THAT YOU ARE A FAILURE OR HAVE LET YOURSELF OR YOUR FAMILY DOWN: 3
10. IF YOU CHECKED OFF ANY PROBLEMS, HOW DIFFICULT HAVE THESE PROBLEMS MADE IT FOR YOU TO DO YOUR WORK, TAKE CARE OF THINGS AT HOME, OR GET ALONG WITH OTHER PEOPLE: 3
5. POOR APPETITE OR OVEREATING: 3
3. TROUBLE FALLING OR STAYING ASLEEP: 0
SUM OF ALL RESPONSES TO PHQ QUESTIONS 1-9: 21
SUM OF ALL RESPONSES TO PHQ QUESTIONS 1-9: 24

## 2021-06-16 ASSESSMENT — COLUMBIA-SUICIDE SEVERITY RATING SCALE - C-SSRS
2. HAVE YOU ACTUALLY HAD ANY THOUGHTS OF KILLING YOURSELF?: NO
1. WITHIN THE PAST MONTH, HAVE YOU WISHED YOU WERE DEAD OR WISHED YOU COULD GO TO SLEEP AND NOT WAKE UP?: YES
6. HAVE YOU EVER DONE ANYTHING, STARTED TO DO ANYTHING, OR PREPARED TO DO ANYTHING TO END YOUR LIFE?: NO

## 2021-06-16 NOTE — TELEPHONE ENCOUNTER
Dr. Tanja Valenzuela, I saw pt today and she was asking for these refills. However, her rapid drug screen was positive for THC, so I did not fill them. Pt said she does not use THC, so we sent the urine drug screen out, as well.

## 2021-06-16 NOTE — TELEPHONE ENCOUNTER
Pt called in stating she needed to make an appt asap to be seen. I asked what the reasoning was and she said she is very depressed, out of meds and wants to jump off a bridge and end it. I scheduled her for 4 pm today with Efrain Deleon and I told her to call 911 if she wants to hurt herself or others and she said I don't want to hurt anyone else I just want to end it. She states her spouse is not home. I called her daughter Nemesio Bee and asked if she could go over to pt's home and make sure she is ok and she said she could not assist because she is suicidal herself and doesn't even know her mom's address. When speaking with the pt I strongly urged her to go to ER or call 911 if she gets worse.

## 2021-06-16 NOTE — PROGRESS NOTES
[facility] PRIMARY CARE  [address] [address]  Dept: [address] is a 76 y.o. female Established patient, who presents today for her medical conditions/complaints as noted below. Chief Complaint   Patient presents with    Suicidal    Anorexia     pt is only eating one meal a day. HPI:     HPI   Pt is accompanied by her . Feeling depressed for about 1 month now. Having suicidal thoughts/wants to walk into a river, but says she won't act on her suicidal thoughts because she wants to be here for her . When I asked why she is depressed, pt says her apt has bed bugs and she and her  are being evicted.  states they have to pay for the , but they will still be evicted even if they pay for that. Says their last apt did not have bed bugs, so they think pt's aid must have brought bed bugs in. Washes linens in hot water daily, threw pillows away. Says she thinks she is still taking her Celexa. No appetite lately. Not sleeping well since she is getting bed bug bites at night and itching. She is taking Benadryl and using Cortizone cream for the itching. Says she only drinks 2 drinks of alcohol (Maria R's) per week. Taking 1000 IU Vit D and drinks milk. Needs Tylenol#3 refilled- Says she does take 3 tab per day most day for her right arm and back pain. Last dose of Tylenol#3 and Klonopin were today.    Med contract UTD from 9/24/20    Reviewed prior notes None  Reviewed previous Labs- Last urine drug screen from 6/15/20, T4 rom 6/15/20    LDL Cholesterol (mg/dL)   Date Value   07/31/2020 117       (goal LDL is <100)   AST (U/L)   Date Value   05/03/2021 19     ALT (U/L)   Date Value   05/03/2021 <5 (L)     BUN (mg/dL)   Date Value   05/03/2021 10     TSH (mIU/L)   Date Value   07/05/2019 1.13     BP Readings from Last 3 Encounters:   06/19/21 113/68   06/16/21 138/84 05/10/21 (!) 202/88          (goal 120/80)    Past Medical History:   Diagnosis Date    Alcoholism ([address] 10/20/2016    Anxiety     Cataract     Chronic pain     Depression     Hematochezia 4/7/2017    Hypothyroid     Osteoporosis     Peptic ulcer of duodenum     chronic    Schatzki's ring     Sessile rectal polyp 04/10/2017    multi tiny    Tobacco abuse     Tubular adenoma of colon 04/10/2017    x2      Past Surgical History:   Procedure Laterality Date    CARPAL TUNNEL RELEASE Right     CATARACT REMOVAL      CHOLECYSTECTOMY, LAPAROSCOPIC N/A 5/10/2021    CHOLECYSTECTOMY LAPAROSCOPIC ROBOTIC XI performed by Sunitha Torres MD at [address]  04/10/2017    multi tiny sessile polyps; poor prep; tubular adenoma x 2    HERNIA REPAIR      HYSTERECTOMY      KYPHOSIS SURGERY  10/27/2016    kyphoplasty L1 and L5    [place] COLSC FLX W/RMVL OF TUMOR POLYP LESION SNARE TQ N/A 4/10/2017    COLONOSCOPY POLYPECTOMY SNARE x2 with photos performed by Zan Mauricio MD at [address] [place] [address] EGD TRANSORAL BIOPSY SINGLE/MULTIPLE N/A 4/10/2017    EGD BIOPSY x2 with photos performed by Zan Mauricio MD at [facility] ARTHROSCOPY Right     x 2    TUBAL LIGATION      UPPER GASTROINTESTINAL ENDOSCOPY  04/10/2017    schatzki's ring; chronic peptic duodenitis       Family History   Problem Relation Age of Onset    Heart Disease Mother     Asthma Mother     Heart Disease Father        Social History     Tobacco Use    Smoking status: Current Every Day Smoker     Packs/day: 1.00     Years: 65.00     Pack years: 65.00     Types: Cigarettes    Smokeless tobacco: Never Used   Substance Use Topics    Alcohol use: Yes     Alcohol/week: 7.0 standard drinks     Types: 7 Shots of liquor per week     Comment: baileys with kaluah (1/2 and 1/2) daily      Current Outpatient Medications   Medication Sig Dispense Refill    triamcinolone (KENALOG) 0.1 % cream Apply topically 2 times daily.  45 g 0    pantoprazole SOLN opthalmic solution Place 1 drop into both eyes as needed for Dry Eyes       Menthol (BIOFREEZE) 10 % AERO Apply topically daily as needed Indications: applies to back, shoulders and knees       folic acid (FOLVITE) 1 MG tablet Take 1 tablet by mouth daily 30 tablet 3    Vitamin D (CHOLECALCIFEROL) 1000 UNITS CAPS capsule Take 1,000 Units by mouth daily      bacitracin-polymyxin b (POLYSPORIN) 500-97703 UNIT/GM ointment Apply topically 2 times daily. 15 g 1    acetaminophen-codeine (TYLENOL #3) 300-30 MG per tablet TAKE ONE TABLET BY MOUTH EVERY 8 HOURS AS NEEDED FOR PAIN 90 tablet 0    clonazePAM (KLONOPIN) 0.25 MG disintegrating tablet PLACE ONE TABLET BY MOUTH THREE TIMES A DAY AS NEEDED FOR TREMORS FOR UP TO 30 DAYS 90 tablet 0     No current facility-administered medications for this visit. Allergies   Allergen Reactions    Keflex [Cephalexin]      Diarrhea     Other      Seasonal allergies.  Bactrim [Sulfamethoxazole-Trimethoprim] Diarrhea and Nausea And Vomiting    Motrin [Ibuprofen] Diarrhea           Sulfa Antibiotics Diarrhea and Nausea And Vomiting    Wellbutrin [Bupropion] Other (See Comments)     Lightheaded, Dizziness       Health Maintenance   Topic Date Due    Hepatitis C screen  Never done    Shingles Vaccine (1 of 2) Never done    Low dose CT lung screening  02/28/2019    Colon cancer screen colonoscopy  04/10/2019    TSH testing  07/05/2020    Annual Wellness Visit (AWV)  08/01/2021    Lipid screen  07/31/2025    DTaP/Tdap/Td vaccine (4 - Td or Tdap) 06/19/2031    DEXA (modify frequency per FRAX score)  Completed    Flu vaccine  Completed    Pneumococcal 65+ years Vaccine  Completed    COVID-19 Vaccine  Completed    Hepatitis A vaccine  Aged Out    Hepatitis B vaccine  Aged Out    Hib vaccine  Aged Out    Meningococcal (ACWY) vaccine  Aged Out       Subjective:      Review of Systems   Constitutional: Positive for appetite change.    Musculoskeletal: Positive Drug Screen  -     Urine Drug Screen; Future  6. Tremor  -     POCT Rapid Drug Screen  -     Urine Drug Screen; Future  7. Arthritis  -     POCT Rapid Drug Screen  -     Urine Drug Screen; Future  8. Other chronic pain  -     POCT Rapid Drug Screen  -     Urine Drug Screen; Future  9. Closed fracture of right shoulder with routine healing, subsequent encounter  -     POCT Rapid Drug Screen  -     Urine Drug Screen; Future  10. Compression fracture of lumbar vertebra, unspecified lumbar vertebral level, sequela  -     POCT Rapid Drug Screen  -     Urine Drug Screen; Future      Return in about 2 weeks (around 6/30/2021) for Depression with Dr. Lawson Dunaway . Orders Placed This Encounter   Procedures    TSH With Reflex Ft4     Standing Status:   Future     Standing Expiration Date:   6/16/2022    Urine Drug Screen     Standing Status:   Future     Standing Expiration Date:   6/16/2022   [address]     Referral Priority:   Routine     Referral Type:   Eval and Treat     Referral Reason:   Specialty Services Required     Requested Specialty:   Psychology     Number of Visits Requested:   1    POCT Rapid Drug Screen     Orders Placed This Encounter   Medications    triamcinolone (KENALOG) 0.1 % cream     Sig: Apply topically 2 times daily. Dispense:  45 g     Refill:  0       Patient given educational materials - see patient instructions. Discussed use, benefit, and side effects of prescribed medications. All patient questions answered. Pt voiced understanding. Reviewed health maintenance. Instructed to continue current medications, diet and exercise. Patient agreed with treatment plan. Follow up as directed.      Electronically signed by Luisa Sparrow PA-C on 6/23/2021 at 12:45 AM

## 2021-06-16 NOTE — TELEPHONE ENCOUNTER
I saw pt today- She said she would not act on suicidal thoughts, wants to \"be here for her \" and I referred her to [facility] for counseling. She already takes Celexa. Fina Pascual,   When I asked pt why she is suicidal, she says it is because she and her  are being evicted from their apt d/t bed bugs. They claim they will be evicted even if they pay  to treat the bed bugs. Is that legal? Is there anything you can do for their housing situation?

## 2021-06-17 ENCOUNTER — CARE COORDINATION (OUTPATIENT)
Dept: CARE COORDINATION | Age: 76
End: 2021-06-17

## 2021-06-17 ENCOUNTER — TELEPHONE (OUTPATIENT)
Dept: PRIMARY CARE CLINIC | Age: 76
End: 2021-06-17

## 2021-06-17 RX ORDER — ACETAMINOPHEN AND CODEINE PHOSPHATE 300; 30 MG/1; MG/1
TABLET ORAL
Qty: 90 TABLET | Refills: 0 | Status: ON HOLD
Start: 2021-06-17 | End: 2021-06-25 | Stop reason: HOSPADM

## 2021-06-17 RX ORDER — CLONAZEPAM 0.25 MG/1
TABLET, ORALLY DISINTEGRATING ORAL
Qty: 90 TABLET | Refills: 0 | Status: SHIPPED | OUTPATIENT
Start: 2021-06-17 | End: 2021-07-19

## 2021-06-17 NOTE — CARE COORDINATION
PCP referral d/t housing situation. Patient and  are being evicted from their apartment d/t bedbugs even though they are paying for the        Called and spoke to patient, states she's very tired, and asks writer to call another time       Called for care management. LVM and provided call back information

## 2021-06-17 NOTE — TELEPHONE ENCOUNTER
Calling to let the patient know she left lab work and a referral at the office . She can  the referral at the  and our  has her lab work paper .

## 2021-06-18 ENCOUNTER — CARE COORDINATION (OUTPATIENT)
Dept: CARE COORDINATION | Age: 76
End: 2021-06-18

## 2021-06-19 ENCOUNTER — HOSPITAL ENCOUNTER (EMERGENCY)
Age: 76
Discharge: HOME | End: 2021-06-19
Attending: EMERGENCY MEDICINE
Payer: COMMERCIAL

## 2021-06-19 ENCOUNTER — APPOINTMENT (OUTPATIENT)
Dept: GENERAL RADIOLOGY | Age: 76
End: 2021-06-19
Payer: COMMERCIAL

## 2021-06-19 VITALS
SYSTOLIC BLOOD PRESSURE: 113 MMHG | BODY MASS INDEX: 25.11 KG/M2 | DIASTOLIC BLOOD PRESSURE: 68 MMHG | WEIGHT: 133 LBS | HEART RATE: 78 BPM | OXYGEN SATURATION: 94 % | RESPIRATION RATE: 16 BRPM | HEIGHT: 61 IN | TEMPERATURE: 98.8 F

## 2021-06-19 DIAGNOSIS — S62.317A CLOSED DISPLACED FRACTURE OF BASE OF FIFTH METACARPAL BONE OF LEFT HAND, INITIAL ENCOUNTER: Primary | ICD-10-CM

## 2021-06-19 DIAGNOSIS — S41.112A SKIN TEAR OF LEFT UPPER ARM WITHOUT COMPLICATION, INITIAL ENCOUNTER: ICD-10-CM

## 2021-06-19 PROCEDURE — 6360000002 HC RX W HCPCS: Performed by: EMERGENCY MEDICINE

## 2021-06-19 PROCEDURE — 6370000000 HC RX 637 (ALT 250 FOR IP): Performed by: EMERGENCY MEDICINE

## 2021-06-19 PROCEDURE — 99283 EMERGENCY DEPT VISIT LOW MDM: CPT

## 2021-06-19 PROCEDURE — 90715 TDAP VACCINE 7 YRS/> IM: CPT | Performed by: EMERGENCY MEDICINE

## 2021-06-19 PROCEDURE — 29125 APPL SHORT ARM SPLINT STATIC: CPT

## 2021-06-19 PROCEDURE — 73130 X-RAY EXAM OF HAND: CPT

## 2021-06-19 PROCEDURE — 73110 X-RAY EXAM OF WRIST: CPT

## 2021-06-19 PROCEDURE — 90471 IMMUNIZATION ADMIN: CPT | Performed by: EMERGENCY MEDICINE

## 2021-06-19 RX ORDER — GINSENG 100 MG
CAPSULE ORAL ONCE
Status: COMPLETED | OUTPATIENT
Start: 2021-06-19 | End: 2021-06-19

## 2021-06-19 RX ORDER — HYDROCODONE BITARTRATE AND ACETAMINOPHEN 5; 325 MG/1; MG/1
2 TABLET ORAL ONCE
Status: COMPLETED | OUTPATIENT
Start: 2021-06-19 | End: 2021-06-19

## 2021-06-19 RX ORDER — HYDROCODONE BITARTRATE AND ACETAMINOPHEN 5; 325 MG/1; MG/1
1 TABLET ORAL EVERY 6 HOURS PRN
Qty: 10 TABLET | Refills: 0 | Status: SHIPPED | OUTPATIENT
Start: 2021-06-19 | End: 2021-06-22

## 2021-06-19 RX ORDER — BACITRACIN ZINC AND POLYMYXIN B SULFATE 500; 1000 [USP'U]/G; [USP'U]/G
OINTMENT TOPICAL
Qty: 15 G | Refills: 1 | Status: SHIPPED | OUTPATIENT
Start: 2021-06-19 | End: 2021-06-26

## 2021-06-19 RX ADMIN — HYDROCODONE BITARTRATE AND ACETAMINOPHEN 2 TABLET: 5; 325 TABLET ORAL at 19:12

## 2021-06-19 RX ADMIN — BACITRACIN: 500 OINTMENT TOPICAL at 19:11

## 2021-06-19 RX ADMIN — TETANUS TOXOID, REDUCED DIPHTHERIA TOXOID AND ACELLULAR PERTUSSIS VACCINE, ADSORBED 0.5 ML: 5; 2.5; 8; 8; 2.5 SUSPENSION INTRAMUSCULAR at 19:11

## 2021-06-19 ASSESSMENT — ENCOUNTER SYMPTOMS
ABDOMINAL PAIN: 0
SHORTNESS OF BREATH: 0

## 2021-06-19 ASSESSMENT — PAIN SCALES - GENERAL: PAINLEVEL_OUTOF10: 9

## 2021-06-19 NOTE — ED PROVIDER NOTES
[address]  EMERGENCY DEPARTMENT ENCOUNTER      Pt Name: Gustabo Norris  MRN: [id number]  [place] 1945  Date of evaluation: 6/19/21    CHIEF COMPLAINT       Chief Complaint   Patient presents with    Fall    Wrist Injury     HISTORY OF PRESENT ILLNESS   HPI 76 y.o. female presents with c/o left hand and arm injury. Injury happened about an hour prior to arrival.  The patient states that she tripped on a curb fell down onto her left arm onto asphalt. She states that she has moderate to severe pain in her left hand and that she tore the skin on her left upper arm. She did not take any medications prior to arrival.  She does take Tylenol 3 at home for chronic pain. She denies striking her head. She denies any headache, neck pain, or loss of consciousness. She denies any pain in her hips or difficulty walking. REVIEW OF SYSTEMS       Review of Systems   Constitutional: Negative for fever. HENT: Negative for nosebleeds. Eyes: Negative for visual disturbance. Respiratory: Negative for shortness of breath. Cardiovascular: Negative for chest pain. Gastrointestinal: Negative for abdominal pain. Genitourinary: Negative for flank pain. Musculoskeletal: Positive for arthralgias. Skin: Positive for wound. Neurological: Negative for headaches. Psychiatric/Behavioral: Negative for confusion.        PAST MEDICAL HISTORY     Past Medical History:   Diagnosis Date    Alcoholism ([address] 10/20/2016    Anxiety     Cataract     Chronic pain     Depression     Hematochezia 4/7/2017    Hypothyroid     Osteoporosis     Peptic ulcer of duodenum     chronic    Schatzki's ring     Sessile rectal polyp 04/10/2017    multi tiny    Tobacco abuse     Tubular adenoma of colon 04/10/2017    x2       SURGICAL HISTORY       Past Surgical History:   Procedure Laterality Date    CARPAL TUNNEL RELEASE Right     CATARACT REMOVAL      CHOLECYSTECTOMY, LAPAROSCOPIC N/A 5/10/2021 CHOLECYSTECTOMY LAPAROSCOPIC ROBOTIC XI performed by Cristobal Phoenix, MD [email or web address]  04/10/2017    multi tiny sessile polyps; poor prep; tubular adenoma x 2    HERNIA REPAIR      HYSTERECTOMY      KYPHOSIS SURGERY  10/27/2016    kyphoplasty L1 and L5    [place] COLSC FLX W/RMVL OF TUMOR POLYP LESION SNARE TQ N/A 4/10/2017    COLONOSCOPY POLYPECTOMY SNARE x2 with photos performed by Colby Casey MD at [address] [facility] EGD TRANSORAL BIOPSY SINGLE/MULTIPLE N/A 4/10/2017    EGD BIOPSY x2 with photos performed by Colby Casey MD at [address] [address] ARTHROSCOPY Right     x 2    TUBAL LIGATION      UPPER GASTROINTESTINAL ENDOSCOPY  04/10/2017    schatzki's ring; chronic peptic duodenitis       CURRENT MEDICATIONS       Previous Medications    ACETAMINOPHEN-CODEINE (TYLENOL #3) 300-30 MG PER TABLET    TAKE ONE TABLET BY MOUTH EVERY 8 HOURS AS NEEDED FOR PAIN    ALBUTEROL SULFATE  (90 BASE) MCG/ACT INHALER    INHALE ONE TO TWO PUFFS BY MOUTH EVERY 4 HOURS AS NEEDED FOR WHEEZING OR SHORTNESS OF BREATH.  SPACE OUT TO EVERY 6 HOURS AS SYMPTOMS IMPROVE    BREO ELLIPTA 200-25 MCG/INH AEPB INHALER    INHALE ONE DOSE BY MOUTH DAILY    CALCIUM CARBONATE (OSCAL) 500 MG TABS TABLET    Take 500 mg by mouth daily    CITALOPRAM (CELEXA) 40 MG TABLET    Take 1 tablet by mouth daily    CLONAZEPAM (KLONOPIN) 0.25 MG DISINTEGRATING TABLET    PLACE ONE TABLET BY MOUTH THREE TIMES A DAY AS NEEDED FOR TREMORS FOR UP TO 30 DAYS    CYCLOSPORINE (RESTASIS) 0.05 % OPHTHALMIC EMULSION    Apply 1 drop to eye 2 times daily    DENOSUMAB (PROLIA) 60 MG/ML SOSY SC INJECTION    Inject 60 mg into the skin every 6 months Indications: next due Jan 3501    FOLIC ACID (FOLVITE) 1 MG TABLET    Take 1 tablet by mouth daily    GUAIFENESIN (MUCINEX) 600 MG EXTENDED RELEASE TABLET    Take 1 tablet by mouth 2 times daily    HANDICAP PLACARD MISC    by Does not apply route Exp - 10/21/2025    IBUPROFEN (ADVIL;MOTRIN) 200 MG TABLET    Take 400 mg by mouth every 6 hours as needed for Pain    LEVOTHYROXINE (SYNTHROID) 50 MCG TABLET    TAKE ONE TABLET BY MOUTH DAILY - MONDAY THROUGH SATURDAY, SKIP SUNDAY    MELOXICAM (MOBIC) 15 MG TABLET    TAKE ONE TABLET BY MOUTH DAILY    MENTHOL (BIOFREEZE) 10 % AERO    Apply topically daily as needed Indications: applies to back, shoulders and knees     MISC. DEVICES (STEP N REST WALKER) MISC    1 each by Does not apply route continuous Seated, wheeled walker    NYSTATIN (MYCOSTATIN) 634733 UNIT/GM CREAM    Apply topically 3 times daily. NYSTATIN (MYCOSTATIN) 346258 UNIT/GM POWDER    Apply 3 times daily. NYSTATIN (MYCOSTATIN) 357431 UNIT/ML SUSPENSION    Take 5 mLs by mouth 4 times daily Indications: thrush Swoosh and swallow for thrush    ONDANSETRON (ZOFRAN) 4 MG TABLET    Take every six hours as needed    PANTOPRAZOLE (PROTONIX) 40 MG TABLET    TAKE ONE TABLET BY MOUTH DAILY    POLYVINYL ALCOHOL-POVIDONE (CLEAR EYES NATURAL TEARS) 5-6 MG/ML SOLN OPTHALMIC SOLUTION    Place 1 drop into both eyes as needed for Dry Eyes     STOOL SOFTENER 100 MG CAPSULE    TAKE ONE CAPSULE BY MOUTH DAILY    TRIAMCINOLONE (KENALOG) 0.1 % CREAM    Apply topically 2 times daily. VITAMIN B-12 (CYANOCOBALAMIN) 1000 MCG TABLET    TAKE ONE TABLET BY MOUTH DAILY    VITAMIN D (CHOLECALCIFEROL) 1000 UNITS CAPS CAPSULE    Take 1,000 Units by mouth daily       ALLERGIES     is allergic to keflex [cephalexin], other, bactrim [sulfamethoxazole-trimethoprim], motrin [ibuprofen], sulfa antibiotics, and wellbutrin [bupropion]. FAMILY HISTORY     She indicated that the status of her mother is unknown. She indicated that the status of her father is unknown. SOCIAL HISTORY      reports that she has been smoking cigarettes. She has a 65.00 pack-year smoking history. She has never used smokeless tobacco. She reports current alcohol use of about 7.0 standard drinks of alcohol per week. She reports that she does not use drugs.     PHYSICAL EXAM INITIAL VITALS: /68   Pulse 78   Temp 98.8 °F (37.1 °C) (Oral)   Resp 16   Ht 5' 1\" (1.549 m)   Wt 133 lb (60.3 kg)   SpO2 94%   BMI 25.13 kg/m²     GEN: NAD  Head: NCAT  HEENT: PERRL. EOMI, No conjunctival hemorrhage. No pupil deformity. Negative pierce sign,  negative csf rhinorrhea. Negative raccoon eyes. Neck: No subq emphysema. Cervical spine with no midline TTP.  CVS: RRR, no murmurs, no rubs, no muffled heart sounds. Bilateral radial, dp, and pt pulses are 2+. Pulm: CTA b/l. Normal breath sounds over all lung fields. Abd: soft, non-tender to palpation, no ecchymosis, or erythema, no guarding or rebound  Skin: There is a skin tear in the left upper arm is approximately 4 cm x 13 cm in dimension  There is a lesion on the left lower arm. MSK: There is tenderness over the fifth metacarpal on the left hand. There is no snuffbox tenderness. There is no distal radial tenderness. There is no tenderness over the phalanges. Neurologic: Patient is alert and oriented x3, motor and sensation is intact in all 4 extremities, speech is fluent, patient has appropriate opposition of all of her digits, strength testing in the hand is limited secondary to pain. Extremities: Radial pulses are intact. MEDICAL DECISION MAKING:     MDM  76 y.o. female presenting with hand injury and skin tear after a fall from standing. She is not having any headache or neck pain. She is not on any anticoagulants. In regards to her skin tear, the wound is clean, treated with antibiotic ointment and a dressing applied. Tetanus is updated. I reviewed the xray and  X-ray shows a metacarpal fracture. Ulnar gutter splint applied. Analgesia applied. There is no snuffbox tenderness. Patient provided outpatient follow-up with orthopedics. Also with PCP for her skin tear.     After application of ulnar gutter splint to left hand by RN, Post application exam shows:  cap refill less than 2 seconds  there is no swelling or discoloration  Sensation intact and able to move distally  Splint is appropriate    D/w pt the results, treatment plan, warning precautions for prompt ED return and importance of close OP FU, she verbalizes understanding and agrees with the treatment plan. DIAGNOSTIC RESULTS       RADIOLOGY:All plain film, CT, MRI, and formal ultrasound images (except ED bedside ultrasound) are read by the radiologist and the images and interpretations are directly viewed by the emergency physician. XR WRIST LEFT (MIN 3 VIEWS)   Final Result   Oblique fracture involving the base of the 5th metacarpal extending into the   carpal metacarpal joint. Arthritic changes         XR HAND LEFT (MIN 3 VIEWS)   Final Result   Oblique fracture involving the base of the 5th metacarpal extending into the   carpal metacarpal joint. Arthritic changes             LABS: All lab results were reviewed by myself, and all abnormals are listed below. Labs Reviewed - No data to display    EMERGENCY DEPARTMENT COURSE:   Vitals:    Vitals:    06/19/21 1701   BP: 113/68   Pulse: 78   Resp: 16   Temp: 98.8 °F (37.1 °C)   TempSrc: Oral   SpO2: 94%   Weight: 133 lb (60.3 kg)   Height: 5' 1\" (1.549 m)       The patient was given the following medications while in the emergency department:  Orders Placed This Encounter   Medications    HYDROcodone-acetaminophen (NORCO) 5-325 MG per tablet 2 tablet    bacitracin ointment    Tetanus-Diphth-Acell Pertussis (BOOSTRIX) injection 0.5 mL     -------------------------  CRITICAL CARE:   CONSULTS: None  PROCEDURES: Procedures     FINAL IMPRESSION      1. Closed displaced fracture of base of fifth metacarpal bone of left hand, initial encounter    2.  Skin tear of left upper arm without complication, initial encounter          DISPOSITION/PLAN   DISPOSITION Decision To Discharge 06/19/2021 06:49:28 PM      PATIENT REFERRED TO:  Balbina Hammonds MD  [place] [address] [address] [address]  [phone]    In 3 days      Sherron Ring MD  [address]   [facility]  [phone]    In 3 days      [facility] ED  [facility] [address]  [address] [place] [address]  [phone]    If symptoms worsen      DISCHARGE MEDICATIONS:  New Prescriptions    No medications on file         Guillermo Arciniega MD  Attending Emergency Physician                     Guillermo Arciniega MD  06/19/21 1340 [facility] Beata Orozco MD  06/19/21 4653

## 2021-06-21 ENCOUNTER — CARE COORDINATION (OUTPATIENT)
Dept: CARE COORDINATION | Age: 76
End: 2021-06-21

## 2021-06-21 SDOH — ECONOMIC STABILITY: HOUSING INSECURITY: IN THE LAST 12 MONTHS, HOW MANY PLACES HAVE YOU LIVED?: 1

## 2021-06-21 SDOH — ECONOMIC STABILITY: HOUSING INSECURITY
IN THE LAST 12 MONTHS, WAS THERE A TIME WHEN YOU DID NOT HAVE A STEADY PLACE TO SLEEP OR SLEPT IN A SHELTER (INCLUDING NOW)?: NO

## 2021-06-21 SDOH — ECONOMIC STABILITY: INCOME INSECURITY: IN THE LAST 12 MONTHS, WAS THERE A TIME WHEN YOU WERE NOT ABLE TO PAY THE MORTGAGE OR RENT ON TIME?: NO

## 2021-06-21 NOTE — CARE COORDINATION
Left VM message asking patient to call me back at [phone] for care management enrollment, assess for needs. Will call again in a few days.     Future Appointments   Date Time Provider Junaid Christopher   6/22/2021 10:30 AM Inna Mclean [facility]   7/6/2021  3:00 PM MD ALYSSA Betts [facility]   7/7/2021 12:00 PM Rajesh Marques, [address] [place] [address]

## 2021-06-21 NOTE — CARE COORDINATION
Patient is currently experiencing problems with her housing. It's been reported that this patient has bed bugs and will be evicted from her apartment by 07/08/21.

## 2021-06-22 ENCOUNTER — TELEPHONE (OUTPATIENT)
Dept: PRIMARY CARE CLINIC | Age: 76
End: 2021-06-22

## 2021-06-22 ENCOUNTER — CARE COORDINATION (OUTPATIENT)
Dept: CARE COORDINATION | Age: 76
End: 2021-06-22

## 2021-06-22 ENCOUNTER — OFFICE VISIT (OUTPATIENT)
Dept: ORTHOPEDIC SURGERY | Age: 76
End: 2021-06-22
Payer: COMMERCIAL

## 2021-06-22 VITALS
RESPIRATION RATE: 12 BRPM | BODY MASS INDEX: 25.11 KG/M2 | OXYGEN SATURATION: 97 % | WEIGHT: 133 LBS | HEIGHT: 61 IN | HEART RATE: 77 BPM

## 2021-06-22 DIAGNOSIS — S62.317A DISPLACED FRACTURE OF BASE OF FIFTH METACARPAL BONE, LEFT HAND, INITIAL ENCOUNTER FOR CLOSED FRACTURE: Primary | ICD-10-CM

## 2021-06-22 PROCEDURE — 99204 OFFICE O/P NEW MOD 45 MIN: CPT | Performed by: PHYSICIAN ASSISTANT

## 2021-06-22 SDOH — ECONOMIC STABILITY: INCOME INSECURITY: IN THE LAST 12 MONTHS, WAS THERE A TIME WHEN YOU WERE NOT ABLE TO PAY THE MORTGAGE OR RENT ON TIME?: NO

## 2021-06-22 SDOH — ECONOMIC STABILITY: HOUSING INSECURITY: IN THE LAST 12 MONTHS, HOW MANY PLACES HAVE YOU LIVED?: 1

## 2021-06-22 NOTE — TELEPHONE ENCOUNTER
Daniel Diaz from the [employer] calling. Was wanting to make you aware that pt is getting evicted from her apt on July 8th due to her bed bugs. Pt will most likely be going to a nursing home. Pt fell sat and broke her hand. Pt saw ortho today and going back on fri for a cast. FYI. Any questions, can call Daniel Diaz @ [phone] EXT[de-identified] 1989. Just an FYI.

## 2021-06-22 NOTE — PROGRESS NOTES
[address] [facility], [address] [place], [address] [facility], [address] [facility]           Dept Phone: [phone]           Dept Fax:  [address]           Kalyan Emmanuel          Dept Phone: [phone]           Dept Fax:  [phone]    Chief Compliant:  Chief Complaint   Patient presents with    Hand Injury     Left, DOI- 6/19/21        Subjective:       Gustabo Norris is a 76 y.o. right hand-dominant female here for evaluation and treatment of a left hand injury. The injury occurred on 6/19/2021. Mechanism of injury was: [address] Since that time the patient has been experiencing left hand pain and swelling. The pain is currently rated moderate. The patient was originally seen at local emergency room where an x-ray was done, a fracture of the hand was identified and the patient was placed in a splint ulnar gutter splint. The patient was subsequently referred to Orthopedics for further management. The splint has remained in place and is clean and dry. Outside reports reviewed: ER records and x-ray reports. Patient's medications, allergies, past medical, surgical, social and family histories were reviewed and updated as appropriate. Review of Systems  Review of Systems   Constitutional: Negative for fever, chills, sweats, recent illness, or recent injury. Neurological: Negative for headaches, numbness, or weakness. Integumentary: Negative for rash, itching, ecchymosis, abrasions, or laceration. Musculoskeletal: Positive for Hand Injury (Left, DOI- 6/19/21)        Objective:   Physical Exam:  Constitutional: Patient is oriented to person, place, and time. Patient appears well-developed and well nourished.    Musculoskeletal:       General:   alert, appears stated age and cooperative   Gait:    Normal   Left Hand  Circulation:   warm, well evaluation of fifth metacarpal base fracture which occurred on 6/19/2021.    1. I discussed the entity of distal radius fractures with the patient. I fully outlined the anatomy regarding the hand and wrist.  All of her questions were answered fully. 2.  Had lengthy discussion with patient and  about nonoperative and operative intervention. Patient is educated that given the amount of displacement and intra-articular involvement she would likely benefit from operative intervention in the form of a closed reduction percutaneous pinning versus ORIF. Patient is educated that this was treated nonoperatively could result in nonunion/malunion, chronic pain and limited range of motion and function of this hand. 3.  After lengthy discussion patient elected to proceed with operative intervention with Dr. Jocelyne Aviles this will be done Friday in [facility] [place] 6/25/2021.  4. The patient was encouraged to keep her arm elevated and iced for the next 24-48 hours.   5. Follow up Postoperatively

## 2021-06-23 ENCOUNTER — ANESTHESIA EVENT (OUTPATIENT)
Dept: OPERATING ROOM | Age: 76
End: 2021-06-23
Payer: COMMERCIAL

## 2021-06-23 ASSESSMENT — ENCOUNTER SYMPTOMS: BACK PAIN: 1

## 2021-06-24 ENCOUNTER — OFFICE VISIT (OUTPATIENT)
Dept: PRIMARY CARE CLINIC | Age: 76
End: 2021-06-24
Payer: COMMERCIAL

## 2021-06-24 ENCOUNTER — HOSPITAL ENCOUNTER (OUTPATIENT)
Age: 76
Discharge: HOME | End: 2021-06-26
Payer: COMMERCIAL

## 2021-06-24 ENCOUNTER — HOSPITAL ENCOUNTER (OUTPATIENT)
Dept: GENERAL RADIOLOGY | Age: 76
Discharge: HOME | End: 2021-06-26
Payer: COMMERCIAL

## 2021-06-24 VITALS
OXYGEN SATURATION: 98 % | WEIGHT: 131.2 LBS | HEIGHT: 61 IN | DIASTOLIC BLOOD PRESSURE: 72 MMHG | SYSTOLIC BLOOD PRESSURE: 116 MMHG | BODY MASS INDEX: 24.77 KG/M2 | HEART RATE: 65 BPM

## 2021-06-24 DIAGNOSIS — G89.29 OTHER CHRONIC PAIN: ICD-10-CM

## 2021-06-24 DIAGNOSIS — M19.90 ARTHRITIS: ICD-10-CM

## 2021-06-24 DIAGNOSIS — R07.81 RIB PAIN ON LEFT SIDE: Primary | ICD-10-CM

## 2021-06-24 DIAGNOSIS — S32.000S COMPRESSION FRACTURE OF LUMBAR VERTEBRA, UNSPECIFIED LUMBAR VERTEBRAL LEVEL, SEQUELA: ICD-10-CM

## 2021-06-24 DIAGNOSIS — R07.81 RIB PAIN ON LEFT SIDE: ICD-10-CM

## 2021-06-24 PROBLEM — G25.0 ESSENTIAL TREMOR: Status: ACTIVE | Noted: 2021-06-24

## 2021-06-24 PROCEDURE — 71101 X-RAY EXAM UNILAT RIBS/CHEST: CPT

## 2021-06-24 PROCEDURE — 99213 OFFICE O/P EST LOW 20 MIN: CPT | Performed by: PHYSICIAN ASSISTANT

## 2021-06-24 RX ORDER — MELOXICAM 15 MG/1
TABLET ORAL
Qty: 30 TABLET | Refills: 2 | Status: SHIPPED | OUTPATIENT
Start: 2021-06-24 | End: 2021-11-23

## 2021-06-24 ASSESSMENT — ENCOUNTER SYMPTOMS
COUGH: 0
VOMITING: 0
SHORTNESS OF BREATH: 0
BACK PAIN: 1
NAUSEA: 0
WHEEZING: 0

## 2021-06-24 NOTE — PROGRESS NOTES
[address] [place] [address] PRIMARY CARE  [address] [address]  Dept: Armstrong Noemy Whiteside is a 76 y.o. female who presents today for her medical conditions/complaints as noted below. Chief Complaint   Patient presents with    Chest Pain     patient said she has rib pain on the left side. Patient said she fell and broke her left arm and she thinks she hurt her ribs when she did that    Other     patient said she sees ortho tomorrow to get her cast       HPI:     HPI  [place] on  6/19/21and broke her wrist. Having surgery tomorrow. She also hit her side when she tripped on a curb and her ribs hurt--she just started noticing this. She also had to move furniture away from the wall for [employer] to come to spray for bed bugs--they did not spray because they could not get everything away from walls.     LDL Cholesterol (mg/dL)   Date Value   07/31/2020 117       (goal LDL is <100)   AST (U/L)   Date Value   05/03/2021 19     ALT (U/L)   Date Value   05/03/2021 <5 (L)     BUN (mg/dL)   Date Value   05/03/2021 10     BP Readings from Last 3 Encounters:   06/24/21 116/72   06/19/21 113/68   06/16/21 138/84          (goal 120/80)    Past Medical History:   Diagnosis Date    Alcoholism ([facility] [address] 10/20/2016    Anxiety     Cataract     Chronic pain     Depression     Hematochezia 4/7/2017    Hypothyroid     Osteoporosis     Peptic ulcer of duodenum     chronic    Schatzki's ring     Sessile rectal polyp 04/10/2017    multi tiny    Tobacco abuse     Tubular adenoma of colon 04/10/2017    x2      Past Surgical History:   Procedure Laterality Date    CARPAL TUNNEL RELEASE Right     CATARACT REMOVAL      CHOLECYSTECTOMY, LAPAROSCOPIC N/A 5/10/2021    CHOLECYSTECTOMY LAPAROSCOPIC ROBOTIC XI performed by Maria C Saldivar MD at [address] [place] [address]  04/10/2017    multi tiny sessile polyps; poor prep; tubular adenoma x 2    HERNIA REPAIR      HYSTERECTOMY      KYPHOSIS SURGERY  10/27/2016    kyphoplasty L1 and L5    [place] COLSC FLX W/RMVL OF TUMOR POLYP LESION SNARE TQ N/A 4/10/2017    COLONOSCOPY POLYPECTOMY SNARE x2 with photos performed by Sarah Fry MD at [address] EGD TRANSORAL BIOPSY SINGLE/MULTIPLE N/A 4/10/2017    EGD BIOPSY x2 with photos performed by Sarah Fry MD at [facility] [address] ARTHROSCOPY Right     x 2    TUBAL LIGATION      UPPER GASTROINTESTINAL ENDOSCOPY  04/10/2017    schatzki's ring; chronic peptic duodenitis       Family History   Problem Relation Age of Onset    Heart Disease Mother     Asthma Mother     Heart Disease Father        Social History     Tobacco Use    Smoking status: Current Every Day Smoker     Packs/day: 1.00     Years: 65.00     Pack years: 65.00     Types: Cigarettes    Smokeless tobacco: Never Used   Substance Use Topics    Alcohol use: Yes     Alcohol/week: 7.0 standard drinks     Types: 7 Shots of liquor per week     Comment: deanns with ilya (1/2 and 1/2) daily      Current Outpatient Medications   Medication Sig Dispense Refill    meloxicam (MOBIC) 15 MG tablet TAKE ONE TABLET BY MOUTH DAILY 30 tablet 2    bacitracin-polymyxin b (POLYSPORIN) 500-97725 UNIT/GM ointment Apply topically 2 times daily. 15 g 1    acetaminophen-codeine (TYLENOL #3) 300-30 MG per tablet TAKE ONE TABLET BY MOUTH EVERY 8 HOURS AS NEEDED FOR PAIN 90 tablet 0    clonazePAM (KLONOPIN) 0.25 MG disintegrating tablet PLACE ONE TABLET BY MOUTH THREE TIMES A DAY AS NEEDED FOR TREMORS FOR UP TO 30 DAYS 90 tablet 0    triamcinolone (KENALOG) 0.1 % cream Apply topically 2 times daily.  45 g 0    pantoprazole (PROTONIX) 40 MG tablet TAKE ONE TABLET BY MOUTH DAILY 90 tablet 0    ondansetron (ZOFRAN) 4 MG tablet Take every six hours as needed 20 tablet 0    calcium carbonate (OSCAL) 500 MG TABS tablet Take 500 mg by mouth daily      albuterol sulfate  (90 Base) MCG/ACT inhaler INHALE ONE TO TWO PUFFS BY MOUTH EVERY 4 HOURS AS NEEDED FOR WHEEZING OR SHORTNESS OF BREATH. SPACE OUT TO EVERY 6 HOURS AS SYMPTOMS IMPROVE 18 g 2    vitamin B-12 (CYANOCOBALAMIN) 1000 MCG tablet TAKE ONE TABLET BY MOUTH DAILY 30 tablet 2    cycloSPORINE (RESTASIS) 0.05 % ophthalmic emulsion Apply 1 drop to eye 2 times daily      citalopram (CELEXA) 40 MG tablet Take 1 tablet by mouth daily 30 tablet 5    nystatin (MYCOSTATIN) 346829 UNIT/ML suspension Take 5 mLs by mouth 4 times daily Indications: thrush Swoosh and swallow for thrush 473 mL 0    Handicap Placard MISC by Does not apply route Exp - 10/21/2025 1 each 0    BREO ELLIPTA 200-25 MCG/INH AEPB inhaler INHALE ONE DOSE BY MOUTH DAILY 60 each 2    STOOL SOFTENER 100 MG capsule TAKE ONE CAPSULE BY MOUTH DAILY 30 capsule 2    levothyroxine (SYNTHROID) 50 MCG tablet TAKE ONE TABLET BY MOUTH DAILY - MONDAY THROUGH SATURDAY, SKIP SUNDAY 77 tablet 3    Misc. Devices (STEP N REST WALKER) MISC 1 each by Does not apply route continuous Seated, wheeled walker 1 each 0    nystatin (MYCOSTATIN) 571149 UNIT/GM cream Apply topically 3 times daily. 30 g 5    nystatin (MYCOSTATIN) 309435 UNIT/GM powder Apply 3 times daily.  60 g 5    ibuprofen (ADVIL;MOTRIN) 200 MG tablet Take 400 mg by mouth every 6 hours as needed for Pain      guaiFENesin (MUCINEX) 600 MG extended release tablet Take 1 tablet by mouth 2 times daily      denosumab (PROLIA) 60 MG/ML SOSY SC injection Inject 60 mg into the skin every 6 months Indications: next due Jan 2020      polyvinyl alcohol-povidone (CLEAR EYES NATURAL TEARS) 5-6 MG/ML SOLN opthalmic solution Place 1 drop into both eyes as needed for Dry Eyes       Menthol (BIOFREEZE) 10 % AERO Apply topically daily as needed Indications: applies to back, shoulders and knees       folic acid (FOLVITE) 1 MG tablet Take 1 tablet by mouth daily 30 tablet 3    Vitamin D (CHOLECALCIFEROL) 1000 UNITS CAPS capsule Take 1,000 Units by mouth daily       No current facility-administered

## 2021-06-25 ENCOUNTER — APPOINTMENT (OUTPATIENT)
Dept: GENERAL RADIOLOGY | Age: 76
End: 2021-06-25
Attending: ORTHOPAEDIC SURGERY
Payer: COMMERCIAL

## 2021-06-25 ENCOUNTER — ANESTHESIA (OUTPATIENT)
Dept: OPERATING ROOM | Age: 76
End: 2021-06-25
Payer: COMMERCIAL

## 2021-06-25 ENCOUNTER — HOSPITAL ENCOUNTER (OUTPATIENT)
Age: 76
Setting detail: OUTPATIENT SURGERY
Discharge: HOME | End: 2021-06-25
Attending: ORTHOPAEDIC SURGERY | Admitting: ORTHOPAEDIC SURGERY
Payer: COMMERCIAL

## 2021-06-25 VITALS
BODY MASS INDEX: 24.73 KG/M2 | OXYGEN SATURATION: 94 % | RESPIRATION RATE: 12 BRPM | TEMPERATURE: 98.2 F | SYSTOLIC BLOOD PRESSURE: 128 MMHG | WEIGHT: 131 LBS | HEIGHT: 61 IN | HEART RATE: 75 BPM | DIASTOLIC BLOOD PRESSURE: 78 MMHG

## 2021-06-25 VITALS — TEMPERATURE: 96.8 F | SYSTOLIC BLOOD PRESSURE: 102 MMHG | OXYGEN SATURATION: 100 % | DIASTOLIC BLOOD PRESSURE: 60 MMHG

## 2021-06-25 DIAGNOSIS — S62.92XA CLOSED FRACTURE OF LEFT HAND, INITIAL ENCOUNTER: ICD-10-CM

## 2021-06-25 DIAGNOSIS — S62.317D CLOSED DISPLACED FRACTURE OF BASE OF FIFTH METACARPAL BONE OF LEFT HAND WITH ROUTINE HEALING: Primary | ICD-10-CM

## 2021-06-25 LAB
ALBUMIN SERPL-MCNC: 4.4 G/DL (ref 3.5–5.2)
ALBUMIN/GLOBULIN RATIO: 1.6 (ref 1–2.5)
ALP BLD-CCNC: 85 U/L (ref 35–104)
ALT SERPL-CCNC: 7 U/L (ref 5–33)
ANION GAP SERPL CALCULATED.3IONS-SCNC: 12 MMOL/L (ref 9–17)
AST SERPL-CCNC: 16 U/L
BILIRUB SERPL-MCNC: 0.25 MG/DL (ref 0.3–1.2)
BUN BLDV-MCNC: 16 MG/DL (ref 8–23)
BUN/CREAT BLD: ABNORMAL (ref 9–20)
CALCIUM SERPL-MCNC: 9 MG/DL (ref 8.6–10.4)
CHLORIDE BLD-SCNC: 93 MMOL/L (ref 98–107)
CO2: 26 MMOL/L (ref 20–31)
CREAT SERPL-MCNC: 0.86 MG/DL (ref 0.5–0.9)
GFR AFRICAN AMERICAN: >60 ML/MIN
GFR NON-AFRICAN AMERICAN: >60 ML/MIN
GFR SERPL CREATININE-BSD FRML MDRD: ABNORMAL ML/MIN/{1.73_M2}
GFR SERPL CREATININE-BSD FRML MDRD: ABNORMAL ML/MIN/{1.73_M2}
GLUCOSE BLD-MCNC: 120 MG/DL (ref 70–99)
POTASSIUM SERPL-SCNC: 4.4 MMOL/L (ref 3.7–5.3)
SODIUM BLD-SCNC: 131 MMOL/L (ref 135–144)
TOTAL PROTEIN: 7.2 G/DL (ref 6.4–8.3)

## 2021-06-25 PROCEDURE — 7100000010 HC PHASE II RECOVERY - FIRST 15 MIN: Performed by: ORTHOPAEDIC SURGERY

## 2021-06-25 PROCEDURE — 2709999900 HC NON-CHARGEABLE SUPPLY: Performed by: ORTHOPAEDIC SURGERY

## 2021-06-25 PROCEDURE — 94640 AIRWAY INHALATION TREATMENT: CPT

## 2021-06-25 PROCEDURE — 2500000003 HC RX 250 WO HCPCS: Performed by: NURSE ANESTHETIST, CERTIFIED REGISTERED

## 2021-06-25 PROCEDURE — 6370000000 HC RX 637 (ALT 250 FOR IP)

## 2021-06-25 PROCEDURE — 80053 COMPREHEN METABOLIC PANEL: CPT

## 2021-06-25 PROCEDURE — C1713 ANCHOR/SCREW BN/BN,TIS/BN: HCPCS | Performed by: ORTHOPAEDIC SURGERY

## 2021-06-25 PROCEDURE — 6360000002 HC RX W HCPCS

## 2021-06-25 PROCEDURE — 3209999900 FLUORO FOR SURGICAL PROCEDURES

## 2021-06-25 PROCEDURE — 3700000001 HC ADD 15 MINUTES (ANESTHESIA): Performed by: ORTHOPAEDIC SURGERY

## 2021-06-25 PROCEDURE — 36415 COLL VENOUS BLD VENIPUNCTURE: CPT

## 2021-06-25 PROCEDURE — 3600000013 HC SURGERY LEVEL 3 ADDTL 15MIN: Performed by: ORTHOPAEDIC SURGERY

## 2021-06-25 PROCEDURE — 6360000002 HC RX W HCPCS: Performed by: NURSE ANESTHETIST, CERTIFIED REGISTERED

## 2021-06-25 PROCEDURE — 7100000000 HC PACU RECOVERY - FIRST 15 MIN: Performed by: ORTHOPAEDIC SURGERY

## 2021-06-25 PROCEDURE — 2580000003 HC RX 258: Performed by: ANESTHESIOLOGY

## 2021-06-25 PROCEDURE — 7100000011 HC PHASE II RECOVERY - ADDTL 15 MIN: Performed by: ORTHOPAEDIC SURGERY

## 2021-06-25 PROCEDURE — 7100000001 HC PACU RECOVERY - ADDTL 15 MIN: Performed by: ORTHOPAEDIC SURGERY

## 2021-06-25 PROCEDURE — 3700000000 HC ANESTHESIA ATTENDED CARE: Performed by: ORTHOPAEDIC SURGERY

## 2021-06-25 PROCEDURE — 3600000003 HC SURGERY LEVEL 3 BASE: Performed by: ORTHOPAEDIC SURGERY

## 2021-06-25 RX ORDER — HYDRALAZINE HYDROCHLORIDE 20 MG/ML
5 INJECTION INTRAMUSCULAR; INTRAVENOUS EVERY 10 MIN PRN
Status: DISCONTINUED | OUTPATIENT
Start: 2021-06-25 | End: 2021-06-25 | Stop reason: HOSPADM

## 2021-06-25 RX ORDER — TRAMADOL HYDROCHLORIDE 50 MG/1
50 TABLET ORAL EVERY 4 HOURS PRN
Qty: 18 TABLET | Refills: 0 | Status: SHIPPED | OUTPATIENT
Start: 2021-06-25 | End: 2021-06-28

## 2021-06-25 RX ORDER — SODIUM CHLORIDE 9 MG/ML
25 INJECTION, SOLUTION INTRAVENOUS PRN
Status: DISCONTINUED | OUTPATIENT
Start: 2021-06-25 | End: 2021-06-25 | Stop reason: HOSPADM

## 2021-06-25 RX ORDER — SODIUM CHLORIDE 0.9 % (FLUSH) 0.9 %
10 SYRINGE (ML) INJECTION EVERY 12 HOURS SCHEDULED
Status: DISCONTINUED | OUTPATIENT
Start: 2021-06-25 | End: 2021-06-25 | Stop reason: HOSPADM

## 2021-06-25 RX ORDER — SODIUM CHLORIDE 0.9 % (FLUSH) 0.9 %
10 SYRINGE (ML) INJECTION PRN
Status: DISCONTINUED | OUTPATIENT
Start: 2021-06-25 | End: 2021-06-25 | Stop reason: HOSPADM

## 2021-06-25 RX ORDER — ONDANSETRON 2 MG/ML
INJECTION INTRAMUSCULAR; INTRAVENOUS PRN
Status: DISCONTINUED | OUTPATIENT
Start: 2021-06-25 | End: 2021-06-25 | Stop reason: SDUPTHER

## 2021-06-25 RX ORDER — TRAMADOL HYDROCHLORIDE 50 MG/1
TABLET ORAL
Status: COMPLETED
Start: 2021-06-25 | End: 2021-06-25

## 2021-06-25 RX ORDER — PROMETHAZINE HYDROCHLORIDE 25 MG/ML
6.25 INJECTION, SOLUTION INTRAMUSCULAR; INTRAVENOUS
Status: DISCONTINUED | OUTPATIENT
Start: 2021-06-25 | End: 2021-06-25 | Stop reason: HOSPADM

## 2021-06-25 RX ORDER — ONDANSETRON 2 MG/ML
INJECTION INTRAMUSCULAR; INTRAVENOUS
Status: COMPLETED
Start: 2021-06-25 | End: 2021-06-25

## 2021-06-25 RX ORDER — TRAMADOL HYDROCHLORIDE 50 MG/1
50 TABLET ORAL ONCE
Status: COMPLETED | OUTPATIENT
Start: 2021-06-25 | End: 2021-06-25

## 2021-06-25 RX ORDER — CLINDAMYCIN PHOSPHATE 900 MG/50ML
INJECTION INTRAVENOUS
Status: DISCONTINUED
Start: 2021-06-25 | End: 2021-06-25 | Stop reason: HOSPADM

## 2021-06-25 RX ORDER — PROPOFOL 10 MG/ML
INJECTION, EMULSION INTRAVENOUS PRN
Status: DISCONTINUED | OUTPATIENT
Start: 2021-06-25 | End: 2021-06-25 | Stop reason: SDUPTHER

## 2021-06-25 RX ORDER — SODIUM CHLORIDE, SODIUM LACTATE, POTASSIUM CHLORIDE, CALCIUM CHLORIDE 600; 310; 30; 20 MG/100ML; MG/100ML; MG/100ML; MG/100ML
INJECTION, SOLUTION INTRAVENOUS CONTINUOUS
Status: DISCONTINUED | OUTPATIENT
Start: 2021-06-25 | End: 2021-06-25 | Stop reason: HOSPADM

## 2021-06-25 RX ORDER — LIDOCAINE HYDROCHLORIDE 10 MG/ML
INJECTION, SOLUTION EPIDURAL; INFILTRATION; INTRACAUDAL; PERINEURAL PRN
Status: DISCONTINUED | OUTPATIENT
Start: 2021-06-25 | End: 2021-06-25 | Stop reason: SDUPTHER

## 2021-06-25 RX ORDER — HYDROCODONE BITARTRATE AND ACETAMINOPHEN 5; 325 MG/1; MG/1
1 TABLET ORAL
Status: DISCONTINUED | OUTPATIENT
Start: 2021-06-25 | End: 2021-06-25 | Stop reason: HOSPADM

## 2021-06-25 RX ORDER — DIPHENHYDRAMINE HYDROCHLORIDE 50 MG/ML
12.5 INJECTION INTRAMUSCULAR; INTRAVENOUS
Status: DISCONTINUED | OUTPATIENT
Start: 2021-06-25 | End: 2021-06-25 | Stop reason: HOSPADM

## 2021-06-25 RX ORDER — MEPERIDINE HYDROCHLORIDE 50 MG/ML
12.5 INJECTION INTRAMUSCULAR; INTRAVENOUS; SUBCUTANEOUS EVERY 5 MIN PRN
Status: DISCONTINUED | OUTPATIENT
Start: 2021-06-25 | End: 2021-06-25 | Stop reason: HOSPADM

## 2021-06-25 RX ORDER — IPRATROPIUM BROMIDE AND ALBUTEROL SULFATE 2.5; .5 MG/3ML; MG/3ML
SOLUTION RESPIRATORY (INHALATION)
Status: COMPLETED
Start: 2021-06-25 | End: 2021-06-25

## 2021-06-25 RX ORDER — FENTANYL CITRATE 50 UG/ML
INJECTION, SOLUTION INTRAMUSCULAR; INTRAVENOUS PRN
Status: DISCONTINUED | OUTPATIENT
Start: 2021-06-25 | End: 2021-06-25 | Stop reason: SDUPTHER

## 2021-06-25 RX ORDER — ONDANSETRON 2 MG/ML
4 INJECTION INTRAMUSCULAR; INTRAVENOUS EVERY 6 HOURS PRN
Status: DISCONTINUED | OUTPATIENT
Start: 2021-06-25 | End: 2021-06-25 | Stop reason: HOSPADM

## 2021-06-25 RX ORDER — METOCLOPRAMIDE HYDROCHLORIDE 5 MG/ML
10 INJECTION INTRAMUSCULAR; INTRAVENOUS
Status: DISCONTINUED | OUTPATIENT
Start: 2021-06-25 | End: 2021-06-25 | Stop reason: HOSPADM

## 2021-06-25 RX ORDER — IPRATROPIUM BROMIDE AND ALBUTEROL SULFATE 2.5; .5 MG/3ML; MG/3ML
1 SOLUTION RESPIRATORY (INHALATION) ONCE
Status: COMPLETED | OUTPATIENT
Start: 2021-06-25 | End: 2021-06-25

## 2021-06-25 RX ORDER — CLINDAMYCIN PHOSPHATE 150 MG/ML
INJECTION, SOLUTION INTRAVENOUS PRN
Status: DISCONTINUED | OUTPATIENT
Start: 2021-06-25 | End: 2021-06-25 | Stop reason: SDUPTHER

## 2021-06-25 RX ORDER — SODIUM CHLORIDE 9 MG/ML
INJECTION, SOLUTION INTRAVENOUS CONTINUOUS
Status: DISCONTINUED | OUTPATIENT
Start: 2021-06-25 | End: 2021-06-25 | Stop reason: HOSPADM

## 2021-06-25 RX ADMIN — Medication 0.5 MG: at 15:59

## 2021-06-25 RX ADMIN — HYDROMORPHONE HYDROCHLORIDE 0.5 MG: 1 INJECTION, SOLUTION INTRAMUSCULAR; INTRAVENOUS; SUBCUTANEOUS at 16:27

## 2021-06-25 RX ADMIN — ONDANSETRON 4 MG: 2 INJECTION, SOLUTION INTRAMUSCULAR; INTRAVENOUS at 15:35

## 2021-06-25 RX ADMIN — ONDANSETRON 4 MG: 2 INJECTION INTRAMUSCULAR; INTRAVENOUS at 15:58

## 2021-06-25 RX ADMIN — TRAMADOL HYDROCHLORIDE 50 MG: 50 TABLET ORAL at 16:09

## 2021-06-25 RX ADMIN — PROPOFOL INJECTABLE EMULSION 100 MG: 10 INJECTION, EMULSION INTRAVENOUS at 15:13

## 2021-06-25 RX ADMIN — LIDOCAINE HYDROCHLORIDE 50 MG: 10 INJECTION, SOLUTION EPIDURAL; INFILTRATION; INTRACAUDAL; PERINEURAL at 15:13

## 2021-06-25 RX ADMIN — HYDROMORPHONE HYDROCHLORIDE 0.5 MG: 1 INJECTION, SOLUTION INTRAMUSCULAR; INTRAVENOUS; SUBCUTANEOUS at 15:59

## 2021-06-25 RX ADMIN — IPRATROPIUM BROMIDE AND ALBUTEROL SULFATE 1 AMPULE: 2.5; .5 SOLUTION RESPIRATORY (INHALATION) at 13:18

## 2021-06-25 RX ADMIN — SODIUM CHLORIDE, POTASSIUM CHLORIDE, SODIUM LACTATE AND CALCIUM CHLORIDE: 600; 310; 30; 20 INJECTION, SOLUTION INTRAVENOUS at 15:13

## 2021-06-25 RX ADMIN — CLINDAMYCIN PHOSPHATE 900 MG: 150 INJECTION, SOLUTION INTRAMUSCULAR; INTRAVENOUS at 15:20

## 2021-06-25 RX ADMIN — Medication 0.5 MG: at 16:27

## 2021-06-25 RX ADMIN — IPRATROPIUM BROMIDE AND ALBUTEROL SULFATE 1 AMPULE: .5; 3 SOLUTION RESPIRATORY (INHALATION) at 13:18

## 2021-06-25 RX ADMIN — FENTANYL CITRATE 100 MCG: 50 INJECTION, SOLUTION INTRAMUSCULAR; INTRAVENOUS at 15:13

## 2021-06-25 RX ADMIN — TRAMADOL HYDROCHLORIDE 50 MG: 50 TABLET, FILM COATED ORAL at 16:09

## 2021-06-25 ASSESSMENT — PULMONARY FUNCTION TESTS
PIF_VALUE: 1
PIF_VALUE: 2
PIF_VALUE: 21
PIF_VALUE: 11
PIF_VALUE: 4
PIF_VALUE: 2
PIF_VALUE: 0
PIF_VALUE: 2
PIF_VALUE: 11
PIF_VALUE: 2
PIF_VALUE: 1
PIF_VALUE: 4
PIF_VALUE: 2
PIF_VALUE: 12
PIF_VALUE: 11
PIF_VALUE: 3
PIF_VALUE: 11
PIF_VALUE: 11
PIF_VALUE: 2
PIF_VALUE: 10
PIF_VALUE: 1
PIF_VALUE: 4
PIF_VALUE: 2
PIF_VALUE: 3
PIF_VALUE: 3
PIF_VALUE: 2
PIF_VALUE: 2
PIF_VALUE: 1
PIF_VALUE: 3
PIF_VALUE: 2
PIF_VALUE: 2

## 2021-06-25 ASSESSMENT — PAIN - FUNCTIONAL ASSESSMENT
PAIN_FUNCTIONAL_ASSESSMENT: PREVENTS OR INTERFERES SOME ACTIVE ACTIVITIES AND ADLS
PAIN_FUNCTIONAL_ASSESSMENT: 0-10

## 2021-06-25 ASSESSMENT — PAIN SCALES - GENERAL
PAINLEVEL_OUTOF10: 9
PAINLEVEL_OUTOF10: 10
PAINLEVEL_OUTOF10: 9

## 2021-06-25 ASSESSMENT — LIFESTYLE VARIABLES: SMOKING_STATUS: 1

## 2021-06-25 ASSESSMENT — PAIN DESCRIPTION - DESCRIPTORS: DESCRIPTORS: ACHING;THROBBING

## 2021-06-25 ASSESSMENT — PAIN DESCRIPTION - LOCATION: LOCATION: HAND

## 2021-06-25 ASSESSMENT — PAIN DESCRIPTION - ORIENTATION: ORIENTATION: LEFT

## 2021-06-25 ASSESSMENT — PAIN DESCRIPTION - PAIN TYPE: TYPE: SURGICAL PAIN

## 2021-06-25 NOTE — PROGRESS NOTES
CLINICAL PHARMACY NOTE: MEDS TO BEDS    Total # of Prescriptions Filled: 1   The following medications were delivered to the patient:  · Tramadol 50mg    Additional Documentation:
diuretic)  · If applicable bring your:  · Inhaler (s)  · Hearing aid(s)  · Eyeglasses and Case (If you wear contacts they have to be removed before surgery, bring case and solution)  · Any paperwork given to you by your doctor  · Any X-rays you were told to bring  · A copy of your Living Will or Durable Power of   · DO NOT take anticoagulants (blood thinners, aspirin or aspirin-containing products) two weeks prior to your surgery. You may start taking again 2 days post-operatively, unless otherwise directed by your doctor. · DO NOT take any diabetic pills or insulin. Please bring your sliding scale instructions and sick day plan with you. If you have a low blood sugar reaction after midnight, drink 4 ounces of apple juice or regular pop. · Wear loose, comfortable clothing that is easy to put on and take off. A locker will be provided to store your clothing during the procedure. The key can be given to a family member/friend or it will remain in post-op with the nurse. · You will be returning home the same day as your surgery, you will need to have a responsible adult (25years of age or older) present to drive you home. You will need someone stay with you at home for the first 24 hours following your surgery. This is due to the anesthesia and the medication given to you during surgery and recovery. · Your doctor may talk with your family immediately following your procedure. Depending on your needs, you will stay in the recovery room between 30 minutes to 2 hours. · While you are recovering, the surgery family waiting room  can answer many of your family's questions. All medically related questions will be answered by a medical professional. If you had outpatient surgery, you will meet your family for discharge instructions before leaving.

## 2021-06-25 NOTE — H&P
was not assessed today   Sensation:   intact to light touch   Tenderness:    Point tenderness to the base of the fifth metacarpal mild tenderness to the base of the fourth metacarpal.       Neurological: Patient is alert and oriented to person, place, and time. Normal strenght. No sensory deficit. Skin: Skin is warm and dry  Psychiatric: Behavior is normal. Thought content normal.  Nursing note and vitals reviewed.         Imaging  Labs and Imaging:      XR taken today:  No results found.      Previous Imagin2021 3 views of the left hand and left wrist  FINDINGS:   Oblique fracture involving the base of the 5th metacarpal extending into the   carpal metacarpal joint.  Diffuse osteopenia. Chante San is no evidence of   dislocation.  Narrowing of the DIP and PIP joints of the 2nd, 3rd and 4th   digits as well as 1st carpometacarpal joint.  Chondrocalcinosis of the   triangular fibrocartilage complex.  The remaining joint spaces appear well   maintained. Mild soft tissue swelling.           Impression   Oblique fracture involving the base of the 5th metacarpal extending into the   carpal metacarpal joint.       Arthritic changes                Assessment:      1.  Displaced fracture of base of fifth metacarpal bone, left hand, initial encounter for closed fracture               Past Medical History:    Past Medical History:   Diagnosis Date    Alcoholism ([address] 10/20/2016    Anxiety     Cataract     Chronic pain     Depression     Hematochezia 2017    Hypothyroid     Osteoporosis     Peptic ulcer of duodenum     chronic    Schatzki's ring     Sessile rectal polyp 04/10/2017    multi tiny    Tobacco abuse     Tubular adenoma of colon 04/10/2017    x2       Past Surgical History:    Past Surgical History:   Procedure Laterality Date    CARPAL TUNNEL RELEASE Right     CATARACT REMOVAL      CHOLECYSTECTOMY, LAPAROSCOPIC N/A 5/10/2021    CHOLECYSTECTOMY LAPAROSCOPIC ROBOTIC XI performed by Kaylee Thomas Austin Juan MD at [facility]  04/10/2017    multi tiny sessile polyps; poor prep; tubular adenoma x 2    HERNIA REPAIR      HYSTERECTOMY      KYPHOSIS SURGERY  10/27/2016    kyphoplasty L1 and L5    [place] COLSC FLX W/RMVL OF TUMOR POLYP LESION SNARE TQ N/A 4/10/2017    COLONOSCOPY POLYPECTOMY SNARE x2 with photos performed by Tani Espinoza MD at [address] [place] [address] EGD TRANSORAL BIOPSY SINGLE/MULTIPLE N/A 4/10/2017    EGD BIOPSY x2 with photos performed by Tani Espinoza MD at [facility] [address] ARTHROSCOPY Right     x 2    TUBAL LIGATION      UPPER GASTROINTESTINAL ENDOSCOPY  04/10/2017    schatzki's ring; chronic peptic duodenitis       Medications Prior to Admission:   Prior to Admission medications    Medication Sig Start Date End Date Taking? Authorizing Provider   bacitracin-polymyxin b (POLYSPORIN) 500-33092 UNIT/GM ointment Apply topically 2 times daily. 6/19/21 6/26/21 Yes Elvira Garces MD   acetaminophen-codeine (TYLENOL #3) 300-30 MG per tablet TAKE ONE TABLET BY MOUTH EVERY 8 HOURS AS NEEDED FOR PAIN 6/17/21 7/17/21 Yes Jose Vallejo MD   clonazePAM (KLONOPIN) 0.25 MG disintegrating tablet PLACE ONE TABLET BY MOUTH THREE TIMES A DAY AS NEEDED FOR TREMORS FOR UP TO 30 DAYS 6/17/21 7/17/21 Yes Jose Vallejo MD   pantoprazole (PROTONIX) 40 MG tablet TAKE ONE TABLET BY MOUTH DAILY 6/11/21  Yes Jose Vallejo MD   ondansetron [facility]) 4 MG tablet Take every six hours as needed 5/10/21  Yes Margaret Sexton MD   calcium carbonate (OSCAL) 500 MG TABS tablet Take 500 mg by mouth daily   Yes Historical Provider, MD   albuterol sulfate  (90 Base) MCG/ACT inhaler INHALE ONE TO TWO PUFFS BY MOUTH EVERY 4 HOURS AS NEEDED FOR WHEEZING OR SHORTNESS OF BREATH.  SPACE OUT TO EVERY 6 HOURS AS SYMPTOMS IMPROVE 2/18/21  Yes Jose Vallejo MD   vitamin B-12 (CYANOCOBALAMIN) 1000 MCG tablet TAKE ONE TABLET BY MOUTH DAILY 1/6/21  Yes Jose Vallejo MD   cycloSPORINE (RESTASIS) 0.05 % ophthalmic Does not apply route continuous Seated, wheeled walker 8/26/20   Lilly Patricio MD   nystatin (MYCOSTATIN) 889966 UNIT/GM powder Apply 3 times daily. 8/24/20   Lilly Patricio MD   denosumab (PROLIA) 60 MG/ML SOSY SC injection Inject 60 mg into the skin every 6 months Indications: next due Jan 2020    Historical Provider, MD    Scheduled Meds:   sodium chloride flush  10 mL Intravenous 2 times per day     Continuous Infusions:   sodium chloride      lactated ringers      sodium chloride       PRN Meds:.sodium chloride flush, sodium chloride      Allergies:  Keflex [cephalexin], Other, Bactrim [sulfamethoxazole-trimethoprim], Motrin [ibuprofen], Sulfa antibiotics, and Wellbutrin [bupropion]    Social History:   Social History     Occupational History    Not on file   Tobacco Use    Smoking status: Current Every Day Smoker     Packs/day: 1.00     Years: 65.00     Pack years: 65.00     Types: Cigarettes    Smokeless tobacco: Never Used   Vaping Use    Vaping Use: Never used   Substance and Sexual Activity    Alcohol use:  Yes     Alcohol/week: 7.0 standard drinks     Types: 7 Shots of liquor per week     Comment: lani with ilya (1/2 and 1/2) daily    Drug use: No    Sexual activity: Not Currently     Partners: Male        Family History:  Family History   Problem Relation Age of Onset    Heart Disease Mother     Asthma Mother     Heart Disease Father          REVIEW OF SYSTEMS:  Gen: Negative for nausea, vomiting, diarrhea, fever, chills, night sweats  Heart: Negative for HTN, palpitations, chest pain  Lungs: Negative for wheezes, asthma or SOB  GI: Negative for nausea, vomiting  Endo: Negative for diabetes  Heme: Negative for DVT       PHYSICAL EXAM:  Patient Vitals for the past 24 hrs:   BP Temp Temp src Pulse Resp Height Weight   06/25/21 1242 (!) 133/90 97.8 °F (36.6 °C) Tympanic 75 12 5' 1\" (1.549 m) 131 lb (59.4 kg)     Gen: alert and oriented  Head: normorcephalic, atraumatic  Neck:

## 2021-06-25 NOTE — ANESTHESIA PRE PROCEDURE
thrush 12/21/20  Yes Christina Frye MD   BREO ELLIPTA 200-25 MCG/INH AEPB inhaler INHALE ONE DOSE BY MOUTH DAILY 10/19/20  Yes Christina Frye MD   STOOL SOFTENER 100 MG capsule TAKE ONE CAPSULE BY MOUTH DAILY 9/10/20  Yes Christina Frye MD   levothyroxine (SYNTHROID) 50 MCG tablet TAKE ONE TABLET BY MOUTH DAILY - C/[address] 10, SKIP CHELSIE 9/3/20  Yes Christina Frye MD   nystatin (MYCOSTATIN) 620963 UNIT/GM cream Apply topically 3 times daily. 8/24/20  Yes Christina Frye MD   nystatin (MYCOSTATIN) 880125 UNIT/GM powder Apply 3 times daily. 8/24/20  Yes Christina Frye MD   ibuprofen (ADVIL;MOTRIN) 200 MG tablet Take 400 mg by mouth every 6 hours as needed for Pain   Yes Historical Provider, MD   guaiFENesin (MUCINEX) 600 MG extended release tablet Take 1 tablet by mouth 2 times daily 12/3/19  Yes Christina Frye MD   polyvinyl alcohol-povidone (CLEAR EYES NATURAL TEARS) 5-6 MG/ML SOLN opthalmic solution Place 1 drop into both eyes as needed for Dry Eyes    Yes Historical Provider, MD   Menthol (BIOFREEZE) 10 % AERO Apply topically daily as needed Indications: applies to back, shoulders and knees    Yes Historical Provider, MD   folic acid (FOLVITE) 1 MG tablet Take 1 tablet by mouth daily 9/9/19  Yes Lorena Quinteros MD   Vitamin D (CHOLECALCIFEROL) 1000 UNITS CAPS capsule Take 1,000 Units by mouth daily   Yes Historical Provider, MD   meloxicam (MOBIC) 15 MG tablet TAKE ONE TABLET BY MOUTH DAILY 6/24/21   Inocencia Hobbs PA-C   pantoprazole (PROTONIX) 40 MG tablet TAKE ONE TABLET BY MOUTH DAILY 6/11/21   Christina Frye MD   Handicap Placard MISC by Does not apply route Exp - 10/21/2025 10/21/20   Christina Frye MD   Misc.  Devices (STEP N REST WALKER) MISC 1 each by Does not apply route continuous Seated, wheeled walker 8/26/20   Christina Frye MD   denosumab (PROLIA) 60 MG/ML SOSY SC injection Inject 60 mg into the skin every 6 months Indications: next due Jan 2020 Historical Provider, MD       Current medications:    Current Facility-Administered Medications   Medication Dose Route Frequency Provider Last Rate Last Admin    0.9 % sodium chloride infusion   Intravenous Continuous Audelia Case MD        lactated ringers infusion   Intravenous Continuous Audelia Case MD        sodium chloride flush 0.9 % injection 10 mL  10 mL Intravenous 2 times per day Audelia Case MD        sodium chloride flush 0.9 % injection 10 mL  10 mL Intravenous PRN Audelia Case MD        0.9 % sodium chloride infusion  25 mL Intravenous PRN Audelia Case MD           Allergies: Allergies   Allergen Reactions    Keflex [Cephalexin]      Diarrhea     Other      Seasonal allergies.      Bactrim [Sulfamethoxazole-Trimethoprim] Diarrhea and Nausea And Vomiting    Motrin [Ibuprofen] Diarrhea           Sulfa Antibiotics Diarrhea and Nausea And Vomiting    Wellbutrin [Bupropion] Other (See Comments)     Lightheaded, Dizziness       Problem List:    Patient Active Problem List   Diagnosis Code    Anorexia R63.0    Arthritis M19.90    Hypothyroidism E03.9    Memory loss R41.3    Menopause Z78.0    Muscle weakness M62.81    Tremor R25.1    Other specified hypothyroidism E03.8    Chronic pain G89.29    Compressed spine fracture ([address] M48.50XA    Closed fracture of second lumbar vertebra ([address] S32.029A    Closed fracture of ninth thoracic vertebra ([address] S22.079A    Menopausal osteoporosis M81.0    Alcoholism ([address] F10.20    Compression fx, lumbar spine ([address] S32.000A    Osteoporosis M81.0    Dysthymia F34.1    Medication management Z79.899    Diarrhea R19.7    Hyponatremia E87.1    Hypokalemia E87.6    Electrolyte imbalance E87.8    Sessile rectal polyp K62.1    Tubular adenoma of colon D12.6    Schatzki's ring K22.2    Peptic ulcer of duodenum K26.3    History of Helicobacter pylori infection Z86.19    Chronic obstructive pulmonary disease (HCC) J44.9    Closed fracture of multiple ribs of left side with routine healing S22.42XD    Chronic bilateral low back pain without sciatica M54.5, G89.29    Overflow incontinence N39.490    Fracture of right shoulder with routine healing S42. 91XD    Chest pain R07.9    Chronic cholecystitis K81.1    Essential tremor G25.0       Past Medical History:        Diagnosis Date    Alcoholism ([address] 10/20/2016    Anxiety     Cataract     Chronic pain     Depression     Hematochezia 4/7/2017    Hypothyroid     Osteoporosis     Peptic ulcer of duodenum     chronic    Schatzki's ring     Sessile rectal polyp 04/10/2017    multi tiny    Tobacco abuse     Tubular adenoma of colon 04/10/2017    x2       Past Surgical History:        Procedure Laterality Date    CARPAL TUNNEL RELEASE Right     CATARACT REMOVAL      CHOLECYSTECTOMY, LAPAROSCOPIC N/A 5/10/2021    CHOLECYSTECTOMY LAPAROSCOPIC ROBOTIC XI performed by Adis Daigle MD at [place] [address]  04/10/2017    multi tiny sessile polyps; poor prep; tubular adenoma x 2    HERNIA REPAIR      HYSTERECTOMY      KYPHOSIS SURGERY  10/27/2016    kyphoplasty L1 and L5    [place] COLSC FLX W/RMVL OF TUMOR POLYP LESION SNARE TQ N/A 4/10/2017    COLONOSCOPY POLYPECTOMY SNARE x2 with photos performed by Esha Romero MD at [facility] EGD TRANSORAL BIOPSY SINGLE/MULTIPLE N/A 4/10/2017    EGD BIOPSY x2 with photos performed by Esha Romero MD at [address] [place] [address] ARTHROSCOPY Right     x 2    TUBAL LIGATION      UPPER GASTROINTESTINAL ENDOSCOPY  04/10/2017    schatzki's ring; chronic peptic duodenitis       Social History:    Social History     Tobacco Use    Smoking status: Current Every Day Smoker     Packs/day: 1.00     Years: 65.00     Pack years: 65.00     Types: Cigarettes    Smokeless tobacco: Never Used   Substance Use Topics    Alcohol use:  Yes     Alcohol/week: 7.0 standard drinks     Types: 7 Shots of liquor per week     Comment: [place] with [place] (1/2 and 1/2) daily Ready to quit: Not Answered  Counseling given: Not Answered      Vital Signs (Current):   Vitals:    06/25/21 1242   BP: (!) 133/90   Pulse: 75   Resp: 12   Temp: 97.8 °F (36.6 °C)   TempSrc: Tympanic   Weight: 131 lb (59.4 kg)   Height: 5' 1\" (1.549 m)                                              BP Readings from Last 3 Encounters:   06/25/21 (!) 133/90   06/24/21 116/72   06/19/21 113/68       NPO Status:                                                                                 BMI:   Wt Readings from Last 3 Encounters:   06/25/21 131 lb (59.4 kg)   06/24/21 131 lb 3.2 oz (59.5 kg)   06/22/21 133 lb (60.3 kg)     Body mass index is 24.75 kg/m². CBC:   Lab Results   Component Value Date    WBC 8.7 05/03/2021    RBC 4.40 05/03/2021    RBC 4.78 06/04/2018    HGB 13.4 05/03/2021    HCT 40.0 05/03/2021    MCV 90.8 05/03/2021    RDW 14.9 05/03/2021     05/03/2021       CMP:   Lab Results   Component Value Date     05/03/2021    K 4.9 05/03/2021    CL 89 05/03/2021    CO2 27 05/03/2021    BUN 10 05/03/2021    CREATININE 0.76 05/03/2021    GFRAA >60 05/03/2021    LABGLOM >60 05/03/2021    GLUCOSE 119 05/03/2021    GLUCOSE 76 06/04/2018    PROT 7.4 05/03/2021    CALCIUM 10.0 05/03/2021    BILITOT 0.36 05/03/2021    BILITOT NEGATIVE 05/09/2018    ALKPHOS 71 05/03/2021    AST 19 05/03/2021    ALT <5 05/03/2021       POC Tests: No results for input(s): POCGLU, POCNA, POCK, POCCL, POCBUN, POCHEMO, POCHCT in the last 72 hours.     Coags:   Lab Results   Component Value Date    PROTIME 11.0 02/28/2018    INR 1.1 02/28/2018       HCG (If Applicable): No results found for: PREGTESTUR, PREGSERUM, HCG, HCGQUANT     ABGs: No results found for: PHART, PO2ART, JXO0EGF, WHQ7EYD, BEART, F6JPSBNR     Type & Screen (If Applicable):  No results found for: LABABO, LABRH    Drug/Infectious Status (If Applicable):  No results found for: HIV, HEPCAB    COVID-19 Screening (If Applicable):   Lab Results   Component Value Date    COVID19 Not Detected 05/06/2021           Anesthesia Evaluation  Patient summary reviewed and Nursing notes reviewed no history of anesthetic complications:   Airway: Mallampati: IV  TM distance: >3 FB   Neck ROM: full  Mouth opening: > = 3 FB Dental: normal exam         Pulmonary:   (+) COPD:  decreased breath sounds,  wheezes,  current smoker                           Cardiovascular:Negative CV ROS                      Neuro/Psych:   (+) seizures: poorly controlled, depression/anxiety             GI/Hepatic/Renal:   (+) PUD,           Endo/Other:    (+) hypothyroidism::., .                 Abdominal:             Vascular: Other Findings:           Anesthesia Plan      general and regional     ASA 3     (Brachial plexus block)  Induction: intravenous. MIPS: Postoperative opioids intended and Prophylactic antiemetics administered. Anesthetic plan and risks discussed with patient. Plan discussed with CRNA.                   Zadie Heimlich, MD   6/25/2021

## 2021-06-28 ENCOUNTER — CARE COORDINATION (OUTPATIENT)
Dept: CARE COORDINATION | Age: 76
End: 2021-06-28

## 2021-06-28 SDOH — HEALTH STABILITY: PHYSICAL HEALTH: ON AVERAGE, HOW MANY DAYS PER WEEK DO YOU ENGAGE IN MODERATE TO STRENUOUS EXERCISE (LIKE A BRISK WALK)?: 3 DAYS

## 2021-06-28 SDOH — HEALTH STABILITY: PHYSICAL HEALTH: ON AVERAGE, HOW MANY MINUTES DO YOU ENGAGE IN EXERCISE AT THIS LEVEL?: 10 MIN

## 2021-06-28 ASSESSMENT — ENCOUNTER SYMPTOMS: DYSPNEA ASSOCIATED WITH: EXERTION

## 2021-06-28 NOTE — CARE COORDINATION
Ambulatory Care Coordination Note  6/28/2021  CM Risk Score: 5  Charlson 10 Year Mortality Risk Score: 47%     ACC: Krissy Restrepo RN    Summary Note: Spoke with patient. Referred by PCP for care management to help with housing issues, is getting evicted due to bedbugs in her apartment. Health  also involved. She has COPD, tremors, chronic back pain, history ulcer. She is a smoker and has one alcoholic drink daily, used to drink a lot more. She has a home health nurse organize her pills every week, didn't want to review them today. She's had falls lately, had surgery left little finger last week due to a fall. [facility]. Patient has nursing visits from [facility]. Had [facility] for HHA but they dropped her probably since patient hasn't been there for visits due to being gone checking out apartments-she's not notifying them, and the bedbug issue since apartment hasn't been treated, they are still there. She spoke with patient today, told her to check into assisted living facilities because patient not able to do much for herself, may have trouble getting into other apartments due to bedbug problem. She was supposed to move furniture away from walls for [employer] company but she did not do that so they wouldn't treat her apartment. She has to move, isn't getting any more chances by  because she already had a couple of warnings and didn't follow through with instructions. Was told by  to file complaint with Adult Protective Services, and  also made referral but hasn't heard back.  asked writer to call and check on APS referral. Writer called, left message for  Maryam Parrish at  [place] [address] to call me back. Patient lives with . According to ,  is older, 80, feeble and forgetful. He drives them to appts, she doesn't drive. She has meal delivery once a week.  She uses a walker (KLONOPIN) 0.25 MG disintegrating tablet PLACE ONE TABLET BY MOUTH THREE TIMES A DAY AS NEEDED FOR TREMORS FOR UP TO 30 DAYS 6/17/21 7/17/21  Lilly Patricio MD   triamcinolone (KENALOG) 0.1 % cream Apply topically 2 times daily. 6/16/21   Genesis Anderson PA-C   pantoprazole (PROTONIX) 40 MG tablet TAKE ONE TABLET BY MOUTH DAILY 6/11/21   Lilly Patricio MD   ondansetron [facility]) 4 MG tablet Take every six hours as needed 5/10/21   Jermainel Phoenix, MD   calcium carbonate (OSCAL) 500 MG TABS tablet Take 500 mg by mouth daily    Historical Provider, MD   albuterol sulfate  (90 Base) MCG/ACT inhaler INHALE ONE TO TWO PUFFS BY MOUTH EVERY 4 HOURS AS NEEDED FOR WHEEZING OR SHORTNESS OF BREATH. SPACE OUT TO EVERY 6 HOURS AS SYMPTOMS IMPROVE 2/18/21   Lilly Patricio MD   vitamin B-12 (CYANOCOBALAMIN) 1000 MCG tablet TAKE ONE TABLET BY MOUTH DAILY 1/6/21   Lilly Patricio MD   cycloSPORINE (RESTASIS) 0.05 % ophthalmic emulsion Apply 1 drop to eye 2 times daily 12/14/20 12/14/21  Historical Provider, MD   citalopram (CELEXA) 40 MG tablet Take 1 tablet by mouth daily 12/31/20   Lilly Patricio MD   nystatin (MYCOSTATIN) 784638 UNIT/ML suspension Take 5 mLs by mouth 4 times daily Indications: thrush Swoosh and swallow for thrush 12/21/20   Lilly Patricio MD   Handicap Placard MISC by Does not apply route Exp - 10/21/2025 10/21/20   Lilly Patricio MD   BREO ELLIPTA 200-25 MCG/INH AEPB inhaler INHALE ONE DOSE BY MOUTH DAILY 10/19/20   Lilly Patricio MD   STOOL SOFTENER 100 MG capsule TAKE ONE CAPSULE BY MOUTH DAILY 9/10/20   Lilly Patricio MD   levothyroxine (SYNTHROID) 50 MCG tablet TAKE ONE TABLET BY MOUTH DAILY - C/Casia 10, SKIP CHELSIE 9/3/20   Lilly Patricio MD   Misc. Devices (STEP N REST WALKER) MISC 1 each by Does not apply route continuous Seated, wheeled walker 8/26/20   Lilly aPtricio MD   nystatin (MYCOSTATIN) [id number] UNIT/GM cream Apply topically 3 times daily.  8/24/20   Blanco Adame Bernadette Rodas MD   nystatin (MYCOSTATIN) [id number] UNIT/GM powder Apply 3 times daily.  8/24/20   Osiel Winters MD   ibuprofen (ADVIL;MOTRIN) 200 MG tablet Take 400 mg by mouth every 6 hours as needed for Pain    Historical Provider, MD   guaiFENesin (MUCINEX) 600 MG extended release tablet Take 1 tablet by mouth 2 times daily 12/3/19   Osiel Winters MD   denosumab (PROLIA) 60 MG/ML SOSY SC injection Inject 60 mg into the skin every 6 months Indications: next due Jan 2020    Historical Provider, MD   polyvinyl alcohol-povidone (CLEAR EYES NATURAL TEARS) 5-6 MG/ML SOLN opthalmic solution Place 1 drop into both eyes as needed for Dry Eyes     Historical Provider, MD   Menthol (BIOFREEZE) 10 % AERO Apply topically daily as needed Indications: applies to back, shoulders and knees     Historical Provider, MD   folic acid (FOLVITE) 1 MG tablet Take 1 tablet by mouth daily 9/9/19   Clif Curiel MD   Vitamin D (CHOLECALCIFEROL) 1000 UNITS CAPS capsule Take 1,000 Units by mouth daily    Historical Provider, MD       Future Appointments   Date Time Provider Junaid Candi   7/6/2021  3:00 PM Osiel Winters MD [facility]   7/7/2021 12:00 [facility]   7/8/2021 10:10 AM Mario Calero MD \[facility] [place] [facility]\"" [facility]

## 2021-07-01 ENCOUNTER — CARE COORDINATION (OUTPATIENT)
Dept: CARE COORDINATION | Age: 76
End: 2021-07-01

## 2021-07-01 NOTE — CARE COORDINATION
Smoking cessation packet, COPD zone tool, Mychart, right care right place, walk-in clinic sheets was mailed out to pt.

## 2021-07-01 NOTE — CARE COORDINATION
Phoned patient today to follow up on her housing dilemma. She states that they have found an apartment that they can move into on a lower   level. She reports that she a bit concerned because there are stairs that   she will have to go down to get to her apartment.   HC again inquired if her  Children will allow them to

## 2021-07-07 ENCOUNTER — OFFICE VISIT (OUTPATIENT)
Dept: PSYCHOLOGY | Age: 76
End: 2021-07-07
Payer: COMMERCIAL

## 2021-07-07 ENCOUNTER — CARE COORDINATION (OUTPATIENT)
Dept: CARE COORDINATION | Age: 76
End: 2021-07-07

## 2021-07-07 DIAGNOSIS — F33.2 SEVERE EPISODE OF RECURRENT MAJOR DEPRESSIVE DISORDER, WITHOUT PSYCHOTIC FEATURES (HCC): Primary | ICD-10-CM

## 2021-07-07 PROCEDURE — 90791 PSYCH DIAGNOSTIC EVALUATION: CPT | Performed by: SOCIAL WORKER

## 2021-07-07 ASSESSMENT — ENCOUNTER SYMPTOMS: DYSPNEA ASSOCIATED WITH: EXERTION

## 2021-07-07 NOTE — CARE COORDINATION
Ambulatory Care Coordination Note  7/7/2021  CM Risk Score: 5  Charlson 10 Year Mortality Risk Score: 47%     ACC: Andrew Luo, LARRY    Summary Note: They found a new apartment but can't move in until August 1, supposed to be out of current apartment today. She is very anxious, short of breath, stomach doesn't feel good. Has used albuterol inhaler but needs a refill. Her children will help them move but told her not to do anything because apartment complex hasn't filed a legal eviction notice yet. She doesn't know what to do. Has behavioral health appt scheduled today but is afraid to leave her apartment, thinks the  can come and change locks or something so wouldn't get back in. She called and left VM message on [address] phone with APS but no response yet. Tip Brooks visited them last week. Writer also left VM message on Tara's phone asking for direction to tell patient what to do. Her  has application from [employer] on Aging to fill out for Medicaid so they could get into a nursing home together, but they don't know how to fill it out, need help. She stated  is in denial, won't even look at the papers to fill out, he's not helping her do anything to get them moved. Gave patient phone number for Mitesh Espinal to call ([phone]) to ask about temporary shelters they could go to since getting into a nursing home is a process that can't be completed in a day. Spoke with lashanda Mccrary from [facility], she suggested patient take 's Medicaid application with her to [facility] appt so can get help to fill it out and have them fax to [facility]. Patient is eligible right now to get into a SNF temporarily until they can get into apartment but  is not, he would have to go to a shelter since none of his children are willing to let him stay with them.  Carmen Wing stated they do not have a legal court eviction yet but have warned them they need to vacate apartment before gets to that point since would be harder to get housing in the future with a court appointed eviction in their file. Patient stated her daughter is coming over soon, I suggested daughter take her to General acute hospital appointment so  can stay home. She will ask her daughter to drive her to General acute hospital appt today so  can stay home, she will take application with her and ask counselor for help. CC Plan:   -Follow up next week to check on moving progress or go to nursing home. Michelle Matthew will keep me updated. COPD Assessment    Does the patient understand envrionmental exposure?: Yes  Is the patient able to verbalize Rescue vs. Long Acting medications?: Yes  Does the patient have a nebulizer?: No  Does the patient use a space with inhaled medications?: No            Symptoms:     Symptom course: stable  Breathlessness: exertion  Increase use of rapid acting/rescue inhaled medications?: Yes  Change in chronic cough?: No/At Baseline  Change in sputum?: No/At Baseline  Sputum characteristics: Unable to Specify  Self Monitoring - SaO2: No  Have you had a recent diagnosis of pneumonia either by PCP or at a hospital?: No      and   General Assessment    Do you have any symptoms that are causing concern?: Yes  Progression since Onset: Gradually Worsening  Reported Symptoms: Other, Shortness of Breath (Comment: anxiety)             Care Coordination Interventions    Program Enrollment: Complex Care  Referral from Primary Care Provider: Yes  Suggested Interventions and Community Resources         Goals Addressed                 This Visit's Progress     Community Resource Goal   Improving     Patient will soon be evicted from her apartment, and needs help finding housing.     Barriers: fear of failure, lack of motivation, financial, lack of support, overwhelmed by complexity of regimen, stress, time constraints, medication side effects, and lack of education    Plan for overcoming my barriers[facility]. will assist patient with information for housing    Confidence: 8/10    Anticipated Goal Completion Date: 07/22/21            Prior to Admission medications    Medication Sig Start Date End Date Taking? Authorizing Provider   pantoprazole (PROTONIX) 40 MG tablet TAKE ONE TABLET BY MOUTH DAILY 6/11/21  Yes Valentine Robledo MD   STOOL SOFTENER 100 MG capsule TAKE ONE CAPSULE BY MOUTH DAILY 9/10/20  Yes Valentine Robledo MD   meloxicam (MOBIC) 15 MG tablet TAKE ONE TABLET BY MOUTH DAILY 6/24/21   Ulises Gómez PA-C   clonazePAM (KLONOPIN) 0.25 MG disintegrating tablet PLACE ONE TABLET BY MOUTH THREE TIMES A DAY AS NEEDED FOR TREMORS FOR UP TO 30 DAYS 6/17/21 7/17/21  Valentine Robledo MD   triamcinolone (KENALOG) 0.1 % cream Apply topically 2 times daily. 6/16/21   Tg Culver PA-C   ondansetron [facility]) 4 MG tablet Take every six hours as needed 5/10/21   Carlos A Quesada MD   calcium carbonate (OSCAL) 500 MG TABS tablet Take 500 mg by mouth daily    Historical Provider, MD   albuterol sulfate  (90 Base) MCG/ACT inhaler INHALE ONE TO TWO PUFFS BY MOUTH EVERY 4 HOURS AS NEEDED FOR WHEEZING OR SHORTNESS OF BREATH.  SPACE OUT TO EVERY 6 HOURS AS SYMPTOMS IMPROVE 2/18/21   Valentine Robledo MD   vitamin B-12 (CYANOCOBALAMIN) 1000 MCG tablet TAKE ONE TABLET BY MOUTH DAILY 1/6/21   Valentine Robledo MD   cycloSPORINE (RESTASIS) 0.05 % ophthalmic emulsion Apply 1 drop to eye 2 times daily 12/14/20 12/14/21  Historical Provider, MD   citalopram (CELEXA) 40 MG tablet Take 1 tablet by mouth daily 12/31/20   Valentine Robledo MD   nystatin (MYCOSTATIN) [id number] UNIT/ML suspension Take 5 mLs by mouth 4 times daily Indications: thrush Swoosh and swallow for thrush 12/21/20   Valentine Robledo MD   Handicap Placard MISC by Does not apply route Exp - 10/21/2025 10/21/20   Valentine Robledo MD   BREO ELLIPTA 200-25 MCG/INH AEPB inhaler INHALE ONE DOSE BY MOUTH DAILY 10/19/20   Valentine Robledo MD   levothyroxine (SYNTHROID) 50 MCG tablet TAKE ONE TABLET BY MOUTH [address], SKIP CHELSIE 9/3/20   Nitin Ziegler MD   [facility]. Devices (STEP N REST WALKER) MISC 1 each by Does not apply route continuous Seated, wheeled walker 8/26/20   Nitin Ziegler MD   nystatin (MYCOSTATIN) [id number] UNIT/GM cream Apply topically 3 times daily. 8/24/20   Nitin Ziegler MD   nystatin (MYCOSTATIN) [id number] UNIT/GM powder Apply 3 times daily.  8/24/20   Nitin Ziegler MD   ibuprofen (ADVIL;MOTRIN) 200 MG tablet Take 400 mg by mouth every 6 hours as needed for Pain    Historical Provider, MD   guaiFENesin (MUCINEX) 600 MG extended release tablet Take 1 tablet by mouth 2 times daily 12/3/19   Nitin Ziegler MD   denosumab (PROLIA) 60 MG/ML SOSY SC injection Inject 60 mg into the skin every 6 months Indications: next due Jan 2020    Historical Provider, MD   polyvinyl alcohol-povidone (CLEAR EYES NATURAL TEARS) 5-6 MG/ML SOLN opthalmic solution Place 1 drop into both eyes as needed for Dry Eyes     Historical Provider, MD   Menthol (BIOFREEZE) 10 % AERO Apply topically daily as needed Indications: applies to back, shoulders and knees     Historical Provider, MD   folic acid (FOLVITE) 1 MG tablet Take 1 tablet by mouth daily 9/9/19   Brenda Giraldo MD   Vitamin D (CHOLECALCIFEROL) 1000 UNITS CAPS capsule Take 1,000 Units by mouth daily    Historical Provider, MD       Future Appointments   Date Time Provider Junaid Christopher   7/7/2021 12:00 PM LAVERNE Flanagan psy [facility]   7/8/2021 10:00 AM Ouida Hardy, PA [facility]
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
## 2021-07-08 ENCOUNTER — TELEPHONE (OUTPATIENT)
Dept: ORTHOPEDIC SURGERY | Age: 76
End: 2021-07-08

## 2021-07-08 ENCOUNTER — OFFICE VISIT (OUTPATIENT)
Dept: ORTHOPEDIC SURGERY | Age: 76
End: 2021-07-08

## 2021-07-08 DIAGNOSIS — S62.317A DISPLACED FRACTURE OF BASE OF FIFTH METACARPAL BONE, LEFT HAND, INITIAL ENCOUNTER FOR CLOSED FRACTURE: Primary | ICD-10-CM

## 2021-07-08 PROCEDURE — 99024 POSTOP FOLLOW-UP VISIT: CPT | Performed by: PHYSICIAN ASSISTANT

## 2021-07-08 NOTE — TELEPHONE ENCOUNTER
LVM with patient stating that our office was notified that the patient was dropped off at our [facility] location instead of the [facility] office.  Unfortunately Mario Alberto Lopes will not be able to see the patient today and would like the patient to be rescheduled to either 7/9 in [address] [place] [address] or 7/12 in [facility]

## 2021-07-13 ENCOUNTER — CARE COORDINATION (OUTPATIENT)
Dept: CARE COORDINATION | Age: 76
End: 2021-07-13

## 2021-07-14 RX ORDER — ALBUTEROL SULFATE 90 UG/1
AEROSOL, METERED RESPIRATORY (INHALATION)
Qty: 18 G | Refills: 2 | Status: SHIPPED | OUTPATIENT
Start: 2021-07-14

## 2021-07-14 NOTE — PROGRESS NOTES
wrist.   Swelling:  mild       Finger ROM:  Patient with excellent range of motion of first through fourth digits. Mild limitation in fifth digit flexion. Wrist ROM:   not tested    Elbow ROM:  normal and painless   Sensation:   intact to light touch         Neurological: Patient is alert and oriented to person, place, and time. Normal strenght. No sensory deficit. Skin: Skin is warm and dry  Psychiatric: Behavior is normal. Thought content normal.  Nursing note and vitals reviewed. Labs and Imaging:     XR taken today:  No results found. X-rays taken in clinic and preliminarily reviewed by me:  3 views of the left hand demonstrates patient is status post closed reduction and percutaneous pinning of the left fifth metacarpal base fracture. Compared with fluoroscopy there does not appear to be any significant interval changes. Pins appear in appropriate position. Assessment and Plan:  1. Displaced fracture of base of fifth metacarpal bone, left hand, initial encounter for closed fracture              PLAN:  This is a 76 y.o. female who presents to the clinic today for follow up status post closed reduction percutaneous pinning of left fifth metacarpal base fracture on 6/25/2021.  1.  Patient overall is doing well postoperatively x-rays demonstrate no significant interval change compared to fluoroscopy images intraoperatively. Pin sites appear excellent without any signs of obvious infection. These are clean today. 2.  Pin sites are well-padded and patient is placed in a short arm fiberglass cast  3. Educated on appropriate cast care instructed to keep it clean and dry  4. Follow-up with Dr. Stella Milian in 2 weeks for repeat x-rays. Patient is educated that if there is significant healing at that time pins may be removed however if there is not healing they will likely be kept in for 2 more weeks following that appointment.     All question concerns addressed today we will see patient back in my clinic on a as needed basis. Please note that this chart was generated using voice recognition Dragon dictation software. Although every effort was made to ensure the accuracy of this automated transcription, some errors in transcription may have occurred.

## 2021-07-16 ENCOUNTER — CARE COORDINATION (OUTPATIENT)
Dept: CARE COORDINATION | Age: 76
End: 2021-07-16

## 2021-07-16 ENCOUNTER — TELEPHONE (OUTPATIENT)
Dept: PRIMARY CARE CLINIC | Age: 76
End: 2021-07-16

## 2021-07-16 DIAGNOSIS — N30.00 ACUTE CYSTITIS WITHOUT HEMATURIA: ICD-10-CM

## 2021-07-16 RX ORDER — AMOXICILLIN 250 MG/1
CAPSULE ORAL
Qty: 21 CAPSULE | Refills: 0 | Status: SHIPPED | OUTPATIENT
Start: 2021-07-16 | End: 2021-11-01 | Stop reason: ALTCHOICE

## 2021-07-16 NOTE — CARE COORDINATION
Received message that home health stopped making visits due to bed bugs. The home health nurse had been organizing her medications. Spoke with Ruthann Solo her [address] [facility] , she is working on getting the apartment treated, she will call and speak with Leon to find out when last the medication containers were filled and call back with update.

## 2021-07-20 ENCOUNTER — CARE COORDINATION (OUTPATIENT)
Dept: CARE COORDINATION | Age: 76
End: 2021-07-20

## 2021-07-20 ASSESSMENT — LIFESTYLE VARIABLES
HOW OFTEN DO YOU HAVE A DRINK CONTAINING ALCOHOL: 4 OR MORE TIMES A WEEK
HOW MANY STANDARD DRINKS CONTAINING ALCOHOL DO YOU HAVE ON A TYPICAL DAY: 1 OR 2

## 2021-07-20 NOTE — CARE COORDINATION
Ambulatory Care Coordination Note  7/20/2021  CM Risk Score: 5  Charlson 10 Year Mortality Risk Score: 47%     ACC: Janna Collins RN    Summary Note:  She and  now staying in present apartment until can get into new one 8/1, will not be going temporarily to a SNF before moving.  refuses to go into a nursing home, even temporarily. He won't sign the Medicaid application. Her children came to apartment and cleaned, decluttered. Silvia Gerard her  arranged for [employer] to come and exterminate the bedbugs. Easier to move furniture now since less clutter. She doesn't know when they're coming, waiting for a call. Have to arrange with  since sprinklers need to be turned off. She is currently smoking 1-2 cigarettes a day, has to go outside and will have to at new apartment also. Continues to drink 1 alcoholic drink each night. Reminded her PCP wants her to cut down clonazepam to once daily since also drinks alcohol regularly. Stated only takes 1-2 times a day now when feels shaky. Still having about one fall a week, doesn't really know why she falls. Has a bruise on hip right now from fall last week. Cast gets removed from left hand next week. No home health visits right now but has a medication list she has been following to organize her own pills. DOMINIQUEW she saw recommended she establish with a regular counselor. CC Plan:   -Follow up next week to check on status of extermination, speak with  about mental health counselor. General Assessment    Do you have any symptoms that are causing concern?: Yes  Progression since Onset: Unchanged  Reported Symptoms: Other (Comment: falls)                 Care Coordination Interventions    Program Enrollment: Complex Care  Referral from Primary Care Provider: Yes  Suggested Interventions and Community Resources         Goals Addressed    None         Prior to Admission medications    Medication Sig Start Date End Date Taking? Authorizing Provider   clonazePAM (KLONOPIN) 0.25 MG disintegrating tablet PLACE ONE TABLET BY MOUTH THREE TIMES A DAY AS NEEDED FOR TREMORS FOR UP TO 30 DAYS 7/19/21 8/18/21  Shahla Kim MD   amoxicillin (AMOXIL) 250 MG capsule TAKE ONE CAPSULE BY MOUTH THREE TIMES A DAY FOR 7 DAYS 7/16/21   Shahla Kim MD   triamcinolone (KENALOG) 0.1 % cream APPLY TOPICALLY TWO TIMES A DAY 7/16/21   Shahla Kim MD   albuterol sulfate  (90 Base) MCG/ACT inhaler INHALE ONE TO TWO PUFFS BY MOUTH EVERY 4 HOURS AS NEEDED FOR WHEEZING OR SHORTNESS OF BREATH.  SPACE OUT TO EVERY 6 HOURS AS SYMPTOMS IMPROVE 7/14/21   Shahla Kim MD   meloxicam (MOBIC) 15 MG tablet TAKE ONE TABLET BY MOUTH DAILY 6/24/21   Daniel Salinas PA-C   pantoprazole (PROTONIX) 40 MG tablet TAKE ONE TABLET BY MOUTH DAILY 6/11/21   Shahla Kim MD   ondansetron [facility]) 4 MG tablet Take every six hours as needed 5/10/21   Myra Worthy MD   calcium carbonate (OSCAL) 500 MG TABS tablet Take 500 mg by mouth daily    Historical Provider, MD   vitamin B-12 (CYANOCOBALAMIN) 1000 MCG tablet TAKE ONE TABLET BY MOUTH DAILY 1/6/21   Shahla Kim MD   cycloSPORINE (RESTASIS) 0.05 % ophthalmic emulsion Apply 1 drop to eye 2 times daily 12/14/20 12/14/21  Historical Provider, MD   citalopram (CELEXA) 40 MG tablet Take 1 tablet by mouth daily 12/31/20   Shahla Kim MD   nystatin (MYCOSTATIN) 296316 UNIT/ML suspension Take 5 mLs by mouth 4 times daily Indications: thrush Swoosh and swallow for thrush 12/21/20   Shahla Kim MD   Handicap Placard MISC by Does not apply route Exp - 10/21/2025 10/21/20   Shahla Kim MD   BREO ELLIPTA 200-25 MCG/INH AEPB inhaler INHALE ONE DOSE BY MOUTH DAILY 10/19/20   Shahla Kim MD   STOOL SOFTENER 100 MG capsule TAKE ONE CAPSULE BY MOUTH DAILY 9/10/20   Shahla Kim MD   levothyroxine (SYNTHROID) 50 MCG tablet TAKE ONE TABLET BY MOUTH DAILY - [address] [facility]

## 2021-07-22 ENCOUNTER — OFFICE VISIT (OUTPATIENT)
Dept: ORTHOPEDIC SURGERY | Age: 76
End: 2021-07-22

## 2021-07-22 VITALS — HEIGHT: 61 IN | RESPIRATION RATE: 18 BRPM | BODY MASS INDEX: 24.73 KG/M2 | WEIGHT: 131 LBS

## 2021-07-22 DIAGNOSIS — S62.317A DISPLACED FRACTURE OF BASE OF FIFTH METACARPAL BONE, LEFT HAND, INITIAL ENCOUNTER FOR CLOSED FRACTURE: Primary | ICD-10-CM

## 2021-07-22 PROCEDURE — 99024 POSTOP FOLLOW-UP VISIT: CPT | Performed by: ORTHOPAEDIC SURGERY

## 2021-07-22 NOTE — PROGRESS NOTES
Patient ID: Estella Patricio is a 76 y.o. female    Chief Compliant:  Chief Complaint   Patient presents with    Post-Op Check     orif lt 5th  fx 6/25/21        HPI:  This is a 76 y.o. female who presents to the clinic today 4 weeks post ORIF LT 5th [address] 6/25/21     Patient is recovering well post surgery     Review of Systems   All other systems reviewed and are negative. Past History:    Current Outpatient Medications:     clonazePAM (KLONOPIN) 0.25 MG disintegrating tablet, PLACE ONE TABLET BY MOUTH THREE TIMES A DAY AS NEEDED FOR TREMORS FOR UP TO 30 DAYS, Disp: 30 tablet, Rfl: 0    amoxicillin (AMOXIL) 250 MG capsule, TAKE ONE CAPSULE BY MOUTH THREE TIMES A DAY FOR 7 DAYS, Disp: 21 capsule, Rfl: 0    triamcinolone (KENALOG) 0.1 % cream, APPLY TOPICALLY TWO TIMES A DAY, Disp: 45 g, Rfl: 0    albuterol sulfate  (90 Base) MCG/ACT inhaler, INHALE ONE TO TWO PUFFS BY MOUTH EVERY 4 HOURS AS NEEDED FOR WHEEZING OR SHORTNESS OF BREATH.  SPACE OUT TO EVERY 6 HOURS AS SYMPTOMS IMPROVE, Disp: 18 g, Rfl: 2    meloxicam (MOBIC) 15 MG tablet, TAKE ONE TABLET BY MOUTH DAILY, Disp: 30 tablet, Rfl: 2    pantoprazole (PROTONIX) 40 MG tablet, TAKE ONE TABLET BY MOUTH DAILY, Disp: 90 tablet, Rfl: 0    ondansetron (ZOFRAN) 4 MG tablet, Take every six hours as needed, Disp: 20 tablet, Rfl: 0    calcium carbonate (OSCAL) 500 MG TABS tablet, Take 500 mg by mouth daily, Disp: , Rfl:     vitamin B-12 (CYANOCOBALAMIN) 1000 MCG tablet, TAKE ONE TABLET BY MOUTH DAILY, Disp: 30 tablet, Rfl: 2    cycloSPORINE (RESTASIS) 0.05 % ophthalmic emulsion, Apply 1 drop to eye 2 times daily, Disp: , Rfl:     citalopram (CELEXA) 40 MG tablet, Take 1 tablet by mouth daily, Disp: 30 tablet, Rfl: 5    nystatin (MYCOSTATIN) 924362 UNIT/ML suspension, Take 5 mLs by mouth 4 times daily Indications: thrush Swoosh and swallow for thrush, Disp: 473 mL, Rfl: 0    Handicap Placard MISC, by Does not apply route Exp - 10/21/2025, Disp: 1 each, Rfl: 0    BREO ELLIPTA 200-25 MCG/INH AEPB inhaler, INHALE ONE DOSE BY MOUTH DAILY, Disp: 60 each, Rfl: 2    STOOL SOFTENER 100 MG capsule, TAKE ONE CAPSULE BY MOUTH DAILY, Disp: 30 capsule, Rfl: 2    levothyroxine (SYNTHROID) 50 MCG tablet, TAKE ONE TABLET BY MOUTH DAILY - MONDAY THROUGH SATURDAY, SKIP SUNDAY, Disp: 77 tablet, Rfl: 3    Misc. Devices (STEP N REST WALKER) MISC, 1 each by Does not apply route continuous Seated, wheeled walker, Disp: 1 each, Rfl: 0    nystatin (MYCOSTATIN) 266616 UNIT/GM cream, Apply topically 3 times daily. , Disp: 30 g, Rfl: 5    nystatin (MYCOSTATIN) 580170 UNIT/GM powder, Apply 3 times daily. , Disp: 60 g, Rfl: 5    ibuprofen (ADVIL;MOTRIN) 200 MG tablet, Take 400 mg by mouth every 6 hours as needed for Pain, Disp: , Rfl:     guaiFENesin (MUCINEX) 600 MG extended release tablet, Take 1 tablet by mouth 2 times daily, Disp: , Rfl:     denosumab (PROLIA) 60 MG/ML SOSY SC injection, Inject 60 mg into the skin every 6 months Indications: next due Jan 2020, Disp: , Rfl:     polyvinyl alcohol-povidone (CLEAR EYES NATURAL TEARS) 5-6 MG/ML SOLN opthalmic solution, Place 1 drop into both eyes as needed for Dry Eyes , Disp: , Rfl:     Menthol (BIOFREEZE) 10 % AERO, Apply topically daily as needed Indications: applies to back, shoulders and knees , Disp: , Rfl:     folic acid (FOLVITE) 1 MG tablet, Take 1 tablet by mouth daily, Disp: 30 tablet, Rfl: 3    Vitamin D (CHOLECALCIFEROL) 1000 UNITS CAPS capsule, Take 1,000 Units by mouth daily, Disp: , Rfl:   Allergies   Allergen Reactions    Keflex [Cephalexin]      Diarrhea     Other      Seasonal allergies.      Bactrim [Sulfamethoxazole-Trimethoprim] Diarrhea and Nausea And Vomiting    Motrin [Ibuprofen] Diarrhea           Sulfa Antibiotics Diarrhea and Nausea And Vomiting    Wellbutrin [Bupropion] Other (See Comments)     Lightheaded, Dizziness     Social History     Socioeconomic History    Marital status:  Spouse name: Not on file    Number of children: Not on file    Years of education: Not on file    Highest education level: Not on file   Occupational History    Not on file   Tobacco Use    Smoking status: Current Every Day Smoker     Packs/day: 0.25     Years: 65.00     Pack years: 16.25     Types: Cigarettes    Smokeless tobacco: Never Used   Vaping Use    Vaping Use: Never used   Substance and Sexual Activity    Alcohol use: Yes     Alcohol/week: 7.0 standard drinks     Types: 7 Shots of liquor per week     Comment: lani with ilya (1/2 and 1/2) daily    Drug use: No    Sexual activity: Not Currently     Partners: Male   Other Topics Concern    Not on file   Social History Narrative    Not on file     Social Determinants of Health     Financial Resource Strain: Low Risk     Difficulty of Paying Living Expenses: Not very hard   Food Insecurity: No Food Insecurity    Worried About Running Out of Food in the Last Year: Never true    Radha of Food in the Last Year: Never true   Transportation Needs: No Transportation Needs    Lack of Transportation (Medical): No    Lack of Transportation (Non-Medical):  No   Physical Activity: Insufficiently Active    Days of Exercise per Week: 3 days    Minutes of Exercise per Session: 10 min   Stress:     Feeling of Stress :    Social Connections:     Frequency of Communication with Friends and Family:     Frequency of Social Gatherings with Friends and Family:     Attends Hindu Services:     Active Member of Clubs or Organizations:     Attends Club or Organization Meetings:     Marital Status:    Intimate Partner Violence:     Fear of Current or Ex-Partner:     Emotionally Abused:     Physically Abused:     Sexually Abused:      Past Medical History:   Diagnosis Date    Alcoholism ([facility] 10/20/2016    Anxiety     Cataract     Chronic pain     COPD (chronic obstructive pulmonary disease) ([facility]     Depression     Hematochezia 4/7/2017    Hypothyroid     Osteoporosis     Peptic ulcer of duodenum     chronic    Schatzki's ring     Sessile rectal polyp 04/10/2017    multi tiny    Tobacco abuse     Tubular adenoma of colon 04/10/2017    x2     Past Surgical History:   Procedure Laterality Date    CARPAL TUNNEL RELEASE Right     CATARACT REMOVAL      CHOLECYSTECTOMY, LAPAROSCOPIC N/A 5/10/2021    CHOLECYSTECTOMY LAPAROSCOPIC ROBOTIC XI performed by Chintan Mohr MD at [facility]  04/10/2017    multi tiny sessile polyps; poor prep; tubular adenoma x 2    FINGER FRACTURE SURGERY Left 06/25/2021    5TH METACARPAL OPEN REDUCTION INTERNAL FIXATION WITH SYNTHES     FINGER SURGERY Left 6/25/2021    LEFT 5TH METACARPAL OPEN REDUCTION INTERNAL FIXATION WITH SYNTHES performed by Alma Bruce MD at [facility]  10/27/2016    kyphoplasty L1 and L5    OR COLSC FLX W/RMVL OF TUMOR POLYP LESION SNARE TQ N/A 4/10/2017    COLONOSCOPY POLYPECTOMY SNARE x2 with photos performed by Kin Terrell MD at [facility] EGD TRANSORAL BIOPSY SINGLE/MULTIPLE N/A 4/10/2017    EGD BIOPSY x2 with photos performed by Kin Terrell MD at [facility] ARTHROSCOPY Right     x 2    TUBAL LIGATION      UPPER GASTROINTESTINAL ENDOSCOPY  04/10/2017    schatzki's ring; chronic peptic duodenitis     Family History   Problem Relation Age of Onset    Heart Disease Mother     Asthma Mother     Heart Disease Father         Physical Exam:  Vitals signs and nursing note reviewed. Constitutional:       Appearance: She is well-developed. HENT:      Head: Normocephalic and atraumatic. Nose: Nose normal.   Eyes:      Conjunctiva/sclera: Conjunctivae normal.   Neck:      Musculoskeletal: Normal range of motion and neck supple. Pulmonary:      Effort: Pulmonary effort is normal. No respiratory distress.    Musculoskeletal:      Comments: Normal gait     Skin:     General: Skin is warm and dry.   Neurological:      Mental Status: She is alert and oriented to person, place, and time. Sensory: No sensory deficit. Psychiatric:         Behavior: Behavior normal.         Thought Content: Thought content normal.        Diagnostic imaging:    AP lateral oblique left hand healing metadiaphyseal fracture small finger metacarpal base alignment remains good    Assessment and Plan:  1. Displaced fracture of base of fifth metacarpal bone, left hand, initial encounter for closed fracture      Cast and pins removed     Cock up wrist splint provided    Follow up in 3 weeks      Provider Attestation:  Rebecca Street, personally performed the services described in this documentation. All medical record entries made by the scribe were at my direction and in my presence. I have reviewed the chart and discharge instructions and agree that the records reflect my personal performance and is accurate and complete. Bo Cheng MD 7/22/21     Scribe Attestation:  By signing my name below, Nathen Romero, attest that this documentation has been prepared under the direction and in the presence of Dr. Martina Noriega. Electronically signed: Tigist Lynn, 7/22/21     Please note that this chart was generated using voice recognition Dragon dictation software. Although every effort was made to ensure the accuracy of this automated transcription, some errors in transcription may have occurred.

## 2021-07-30 ENCOUNTER — CARE COORDINATION (OUTPATIENT)
Dept: CARE COORDINATION | Age: 76
End: 2021-07-30

## 2021-07-30 NOTE — CARE COORDINATION
Spoke with , patient at new apartment since they are moving this weekend. Will call again next week.     Future Appointments   Date Time Provider Junaid Christopher   8/10/2021  4:20 PM MD TOMY Barrios

## 2021-08-06 ENCOUNTER — CARE COORDINATION (OUTPATIENT)
Dept: CARE COORDINATION | Age: 76
End: 2021-08-06

## 2021-08-06 NOTE — CARE COORDINATION
Left VM message asking patient to call me back at [phone] for care management, check if moved into new apartment and if home health visits have resumed. Will call again next week.     Future Appointments   Date Time Provider Junaid Christopher   8/10/2021  4:20 PM Shiva Evans MD [facility]

## 2021-08-09 ENCOUNTER — TELEPHONE (OUTPATIENT)
Dept: PRIMARY CARE CLINIC | Age: 76
End: 2021-08-09

## 2021-08-09 NOTE — TELEPHONE ENCOUNTER
----- Message from [place] sent at 8/6/2021 11:25 AM EDT -----  Subject: Message to Provider    QUESTIONS  Information for Provider? Patient is calling for assistance to set up her   pharmacy for home delivery and the doctor said she would help her with   this.  ---------------------------------------------------------------------------  --------------  [address]  What is the best way for the office to contact you? OK to leave message on   voicemail  Preferred Call Back Phone Number? [phone]  ---------------------------------------------------------------------------  --------------  SCRIPT ANSWERS  Relationship to Patient?  Self

## 2021-08-13 ENCOUNTER — CARE COORDINATION (OUTPATIENT)
Dept: CARE COORDINATION | Age: 76
End: 2021-08-13

## 2021-08-13 NOTE — CARE COORDINATION
Left VM message asking patient to call me back at [phone] for care management. Will call again next week.     Future Appointments   Date Time Provider Junaid Christopher   8/24/2021  4:20 PM MD TOMY Duarte

## 2021-08-18 ENCOUNTER — NURSE TRIAGE (OUTPATIENT)
Dept: OTHER | Facility: CLINIC | Age: 76
End: 2021-08-18

## 2021-08-18 ENCOUNTER — TELEPHONE (OUTPATIENT)
Dept: PRIMARY CARE CLINIC | Age: 76
End: 2021-08-18

## 2021-08-18 DIAGNOSIS — R25.1 TREMOR: ICD-10-CM

## 2021-08-18 RX ORDER — CLONAZEPAM 0.25 MG/1
0.25 TABLET, ORALLY DISINTEGRATING ORAL EVERY OTHER DAY
Qty: 10 TABLET | Refills: 0 | Status: SHIPPED | OUTPATIENT
Start: 2021-08-18 | End: 2021-09-01 | Stop reason: SDUPTHER

## 2021-08-18 NOTE — TELEPHONE ENCOUNTER
Pt possible withdrawal due to stopping benzo given small taper. Educated to go to the ER with any signs of seizure.   Come to the office if symptoms persist.

## 2021-08-20 ENCOUNTER — CARE COORDINATION (OUTPATIENT)
Dept: CARE COORDINATION | Age: 76
End: 2021-08-20

## 2021-08-20 NOTE — CARE COORDINATION
Left VM message asking patient to call me back at [phone] for care management. Spoke with Jaimie Rao mgr. She does not currently have home health visits because needs to treat new apartment first which is scheduled to be done next week then [facility] home health will continue with nursing visits. The aide visits may not resume and they may need to search for a new aide to visit her. Was given new address and phone number for patient, again tried to contact her on new number, message left to return call. Will call again in a few weeks to follow up.     Future Appointments   Date Time Provider Junaid Christopher   8/24/2021  4:20 PM Butch Omer MD [facility] [address] [facility] diff swallowing resolved before triage

## 2021-08-24 ENCOUNTER — OFFICE VISIT (OUTPATIENT)
Dept: ORTHOPEDIC SURGERY | Age: 76
End: 2021-08-24

## 2021-08-24 VITALS — WEIGHT: 131 LBS | RESPIRATION RATE: 16 BRPM | HEIGHT: 61 IN | BODY MASS INDEX: 24.73 KG/M2

## 2021-08-24 DIAGNOSIS — S62.317A DISPLACED FRACTURE OF BASE OF FIFTH METACARPAL BONE, LEFT HAND, INITIAL ENCOUNTER FOR CLOSED FRACTURE: Primary | ICD-10-CM

## 2021-08-24 PROCEDURE — 99024 POSTOP FOLLOW-UP VISIT: CPT | Performed by: ORTHOPAEDIC SURGERY

## 2021-08-24 NOTE — PROGRESS NOTES
Patient ID: Baldo Spurling is a 76 y.o. female    Chief Compliant:  No chief complaint on file. Diagnostic imaging:  AP lateral left hand status post small finger metacarpal base fracture/reverse Cruz's appears to be healing with a bit of a malunion      Assessment and Plan:  1. Displaced fracture of base of fifth metacarpal bone, left hand, initial encounter for closed fracture      Discontinue splint    Follow up in 2 months for clinical check      HPI:  This is a 76 y.o. female who presents to the clinic today for 2 months post left 5th [facility] ORIF 6/25/21. Patient is recovering as expected post ORIF however she is limited in her ring and small finger stiffness    Review of Systems   All other systems reviewed and are negative. Past History:    Current Outpatient Medications:     clonazePAM (KLONOPIN) 0.25 MG disintegrating tablet, Take 1 tablet by mouth every other day for 20 days. , Disp: 10 tablet, Rfl: 0    amoxicillin (AMOXIL) 250 MG capsule, TAKE ONE CAPSULE BY MOUTH THREE TIMES A DAY FOR 7 DAYS, Disp: 21 capsule, Rfl: 0    triamcinolone (KENALOG) 0.1 % cream, APPLY TOPICALLY TWO TIMES A DAY, Disp: 45 g, Rfl: 0    albuterol sulfate  (90 Base) MCG/ACT inhaler, INHALE ONE TO TWO PUFFS BY MOUTH EVERY 4 HOURS AS NEEDED FOR WHEEZING OR SHORTNESS OF BREATH.  SPACE OUT TO EVERY 6 HOURS AS SYMPTOMS IMPROVE, Disp: 18 g, Rfl: 2    meloxicam (MOBIC) 15 MG tablet, TAKE ONE TABLET BY MOUTH DAILY, Disp: 30 tablet, Rfl: 2    pantoprazole (PROTONIX) 40 MG tablet, TAKE ONE TABLET BY MOUTH DAILY, Disp: 90 tablet, Rfl: 0    ondansetron (ZOFRAN) 4 MG tablet, Take every six hours as needed, Disp: 20 tablet, Rfl: 0    calcium carbonate (OSCAL) 500 MG TABS tablet, Take 500 mg by mouth daily, Disp: , Rfl:     vitamin B-12 (CYANOCOBALAMIN) 1000 MCG tablet, TAKE ONE TABLET BY MOUTH DAILY, Disp: 30 tablet, Rfl: 2    cycloSPORINE (RESTASIS) 0.05 % ophthalmic emulsion, Apply 1 drop to eye 2 times daily, Disp: , Rfl:     citalopram (CELEXA) 40 MG tablet, Take 1 tablet by mouth daily, Disp: 30 tablet, Rfl: 5    nystatin (MYCOSTATIN) 351892 UNIT/ML suspension, Take 5 mLs by mouth 4 times daily Indications: thrush Swoosh and swallow for thrush, Disp: 473 mL, Rfl: 0    Handicap Placard MISC, by Does not apply route Exp - 10/21/2025, Disp: 1 each, Rfl: 0    BREO ELLIPTA 200-25 MCG/INH AEPB inhaler, INHALE ONE DOSE BY MOUTH DAILY, Disp: 60 each, Rfl: 2    STOOL SOFTENER 100 MG capsule, TAKE ONE CAPSULE BY MOUTH DAILY, Disp: 30 capsule, Rfl: 2    levothyroxine (SYNTHROID) 50 MCG tablet, TAKE ONE TABLET BY MOUTH DAILY - MONDAY THROUGH SATURDAY, SKIP SUNDAY, Disp: 77 tablet, Rfl: 3    Misc. Devices (STEP N REST WALKER) MISC, 1 each by Does not apply route continuous Seated, wheeled walker, Disp: 1 each, Rfl: 0    nystatin (MYCOSTATIN) 341122 UNIT/GM cream, Apply topically 3 times daily. , Disp: 30 g, Rfl: 5    nystatin (MYCOSTATIN) 484576 UNIT/GM powder, Apply 3 times daily. , Disp: 60 g, Rfl: 5    ibuprofen (ADVIL;MOTRIN) 200 MG tablet, Take 400 mg by mouth every 6 hours as needed for Pain, Disp: , Rfl:     guaiFENesin (MUCINEX) 600 MG extended release tablet, Take 1 tablet by mouth 2 times daily, Disp: , Rfl:     denosumab (PROLIA) 60 MG/ML SOSY SC injection, Inject 60 mg into the skin every 6 months Indications: next due Jan 2020, Disp: , Rfl:     polyvinyl alcohol-povidone (CLEAR EYES NATURAL TEARS) 5-6 MG/ML SOLN opthalmic solution, Place 1 drop into both eyes as needed for Dry Eyes , Disp: , Rfl:     Menthol (BIOFREEZE) 10 % AERO, Apply topically daily as needed Indications: applies to back, shoulders and knees , Disp: , Rfl:     folic acid (FOLVITE) 1 MG tablet, Take 1 tablet by mouth daily, Disp: 30 tablet, Rfl: 3    Vitamin D (CHOLECALCIFEROL) 1000 UNITS CAPS capsule, Take 1,000 Units by mouth daily, Disp: , Rfl:   Allergies   Allergen Reactions    Keflex [Cephalexin]      Diarrhea     Other Seasonal allergies.  Bactrim [Sulfamethoxazole-Trimethoprim] Diarrhea and Nausea And Vomiting    Motrin [Ibuprofen] Diarrhea           Sulfa Antibiotics Diarrhea and Nausea And Vomiting    Wellbutrin [Bupropion] Other (See Comments)     Lightheaded, Dizziness     Social History     Socioeconomic History    Marital status:      Spouse name: Not on file    Number of children: Not on file    Years of education: Not on file    Highest education level: Not on file   Occupational History    Not on file   Tobacco Use    Smoking status: Current Every Day Smoker     Packs/day: 0.25     Years: 65.00     Pack years: 16.25     Types: Cigarettes    Smokeless tobacco: Never Used   Vaping Use    Vaping Use: Never used   Substance and Sexual Activity    Alcohol use: Yes     Alcohol/week: 7.0 standard drinks     Types: 7 Shots of liquor per week     Comment: baileys with kaluah (1/2 and 1/2) daily    Drug use: No    Sexual activity: Not Currently     Partners: Male   Other Topics Concern    Not on file   Social History Narrative    Not on file     Social Determinants of Health     Financial Resource Strain: Low Risk     Difficulty of Paying Living Expenses: Not very hard   Food Insecurity: No Food Insecurity    Worried About Running Out of Food in the Last Year: Never true    Radha of Food in the Last Year: Never true   Transportation Needs: No Transportation Needs    Lack of Transportation (Medical): No    Lack of Transportation (Non-Medical):  No   Physical Activity: Insufficiently Active    Days of Exercise per Week: 3 days    Minutes of Exercise per Session: 10 min   Stress:     Feeling of Stress :    Social Connections:     Frequency of Communication with Friends and Family:     Frequency of Social Gatherings with Friends and Family:     Attends Tenriism Services:     Active Member of Clubs or Organizations:     Attends Club or Organization Meetings:     Marital Status:    Intimate Normocephalic and atraumatic. Nose: Nose normal.   Eyes:      Conjunctiva/sclera: Conjunctivae normal.   Neck:      Musculoskeletal: Normal range of motion and neck supple. Pulmonary:      Effort: Pulmonary effort is normal. No respiratory distress. Musculoskeletal:      Comments: Normal gait     Skin:     General: Skin is warm and dry. Neurological:      Mental Status: Alert and oriented to person, place, and time. Sensory: No sensory deficit. Psychiatric:         Behavior: Behavior normal.         Thought Content: Thought content normal.    No tenderness palpation over fracture patient reports some cramping of the ring and small finger but not really typical symptoms of a malunion    Provider Attestation:  Bob Street, personally performed the services described in this documentation. All medical record entries made by the scribe were at my direction and in my presence. I have reviewed the chart and discharge instructions and agree that the records reflect my personal performance and is accurate and complete. Wanda Neff MD 8/24/21     Scribe Attestation:  By signing my name below, Phyllis Vela, attest that this documentation has been prepared under the direction and in the presence of Dr. Polo Oakes. Electronically signed: Tigist Naik, 8/24/21     Please note that this chart was generated using voice recognition Dragon dictation software. Although every effort was made to ensure the accuracy of this automated transcription, some errors in transcription may have occurred.

## 2021-08-31 DIAGNOSIS — R25.1 TREMOR: ICD-10-CM

## 2021-08-31 NOTE — TELEPHONE ENCOUNTER
Pt woke up shaking and lightheaded, some numbness. Thinks it is withdrawal.  Uses [facility] on [address].

## 2021-09-01 RX ORDER — CLONAZEPAM 0.25 MG/1
0.25 TABLET, ORALLY DISINTEGRATING ORAL EVERY OTHER DAY
Qty: 10 TABLET | Refills: 0 | Status: SHIPPED | OUTPATIENT
Start: 2021-09-01 | End: 2021-09-24

## 2021-09-03 ENCOUNTER — CARE COORDINATION (OUTPATIENT)
Dept: CARE COORDINATION | Age: 76
End: 2021-09-03

## 2021-09-03 RX ORDER — CITALOPRAM 40 MG/1
40 TABLET ORAL DAILY
Qty: 30 TABLET | Refills: 5 | Status: SHIPPED | OUTPATIENT
Start: 2021-09-03 | End: 2022-03-01

## 2021-09-03 NOTE — CARE COORDINATION
Left VM message asking patient to call me back at [phone] for care management, update on her living situation. This is the fourth attempt to contact, she has not returned calls. Per last conversation with Jaimie  she is now living in new apartment with  and will again have home health visits, she is in regular contact with her every week. Discharged from care management.     Future Appointments   Date Time Provider Junaid Christopher   10/26/2021  4:20 PM MD TOMY Duarte

## 2021-09-04 NOTE — CARE COORDINATION
Patient returned call. She is doing well since in new apartment and home health nurse saw her today and set up all her medications. She has not had any recent falls, sees surgeon for wrist in a few weeks. Has to climb a set of stairs at apartment complex, they installed another handrail for her there. Using her cane too. She wants to eventually move to a ground floor apartment when one is available. They had to get all new furniture due to the bedbugs but everything is good now. Is in contact with her children frequently as well as her Jaimie . Has no concerns right now and feels she doesn't need any further assistance.

## 2021-09-15 ENCOUNTER — TELEPHONE (OUTPATIENT)
Dept: PRIMARY CARE CLINIC | Age: 76
End: 2021-09-15

## 2021-09-15 DIAGNOSIS — E03.9 HYPOTHYROIDISM, UNSPECIFIED TYPE: ICD-10-CM

## 2021-09-15 NOTE — TELEPHONE ENCOUNTER
Rufina Jerome called in to update you on patients home visit. Patients BP elevated at 146/96 - Rufina Jerome states this is a one time occurrence - previous BP have been normal.    Patient is doing well - not complaining of any pain - states the meloxicam is really helping. Rufina Jerome is still assisting with medications states Virgen Tatum is not able to do this on her own. Rufina Jerome would like to recert. Home care for 9 more weeks. OK to continue?     Please update Sharon at [phone]

## 2021-09-16 ENCOUNTER — TELEPHONE (OUTPATIENT)
Dept: PRIMARY CARE CLINIC | Age: 76
End: 2021-09-16

## 2021-09-16 RX ORDER — PANTOPRAZOLE SODIUM 40 MG/1
TABLET, DELAYED RELEASE ORAL
Qty: 90 TABLET | Refills: 0 | Status: SHIPPED | OUTPATIENT
Start: 2021-09-16 | End: 2022-01-07 | Stop reason: SDUPTHER

## 2021-09-16 RX ORDER — LEVOTHYROXINE SODIUM 0.05 MG/1
TABLET ORAL
Qty: 77 TABLET | Refills: 3 | Status: SHIPPED | OUTPATIENT
Start: 2021-09-16 | End: 2022-08-11 | Stop reason: SDUPTHER

## 2021-09-16 NOTE — TELEPHONE ENCOUNTER
This patient was supposed to have a PA done back in April for Prolia. Her Summary of Benefits is in her chart, but I cannot find if the MA did her PA because the patient was not scheduled for Prolia. Can you please check into this for me? She has had fractures since April and want to make sure the patient is receiving her Prolia if she still needs or wants it.

## 2021-09-16 NOTE — TELEPHONE ENCOUNTER
I faxed over the PA, I tried getting ahold of the pt, but I was unable to. I will try again tomorrow morning.

## 2021-09-20 NOTE — TELEPHONE ENCOUNTER
Home health notified - Barak Browne - who answered the phone stated she could not provide information on where patient is living.

## 2021-10-26 ENCOUNTER — OFFICE VISIT (OUTPATIENT)
Dept: ORTHOPEDIC SURGERY | Age: 76
End: 2021-10-26
Payer: COMMERCIAL

## 2021-10-26 DIAGNOSIS — G56.22 CUBITAL TUNNEL SYNDROME, LEFT: ICD-10-CM

## 2021-10-26 DIAGNOSIS — S62.317A DISPLACED FRACTURE OF BASE OF FIFTH METACARPAL BONE, LEFT HAND, INITIAL ENCOUNTER FOR CLOSED FRACTURE: Primary | ICD-10-CM

## 2021-10-26 PROCEDURE — 99213 OFFICE O/P EST LOW 20 MIN: CPT | Performed by: ORTHOPAEDIC SURGERY

## 2021-10-26 NOTE — PROGRESS NOTES
Patient ID: Geraldo Andre is a 76 y.o. female    Chief Compliant:  No chief complaint on file. Diagnostic imaging:        Assessment and Plan:  1. Displaced fracture of base of fifth metacarpal bone, left hand, initial encounter for closed fracture    2. Cubital tunnel syndrome, left        Patient doing well post 5th metacarpal base fracture    Patient with new complaint of dysesthesia to the ring and small finger    Clinical concern exam consistent with cubital tunnel syndrome    EMGs      Follow up      HPI:  This is a 76 y.o. female who presents to the clinic today for post ORIF on 06/25/2021. Review of Systems   All other systems reviewed and are negative. Past History:    Current Outpatient Medications:     clonazePAM (KLONOPIN) 0.25 MG disintegrating tablet, Take 1 tablet by mouth daily as needed (tremors) for up to 30 days. , Disp: 30 tablet, Rfl: 0    pantoprazole (PROTONIX) 40 MG tablet, TAKE ONE TABLET BY MOUTH DAILY, Disp: 90 tablet, Rfl: 0    levothyroxine (SYNTHROID) 50 MCG tablet, TAKE ONE TABLET BY MOUTH DAILY, MONDAY THROUGH SATURDAY. SKIP SUNDAY, Disp: 77 tablet, Rfl: 3    citalopram (CELEXA) 40 MG tablet, Take 1 tablet by mouth daily, Disp: 30 tablet, Rfl: 5    amoxicillin (AMOXIL) 250 MG capsule, TAKE ONE CAPSULE BY MOUTH THREE TIMES A DAY FOR 7 DAYS, Disp: 21 capsule, Rfl: 0    triamcinolone (KENALOG) 0.1 % cream, APPLY TOPICALLY TWO TIMES A DAY, Disp: 45 g, Rfl: 0    albuterol sulfate  (90 Base) MCG/ACT inhaler, INHALE ONE TO TWO PUFFS BY MOUTH EVERY 4 HOURS AS NEEDED FOR WHEEZING OR SHORTNESS OF BREATH.  SPACE OUT TO EVERY 6 HOURS AS SYMPTOMS IMPROVE, Disp: 18 g, Rfl: 2    meloxicam (MOBIC) 15 MG tablet, TAKE ONE TABLET BY MOUTH DAILY, Disp: 30 tablet, Rfl: 2    ondansetron (ZOFRAN) 4 MG tablet, Take every six hours as needed, Disp: 20 tablet, Rfl: 0    calcium carbonate (OSCAL) 500 MG TABS tablet, Take 500 mg by mouth daily, Disp: , Rfl:     vitamin B-12 (CYANOCOBALAMIN) 1000 MCG tablet, TAKE ONE TABLET BY MOUTH DAILY, Disp: 30 tablet, Rfl: 2    cycloSPORINE (RESTASIS) 0.05 % ophthalmic emulsion, Apply 1 drop to eye 2 times daily, Disp: , Rfl:     nystatin (MYCOSTATIN) [id number] UNIT/ML suspension, Take 5 mLs by mouth 4 times daily Indications: thrush Swoosh and swallow for thrush, Disp: 473 mL, Rfl: 0    Handicap Placard MISC, by Does not apply route Exp - 10/21/2025, Disp: 1 each, Rfl: 0    BREO ELLIPTA 200-25 MCG/INH AEPB inhaler, INHALE ONE DOSE BY MOUTH DAILY, Disp: 60 each, Rfl: 2    STOOL SOFTENER 100 MG capsule, TAKE ONE CAPSULE BY MOUTH DAILY, Disp: 30 capsule, Rfl: 2    Misc. Devices (STEP N REST WALKER) MISC, 1 each by Does not apply route continuous Seated, wheeled walker, Disp: 1 each, Rfl: 0    nystatin (MYCOSTATIN) 253597 UNIT/GM cream, Apply topically 3 times daily. , Disp: 30 g, Rfl: 5    nystatin (MYCOSTATIN) 635904 UNIT/GM powder, Apply 3 times daily. , Disp: 60 g, Rfl: 5    ibuprofen (ADVIL;MOTRIN) 200 MG tablet, Take 400 mg by mouth every 6 hours as needed for Pain, Disp: , Rfl:     guaiFENesin (MUCINEX) 600 MG extended release tablet, Take 1 tablet by mouth 2 times daily, Disp: , Rfl:     denosumab (PROLIA) 60 MG/ML SOSY SC injection, Inject 60 mg into the skin every 6 months Indications: next due Jan 2020, Disp: , Rfl:     polyvinyl alcohol-povidone (CLEAR EYES NATURAL TEARS) 5-6 MG/ML SOLN opthalmic solution, Place 1 drop into both eyes as needed for Dry Eyes , Disp: , Rfl:     Menthol (BIOFREEZE) 10 % AERO, Apply topically daily as needed Indications: applies to back, shoulders and knees , Disp: , Rfl:     folic acid (FOLVITE) 1 MG tablet, Take 1 tablet by mouth daily, Disp: 30 tablet, Rfl: 3    Vitamin D (CHOLECALCIFEROL) 1000 UNITS CAPS capsule, Take 1,000 Units by mouth daily, Disp: , Rfl:   Allergies   Allergen Reactions    Keflex [Cephalexin]      Diarrhea     Other      Seasonal allergies.      Bactrim [Sulfamethoxazole-Trimethoprim] Diarrhea and Nausea And Vomiting    Motrin [Ibuprofen] Diarrhea           Sulfa Antibiotics Diarrhea and Nausea And Vomiting    Wellbutrin [Bupropion] Other (See Comments)     Lightheaded, Dizziness     Social History     Socioeconomic History    Marital status:      Spouse name: Not on file    Number of children: Not on file    Years of education: Not on file    Highest education level: Not on file   Occupational History    Not on file   Tobacco Use    Smoking status: Current Every Day Smoker     Packs/day: 0.25     Years: 65.00     Pack years: 16.25     Types: Cigarettes    Smokeless tobacco: Never Used   Vaping Use    Vaping Use: Never used   Substance and Sexual Activity    Alcohol use: Yes     Alcohol/week: 7.0 standard drinks     Types: 7 Shots of liquor per week     Comment: baileys with kaluah (1/2 and 1/2) daily    Drug use: No    Sexual activity: Not Currently     Partners: Male   Other Topics Concern    Not on file   Social History Narrative    Not on file     Social Determinants of Health     Financial Resource Strain: Low Risk     Difficulty of Paying Living Expenses: Not very hard   Food Insecurity: No Food Insecurity    Worried About Running Out of Food in the Last Year: Never true    Radha of Food in the Last Year: Never true   Transportation Needs: No Transportation Needs    Lack of Transportation (Medical): No    Lack of Transportation (Non-Medical):  No   Physical Activity: Insufficiently Active    Days of Exercise per Week: 3 days    Minutes of Exercise per Session: 10 min   Stress:     Feeling of Stress :    Social Connections:     Frequency of Communication with Friends and Family:     Frequency of Social Gatherings with Friends and Family:     Attends Uatsdin Services:     Active Member of Clubs or Organizations:     Attends Club or Organization Meetings:     Marital Status:    Intimate Partner Violence:     Fear of Current or Ex-Partner:     Emotionally Abused:     Physically Abused:     Sexually Abused:      Past Medical History:   Diagnosis Date    Alcoholism ([facility] [address] [address] 10/20/2016    Anxiety     Cataract     Chronic pain     COPD (chronic obstructive pulmonary disease) ([facility]     Depression     Hematochezia 4/7/2017    Hypothyroid     Osteoporosis     Peptic ulcer of duodenum     chronic    Schatzki's ring     Sessile rectal polyp 04/10/2017    multi tiny    Tobacco abuse     Tubular adenoma of colon 04/10/2017    x2     Past Surgical History:   Procedure Laterality Date    CARPAL TUNNEL RELEASE Right     CATARACT REMOVAL      CHOLECYSTECTOMY, LAPAROSCOPIC N/A 5/10/2021    CHOLECYSTECTOMY LAPAROSCOPIC ROBOTIC XI performed by Mary Gabriel MD at [address] [place] [address]  04/10/2017    multi tiny sessile polyps; poor prep; tubular adenoma x 2    FINGER FRACTURE SURGERY Left 06/25/2021    5TH METACARPAL OPEN REDUCTION INTERNAL FIXATION WITH SYNTHES     FINGER SURGERY Left 6/25/2021    LEFT 5TH METACARPAL OPEN REDUCTION INTERNAL FIXATION WITH SYNTHES performed by Melanie Jackson MD at [place] [address]  10/27/2016    kyphoplasty L1 and L5    MS COLSC FLX W/RMVL OF TUMOR POLYP LESION SNARE TQ N/A 4/10/2017    COLONOSCOPY POLYPECTOMY SNARE x2 with photos performed by Elizabeth Solano MD at [address] [place] [address] EGD TRANSORAL BIOPSY SINGLE/MULTIPLE N/A 4/10/2017    EGD BIOPSY x2 with photos performed by Elizabeth Solano MD at [address] [place] [address] ARTHROSCOPY Right     x 2    TUBAL LIGATION      UPPER GASTROINTESTINAL ENDOSCOPY  04/10/2017    schatzki's ring; chronic peptic duodenitis     Family History   Problem Relation Age of Onset    Heart Disease Mother     Asthma Mother     Heart Disease Father         Physical Exam:  Vitals signs and nursing note reviewed. Constitutional:       Appearance: well-developed. HENT:      Head: Normocephalic and atraumatic.

## 2021-11-01 ENCOUNTER — OFFICE VISIT (OUTPATIENT)
Dept: PRIMARY CARE CLINIC | Age: 76
End: 2021-11-01
Payer: COMMERCIAL

## 2021-11-01 VITALS
BODY MASS INDEX: 24.28 KG/M2 | HEIGHT: 61 IN | OXYGEN SATURATION: 99 % | WEIGHT: 128.6 LBS | HEART RATE: 72 BPM | DIASTOLIC BLOOD PRESSURE: 74 MMHG | SYSTOLIC BLOOD PRESSURE: 124 MMHG

## 2021-11-01 DIAGNOSIS — E03.9 HYPOTHYROIDISM, UNSPECIFIED TYPE: ICD-10-CM

## 2021-11-01 DIAGNOSIS — Z23 NEED FOR VACCINATION: ICD-10-CM

## 2021-11-01 DIAGNOSIS — G89.29 OTHER CHRONIC PAIN: Primary | ICD-10-CM

## 2021-11-01 DIAGNOSIS — Z00.00 ROUTINE GENERAL MEDICAL EXAMINATION AT A HEALTH CARE FACILITY: ICD-10-CM

## 2021-11-01 LAB
ALCOHOL URINE: NORMAL
AMPHETAMINE SCREEN, URINE: NORMAL
BARBITURATE SCREEN, URINE: NORMAL
BENZODIAZEPINE SCREEN, URINE: NORMAL
BUPRENORPHINE URINE: NORMAL
COCAINE METABOLITE SCREEN URINE: NORMAL
FENTANYL SCREEN, URINE: NORMAL
GABAPENTIN SCREEN, URINE: NORMAL
MDMA URINE: NORMAL
METHADONE SCREEN, URINE: NORMAL
METHAMPHETAMINE, URINE: NORMAL
OPIATE SCREEN URINE: NORMAL
OXYCODONE SCREEN URINE: NORMAL
PHENCYCLIDINE SCREEN URINE: NORMAL
PROPOXYPHENE SCREEN, URINE: NORMAL
SYNTHETIC CANNABINOIDS(K2) SCREEN, URINE: NORMAL
THC SCREEN, URINE: NORMAL
THYROXINE, FREE: 1.57 NG/DL (ref 0.93–1.7)
TRAMADOL SCREEN URINE: NORMAL
TRICYCLIC ANTIDEPRESSANTS, UR: NORMAL
TSH SERPL DL<=0.05 MIU/L-ACNC: 1.84 MIU/L (ref 0.3–5)

## 2021-11-01 PROCEDURE — G0439 PPPS, SUBSEQ VISIT: HCPCS | Performed by: FAMILY MEDICINE

## 2021-11-01 PROCEDURE — G0008 ADMIN INFLUENZA VIRUS VAC: HCPCS | Performed by: FAMILY MEDICINE

## 2021-11-01 PROCEDURE — 90694 VACC AIIV4 NO PRSRV 0.5ML IM: CPT | Performed by: FAMILY MEDICINE

## 2021-11-01 PROCEDURE — 80305 DRUG TEST PRSMV DIR OPT OBS: CPT | Performed by: FAMILY MEDICINE

## 2021-11-01 RX ORDER — BIOTIN 10 MG
10 TABLET ORAL DAILY
COMMUNITY

## 2021-11-01 RX ORDER — MULTIVIT WITH MINERALS/LUTEIN
1000 TABLET ORAL DAILY
COMMUNITY
End: 2022-04-29

## 2021-11-01 ASSESSMENT — PATIENT HEALTH QUESTIONNAIRE - PHQ9
2. FEELING DOWN, DEPRESSED OR HOPELESS: 1
SUM OF ALL RESPONSES TO PHQ QUESTIONS 1-9: 2
SUM OF ALL RESPONSES TO PHQ9 QUESTIONS 1 & 2: 2
1. LITTLE INTEREST OR PLEASURE IN DOING THINGS: 1
SUM OF ALL RESPONSES TO PHQ QUESTIONS 1-9: 2
SUM OF ALL RESPONSES TO PHQ QUESTIONS 1-9: 2

## 2021-11-01 NOTE — PROGRESS NOTES
Medicare Annual Wellness Visit  Name: Nova Rosario Date: 2021   MRN: [id number] Sex: Female   Age: 76 y.o. Ethnicity: Non- / Non    : 1945 Race: White (non-)      Kylah Monahan is here for Medicare AWV, Medication Check, and Flu Vaccine    Screenings for behavioral, psychosocial and functional/safety risks, and cognitive dysfunction are all negative except as indicated below. These results, as well as other patient data from the [address] [facility] [address] form, are documented in Flowsheets linked to this Encounter. Allergies   Allergen Reactions    Keflex [Cephalexin]      Diarrhea     Other      Seasonal allergies.  Bactrim [Sulfamethoxazole-Trimethoprim] Diarrhea and Nausea And Vomiting    Motrin [Ibuprofen] Diarrhea           Sulfa Antibiotics Diarrhea and Nausea And Vomiting    Wellbutrin [Bupropion] Other (See Comments)     Lightheaded, Dizziness         Prior to Visit Medications    Medication Sig Taking? Authorizing Provider   Biotin 10 MG tablet Take 10 mg by mouth daily Yes Historical Provider, MD   vitamin E 1000 units capsule Take 1,000 Units by mouth daily Yes Historical Provider, MD   clonazePAM (KLONOPIN) 0.25 MG disintegrating tablet Take 1 tablet by mouth daily as needed (tremors) for up to 30 days. Yes Damir Diego MD   pantoprazole (PROTONIX) 40 MG tablet TAKE ONE TABLET BY MOUTH DAILY Yes Damir Diego MD   levothyroxine (SYNTHROID) 50 MCG tablet TAKE ONE TABLET BY MOUTH DAILY, C/Casia 10. Bailee Guerra MD   citalopram (CELEXA) 40 MG tablet Take 1 tablet by mouth daily Yes Damir Diego MD   albuterol sulfate  (90 Base) MCG/ACT inhaler INHALE ONE TO TWO PUFFS BY MOUTH EVERY 4 HOURS AS NEEDED FOR WHEEZING OR SHORTNESS OF BREATH.  SPACE OUT TO EVERY 6 HOURS AS SYMPTOMS IMPROVE Yes Damir Diego MD   meloxicam (MOBIC) 15 MG tablet TAKE ONE TABLET BY MOUTH DAILY Yes Darrius Whitney PA-C calcium carbonate (OSCAL) 500 MG TABS tablet Take 500 mg by mouth daily Yes Historical Provider, MD   vitamin B-12 (CYANOCOBALAMIN) 1000 MCG tablet TAKE ONE TABLET BY MOUTH DAILY Yes Kasi Humphrey MD   cycloSPORINE (RESTASIS) 0.05 % ophthalmic emulsion Apply 1 drop to eye 2 times daily Yes Historical Provider, MD   Handicap Placard MISC by Does not apply route Exp - 10/21/2025 Yes Kasi Humphrey MD   BREO ELLIPTA 200-25 MCG/INH AEPB inhaler INHALE ONE DOSE BY MOUTH DAILY Yes Kasi Humphrey MD   STOOL SOFTENER 100 MG capsule TAKE ONE CAPSULE BY MOUTH DAILY Yes Kasi Humphrey MD   Misc.  Devices (STEP N REST WALKER) MISC 1 each by Does not apply route continuous Seated, wheeled walker Yes Kasi Humphrey MD   ibuprofen (ADVIL;MOTRIN) 200 MG tablet Take 400 mg by mouth every 6 hours as needed for Pain Yes Historical Provider, MD   guaiFENesin (MUCINEX) 600 MG extended release tablet Take 1 tablet by mouth 2 times daily Yes Kasi Humphrey MD   denosumab (PROLIA) 60 MG/ML SOSY SC injection Inject 60 mg into the skin every 6 months Indications: next due Jan 2020 Yes Historical Provider, MD   polyvinyl alcohol-povidone (CLEAR EYES NATURAL TEARS) 5-6 MG/ML SOLN opthalmic solution Place 1 drop into both eyes as needed for Dry Eyes  Yes Historical Provider, MD   folic acid (FOLVITE) 1 MG tablet Take 1 tablet by mouth daily Yes Guerline Thomas MD   Vitamin D (CHOLECALCIFEROL) 1000 UNITS CAPS capsule Take 1,000 Units by mouth daily Yes Historical Provider, MD         Past Medical History:   Diagnosis Date    Alcoholism ([facility] [address] 10/20/2016    Anxiety     Cataract     Chronic pain     COPD (chronic obstructive pulmonary disease) ([facility] [address]     Depression     Hematochezia 4/7/2017    Hypothyroid     Osteoporosis     Peptic ulcer of duodenum     chronic    Schatzki's ring     Sessile rectal polyp 04/10/2017    multi tiny    Tobacco abuse     Tubular adenoma of colon 04/10/2017    x2       Past Surgical History:   Procedure Laterality Date    CARPAL TUNNEL RELEASE Right     CATARACT REMOVAL      CHOLECYSTECTOMY, LAPAROSCOPIC N/A 5/10/2021    CHOLECYSTECTOMY LAPAROSCOPIC ROBOTIC XI performed by Gregory Lovell MD at [address] [facility]  04/10/2017    multi tiny sessile polyps; poor prep; tubular adenoma x 2    FINGER FRACTURE SURGERY Left 06/25/2021    5TH METACARPAL OPEN REDUCTION INTERNAL FIXATION WITH SYNTHES     FINGER SURGERY Left 6/25/2021    LEFT 5TH METACARPAL OPEN REDUCTION INTERNAL FIXATION WITH SYNTHES performed by Maris Reyes MD at [place] [address]  10/27/2016    kyphoplasty L1 and L5    CO COLSC FLX W/RMVL OF TUMOR POLYP LESION SNARE TQ N/A 4/10/2017    COLONOSCOPY POLYPECTOMY SNARE x2 with photos performed by Nettie Rico MD at [address] [facility] EGD TRANSORAL BIOPSY SINGLE/MULTIPLE N/A 4/10/2017    EGD BIOPSY x2 with photos performed by Nettie Rico MD at [address] [facility] ARTHROSCOPY Right     x 2    TUBAL LIGATION      UPPER GASTROINTESTINAL ENDOSCOPY  04/10/2017    schatzki's ring; chronic peptic duodenitis         Family History   Problem Relation Age of Onset    Heart Disease Mother     Asthma Mother     Heart Disease Father        CareTeam (Including outside providers/suppliers regularly involved in providing care):   Patient Care Team:  Damir Diego MD as PCP - General (Family Medicine)  Damir Diego MD as PCP - [facility]  Miller Billings MD as Consulting Physician (Gastroenterology)    Wt Readings from Last 3 Encounters:   11/01/21 128 lb 9.6 oz (58.3 kg)   08/24/21 131 lb (59.4 kg)   07/22/21 131 lb (59.4 kg)     Vitals:    11/01/21 1427   BP: 124/74   Pulse: 72   SpO2: 99%   Weight: 128 lb 9.6 oz (58.3 kg)   Height: 5' 1\" (1.549 m)     Body mass index is 24.3 kg/m². Based upon direct observation of the patient, evaluation of cognition reveals recent and remote memory intact.     Physical Exam  Vitals and nursing note reviewed. Constitutional:       General: She is not in acute distress. Appearance: She is well-developed. She is not ill-appearing. Comments: Thin elderly female   HENT:      Head: Normocephalic and atraumatic. Right Ear: External ear normal.      Left Ear: External ear normal.   Eyes:      General: No scleral icterus. Right eye: No discharge. Left eye: No discharge. Conjunctiva/sclera: Conjunctivae normal.   Neck:      Thyroid: No thyromegaly. Trachea: No tracheal deviation. Cardiovascular:      Rate and Rhythm: Normal rate and regular rhythm. Heart sounds: Normal heart sounds. Pulmonary:      Effort: Pulmonary effort is normal. No respiratory distress. Breath sounds: Normal breath sounds. No wheezing. Musculoskeletal:      Comments: Uses her walker to walk. Has forward posture   Lymphadenopathy:      Cervical: No cervical adenopathy. Skin:     General: Skin is warm. Findings: No rash. Neurological:      Mental Status: She is alert. Comments: Alert and answers questions appropriately   Psychiatric:         Mood and Affect: Mood normal.         Behavior: Behavior normal.       Has 9 steps to get into her apartment she lives in with spouse. Home nurse comes in to fill her medication and check her BP and pulse  No home health aides. Doesn't have any housekeeping help  Has hand  in the shower and railings up the stairs. Has stopped smoking x 3 weeks. Patient's complete Health Risk Assessment and screening values have been reviewed and are found in Flowsheets. The following problems were reviewed today and where indicated follow up appointments were made and/or referrals ordered. Positive Risk Factor Screenings with Interventions:     Fall Risk:  2 or more falls in past year?: (!) yes  Fall with injury in past year?: no  Fall Risk Interventions:    · uses a walker.         Substance History:  Social History     Tobacco History     Smoking Status  Current Every Day Smoker Smoking Frequency  0.25 packs/day for 65 years (16.25 pk yrs) Smoking Tobacco Type  Cigarettes    Smokeless Tobacco Use  Never Used          Alcohol History     Alcohol Use Status  Yes Drinks/Week  0 Glasses of wine, 0 Cans of beer, 7 Shots of liquor, 0 Standard drinks or equivalent per week Amount  7.0 standard drinks of alcohol/wk Comment  baileys with kaluah (1/2 and 1/2) daily          Drug Use     Drug Use Status  No          Sexual Activity     Sexually Active  Not Currently Partners  Male               Alcohol Screening:       A score of 8 or more is associated with harmful or hazardous drinking. A score of 13 or more in women, and 15 or more in men, is likely to indicate alcohol dependence. Substance Abuse Interventions:  · Tobacco abuse:  Patient states she quit smoking 3 weeks ago  · Alcohol misuse/dependence:  patient is not ready to change his/her alcohol consumption behavior at this time    General Health and ACP:  General  In general, how would you say your health is?: (!) Poor  In the past 7 days, have you experienced any of the following? New or Increased Pain, New or Increased Fatigue, Loneliness, Social Isolation, Stress or Anger?: (!) New or Increased Pain, New or Increased Fatigue, Loneliness, Social Isolation, Stress, Anger  Do you get the social and emotional support that you need?: (!) No  Do you have a Living Will?: (!) No  Advance Directives     Power of  Living Will ACP-Advance Directive ACP-Power of     Not on File [place] on 03/07/18 Filed [address] [facility] Risk Interventions:  · no close family, adult children not involved.      Health Habits/Nutrition:  Health Habits/Nutrition  Do you exercise for at least 20 minutes 2-3 times per week?: (!) No  Have you lost any weight without trying in the past 3 months?: No  Do you eat only one meal per day?: No  Have you seen the dentist within the past year?: Yes  Body mass index: 24.3  Health Habits/Nutrition Interventions:  · she can't exercise, uses walker    Hearing/Vision:  No exam data present  Hearing/Vision  Do you or your family notice any trouble with your hearing that hasn't been managed with hearing aids?: No  Do you have difficulty driving, watching TV, or doing any of your daily activities because of your eyesight?: (!) Yes  Have you had an eye exam within the past year?: Yes  Hearing/Vision Interventions:  · Vision concerns:  patient encouraged to make appointment with his/her eye specialist     Feels lightheaded x 3 weeks. ADL:  ADLs  In the past 7 days, did you need help from others to perform any of the following everyday activities? Eating, dressing, grooming, bathing, toileting, or walking/balance?: None  In the past 7 days, did you need help from others to take care of any of the following? Laundry, housekeeping, banking/finances, shopping, telephone use, food preparation, transportation, or taking medications?: (!) Transportation  ADL Interventions:  · she can't drive. spouse is driving even though he's not supposed to.      Personalized Preventive Plan   Current Health Maintenance Status  Immunization History   Administered Date(s) Administered    COVID-19, Klever Bran, Primary or Immunocompromised, PF, 100mcg/0.5mL 02/04/2021, 03/04/2021    DT (pediatric) 04/02/1998    Influenza Virus Vaccine 10/02/2008, 09/20/2013, 10/30/2014, 11/02/2015    Influenza, High Dose (Fluzone 65 yrs and older) 09/20/2013, 10/30/2014, 11/02/2015, 09/25/2017, 09/25/2018    Influenza, Quadv, IM, PF (6 mo and older Fluzone, Flulaval, Fluarix, and 3 yrs and older Afluria) 10/20/2016    Influenza, Quadv, adjuvanted, 65 yrs +, IM, PF (Fluad) 09/24/2020    Influenza, Triv, inactivated, subunit, adjuvanted, IM (Fluad 65 yrs and older) 10/11/2019    Pneumococcal Conjugate 13-valent ([id number]) 04/23/2015    Pneumococcal Polysaccharide ([id number]) 10/20/2016    Tdap (Boostrix, Adacel) 09/19/2017, 06/19/2021        Health Maintenance   Topic Date Due    Hepatitis C screen  Never done    Shingles Vaccine (1 of 2) Never done    Colon cancer screen colonoscopy  04/10/2019    TSH testing  07/05/2020    Annual Wellness Visit (AWV)  08/01/2021    Flu vaccine (1) 09/01/2021    Lipid screen  07/31/2025    DTaP/Tdap/Td vaccine (4 - Td or Tdap) 06/19/2031    DEXA (modify frequency per FRAX score)  Completed    Pneumococcal 65+ years Vaccine  Completed    COVID-19 Vaccine  Completed    Hepatitis A vaccine  Aged Out    Hepatitis B vaccine  Aged Out    Hib vaccine  Aged Out    Meningococcal (ACWY) vaccine  Aged Out     Recommendations for [employer] Due: see orders and patient instructions/AVS.  . Recommended screening schedule for the next 5-10 years is provided to the patient in written form: see Patient Vahid Rock was seen today for medicare awv, medication check and flu vaccine. Diagnoses and all orders for this visit:  Routine general medical examination at a health care facility      Other chronic pain  -     POCT Rapid Drug Screen    Hypothyroidism, unspecified type  -     T4, Free; Future  -     TSH without Reflex;  Future    Need for vaccination  -     INFLUENZA, QUADV, ADJUVANTED, 65 YRS =, IM, PF, PREFILL SYR, 0.5ML (FLUAD)

## 2021-11-23 DIAGNOSIS — M19.90 ARTHRITIS: ICD-10-CM

## 2021-11-23 DIAGNOSIS — S32.000S COMPRESSION FRACTURE OF LUMBAR VERTEBRA, UNSPECIFIED LUMBAR VERTEBRAL LEVEL, SEQUELA: ICD-10-CM

## 2021-11-23 DIAGNOSIS — G89.29 OTHER CHRONIC PAIN: ICD-10-CM

## 2021-11-23 RX ORDER — MELOXICAM 15 MG/1
TABLET ORAL
Qty: 30 TABLET | Refills: 2 | Status: SHIPPED | OUTPATIENT
Start: 2021-11-23 | End: 2022-03-02 | Stop reason: SDUPTHER

## 2021-11-24 ENCOUNTER — TELEPHONE (OUTPATIENT)
Dept: PRIMARY CARE CLINIC | Age: 76
End: 2021-11-24

## 2021-11-24 NOTE — TELEPHONE ENCOUNTER
Shyam Mayfield is calling from [facility] and states the patient is taking more than the prescribed dose of her meloxicam- Shyam Mayfield could not tell me how much per day, she just stated it was more daily than what she is supposed to.      Shyam Mayfield also states she walked into the patients house last week and the patient was drinking alcohol at [address] [facility] #[phone]

## 2021-11-24 NOTE — TELEPHONE ENCOUNTER
Can the home nurse organize her meds ? Also let the pharmacy know: is there a way to organize her meds or is she already on a pill pack.

## 2021-12-02 NOTE — TELEPHONE ENCOUNTER
Kristi Wesley said that they set up her meds weekly for her. No way she can do it. Pt tried once and it was a mess.

## 2022-01-07 RX ORDER — PANTOPRAZOLE SODIUM 40 MG/1
40 TABLET, DELAYED RELEASE ORAL DAILY
Qty: 90 TABLET | Refills: 0 | Status: SHIPPED | OUTPATIENT
Start: 2022-01-07 | End: 2022-04-04

## 2022-01-18 ENCOUNTER — HOSPITAL ENCOUNTER (OUTPATIENT)
Dept: NEUROLOGY | Age: 77
Discharge: HOME | End: 2022-01-18
Payer: MEDICARE

## 2022-01-18 DIAGNOSIS — S62.317A DISPLACED FRACTURE OF BASE OF FIFTH METACARPAL BONE, LEFT HAND, INITIAL ENCOUNTER FOR CLOSED FRACTURE: ICD-10-CM

## 2022-01-18 DIAGNOSIS — G56.22 CUBITAL TUNNEL SYNDROME, LEFT: ICD-10-CM

## 2022-01-18 PROCEDURE — 95911 NRV CNDJ TEST 9-10 STUDIES: CPT | Performed by: PHYSICAL MEDICINE & REHABILITATION

## 2022-01-18 PROCEDURE — 95886 MUSC TEST DONE W/N TEST COMP: CPT | Performed by: PHYSICAL MEDICINE & REHABILITATION

## 2022-02-15 ENCOUNTER — TELEPHONE (OUTPATIENT)
Dept: PRIMARY CARE CLINIC | Age: 77
End: 2022-02-15

## 2022-02-15 NOTE — TELEPHONE ENCOUNTER
Sharon from [place] calling to see if pt had an appt scheduled and she does not. Will have Godwin (Nurse) see pt on Fri and call for an appt with Dr. Wendy Scott re P.T. for weakness. If agrees pt does need P.T.  Fax order to them @ [phone]

## 2022-02-18 ENCOUNTER — OFFICE VISIT (OUTPATIENT)
Dept: PRIMARY CARE CLINIC | Age: 77
End: 2022-02-18
Payer: MEDICARE

## 2022-02-18 ENCOUNTER — NURSE TRIAGE (OUTPATIENT)
Dept: OTHER | Facility: CLINIC | Age: 77
End: 2022-02-18

## 2022-02-18 VITALS
WEIGHT: 126.4 LBS | SYSTOLIC BLOOD PRESSURE: 124 MMHG | TEMPERATURE: 97.2 F | OXYGEN SATURATION: 98 % | DIASTOLIC BLOOD PRESSURE: 76 MMHG | HEIGHT: 61 IN | HEART RATE: 73 BPM | BODY MASS INDEX: 23.86 KG/M2

## 2022-02-18 DIAGNOSIS — M79.674 PAIN IN TOES OF BOTH FEET: ICD-10-CM

## 2022-02-18 DIAGNOSIS — J06.9 UPPER RESPIRATORY TRACT INFECTION, UNSPECIFIED TYPE: Primary | ICD-10-CM

## 2022-02-18 DIAGNOSIS — M79.675 PAIN IN TOES OF BOTH FEET: ICD-10-CM

## 2022-02-18 PROCEDURE — 99213 OFFICE O/P EST LOW 20 MIN: CPT | Performed by: FAMILY MEDICINE

## 2022-02-18 RX ORDER — AMOXICILLIN 500 MG/1
500 CAPSULE ORAL 3 TIMES DAILY
Qty: 30 CAPSULE | Refills: 0 | Status: SHIPPED | OUTPATIENT
Start: 2022-02-18 | End: 2022-02-28

## 2022-02-18 RX ORDER — GUAIFENESIN 600 MG/1
600 TABLET, EXTENDED RELEASE ORAL 2 TIMES DAILY
Qty: 60 TABLET | Refills: 0 | Status: SHIPPED | OUTPATIENT
Start: 2022-02-18 | End: 2022-04-29 | Stop reason: ALTCHOICE

## 2022-02-18 SDOH — ECONOMIC STABILITY: FOOD INSECURITY: WITHIN THE PAST 12 MONTHS, THE FOOD YOU BOUGHT JUST DIDN'T LAST AND YOU DIDN'T HAVE MONEY TO GET MORE.: NEVER TRUE

## 2022-02-18 SDOH — ECONOMIC STABILITY: FOOD INSECURITY: WITHIN THE PAST 12 MONTHS, YOU WORRIED THAT YOUR FOOD WOULD RUN OUT BEFORE YOU GOT MONEY TO BUY MORE.: NEVER TRUE

## 2022-02-18 ASSESSMENT — SOCIAL DETERMINANTS OF HEALTH (SDOH): HOW HARD IS IT FOR YOU TO PAY FOR THE VERY BASICS LIKE FOOD, HOUSING, MEDICAL CARE, AND HEATING?: NOT HARD AT ALL

## 2022-02-18 ASSESSMENT — ENCOUNTER SYMPTOMS: COUGH: 1

## 2022-02-18 NOTE — PROGRESS NOTES
Pranay Horner is a 68 y.o. femalewho presents today for her medical conditions/complaints as noted below. Chief Complaint   Patient presents with    Pharyngitis     Pt here today with sore throat, increased weakness, fatigue, cough and a runny nose. x1.5 weeks         HPI:     HPI  No fever, + Chills  Has been taking benadryl. Doesn't have any mucinex. Cough, runny nose + ST, PND  Feels weak and dizzy  Ill x 10 days     Stopped smoking a few months ago. Uses vap cigarettes. breo irritated her mouth    Current Outpatient Medications   Medication Sig Dispense Refill    guaiFENesin (MUCINEX) 600 MG extended release tablet Take 1 tablet by mouth 2 times daily 60 tablet 0    amoxicillin (AMOXIL) 500 MG capsule Take 1 capsule by mouth 3 times daily for 10 days 30 capsule 0    pantoprazole (PROTONIX) 40 MG tablet Take 1 tablet by mouth daily 90 tablet 0    meloxicam (MOBIC) 15 MG tablet TAKE ONE TABLET BY MOUTH DAILY 30 tablet 2    Biotin 10 MG tablet Take 10 mg by mouth daily      vitamin E 1000 units capsule Take 1,000 Units by mouth daily      clonazePAM (KLONOPIN) 0.25 MG disintegrating tablet Take 1 tablet by mouth daily as needed (tremors) for up to 30 days. 30 tablet 0    levothyroxine (SYNTHROID) 50 MCG tablet TAKE ONE TABLET BY MOUTH DAILY, MONDAY THROUGH SATURDAY. SKIP SUNDAY 77 tablet 3    citalopram (CELEXA) 40 MG tablet Take 1 tablet by mouth daily 30 tablet 5    calcium carbonate (OSCAL) 500 MG TABS tablet Take 500 mg by mouth daily      vitamin B-12 (CYANOCOBALAMIN) 1000 MCG tablet TAKE ONE TABLET BY MOUTH DAILY 30 tablet 2    cycloSPORINE (RESTASIS) 0.05 % ophthalmic emulsion Apply 1 drop to eye 2 times daily      Handicap Placard MISC by Does not apply route Exp - 10/21/2025 1 each 0    Misc.  Devices (STEP N REST WALKER) MISC 1 each by Does not apply route continuous Seated, wheeled walker 1 each 0    ibuprofen (ADVIL;MOTRIN) 200 MG tablet Take 400 mg by mouth every 6 hours as needed for Pain      denosumab (PROLIA) 60 MG/ML SOSY SC injection Inject 60 mg into the skin every 6 months Indications: next due Jan 2020      polyvinyl alcohol-povidone (CLEAR EYES NATURAL TEARS) 5-6 MG/ML SOLN opthalmic solution Place 1 drop into both eyes as needed for Dry Eyes       folic acid (FOLVITE) 1 MG tablet Take 1 tablet by mouth daily 30 tablet 3    Vitamin D (CHOLECALCIFEROL) 1000 UNITS CAPS capsule Take 1,000 Units by mouth daily      albuterol sulfate  (90 Base) MCG/ACT inhaler INHALE ONE TO TWO PUFFS BY MOUTH EVERY 4 HOURS AS NEEDED FOR WHEEZING OR SHORTNESS OF BREATH. SPACE OUT TO EVERY 6 HOURS AS SYMPTOMS IMPROVE (Patient not taking: Reported on 2/18/2022) 18 g 2    STOOL SOFTENER 100 MG capsule TAKE ONE CAPSULE BY MOUTH DAILY 30 capsule 2     No current facility-administered medications for this visit. Allergies   Allergen Reactions    Keflex [Cephalexin]      Diarrhea     Other      Seasonal allergies.  Bactrim [Sulfamethoxazole-Trimethoprim] Diarrhea and Nausea And Vomiting    Motrin [Ibuprofen] Diarrhea           Sulfa Antibiotics Diarrhea and Nausea And Vomiting    Wellbutrin [Bupropion] Other (See Comments)     Lightheaded, Dizziness       Subjective:     Review of Systems   Constitutional: Positive for fatigue. Negative for fever. Respiratory: Positive for cough. Objective:     /76   Pulse 73   Temp 97.2 °F (36.2 °C) (Infrared)   Ht 5' 1\" (1.549 m)   Wt 126 lb 6.4 oz (57.3 kg)   SpO2 98%   BMI 23.88 kg/m²   Physical Exam  Vitals and nursing note reviewed. Constitutional:       General: She is not in acute distress. Appearance: She is well-developed. She is not ill-appearing. HENT:      Head: Normocephalic and atraumatic.       Right Ear: Tympanic membrane, ear canal and external ear normal.      Left Ear: Tympanic membrane, ear canal and external ear normal.      Mouth/Throat:      Pharynx: No oropharyngeal exudate or posterior oropharyngeal erythema. Eyes:      General: No scleral icterus. Right eye: No discharge. Left eye: No discharge. Conjunctiva/sclera: Conjunctivae normal.   Neck:      Thyroid: No thyromegaly. Trachea: No tracheal deviation. Cardiovascular:      Rate and Rhythm: Normal rate and regular rhythm. Heart sounds: Normal heart sounds. Pulmonary:      Effort: Pulmonary effort is normal. No respiratory distress. Breath sounds: No wheezing. Comments: Distant BS  Musculoskeletal:      Comments: Uses cane   Lymphadenopathy:      Cervical: No cervical adenopathy. Skin:     General: Skin is warm. Findings: No rash. Neurological:      Mental Status: She is alert and oriented to person, place, and time. Psychiatric:         Mood and Affect: Mood normal.         Behavior: Behavior normal.         Thought Content: Thought content normal.         Assessment:       Diagnosis Orders   1. Upper respiratory tract infection, unspecified type     2. Pain in toes of both feet  Yocasta Thomas DPM, Podiatry, [address] [place] [address]:      No follow-ups on file. Do NOT TAKE benadryl  Use amoxicillin and mucinex  If not better in 3 days call back. Orders Placed This Encounter   Katia Bahena DPM, Podiatry, [place]     Referral Priority:   Routine     Referral Type:   Eval and Treat     Referral Reason:   Specialty Services Required     Referred to Provider:   Yoni Diego DPM     Requested Specialty:   Podiatry     Number of Visits Requested:   1     Orders Placed This Encounter   Medications    guaiFENesin (MUCINEX) 600 MG extended release tablet     Sig: Take 1 tablet by mouth 2 times daily     Dispense:  60 tablet     Refill:  0    amoxicillin (AMOXIL) 500 MG capsule     Sig: Take 1 capsule by mouth 3 times daily for 10 days     Dispense:  30 capsule     Refill:  0      Reviewed medications and possibleside effects.        Electronically signed by Sarah Horne MD on 2/18/2022 at 3:14 PM

## 2022-02-18 NOTE — TELEPHONE ENCOUNTER
Received call from [facility] with [employer]. Subjective: Caller states \"Her weakness has been going on for about 1 week. She has a sore throat. She has trouble standing for more than 5 minutes and then she has to sit down. Her vital signs are within normal limits \" - triaged completed with Sarahi Arteaga and home health nurse Araceli. Current Symptoms: weakness, sore throat, light headed,     Onset: 1 week     Pain Severity: 6/10    Temperature: none     What has been tried: unsure     Recommended disposition: be seen in the next 24 hours. Care advice provided, patient verbalizes understanding; denies any other questions or concerns; instructed to call back for any new or worsening symptoms. Patient/Caller agrees with recommended disposition; writer provided warm transfer to [employer] at [facility] for appointment scheduling     Attention Provider: Thank you for allowing me to participate in the care of your patient. The patient was connected to triage in response to information provided to the ECC/PSC. Please do not respond through this encounter as the response is not directed to a shared pool.     Reminders:         Reason for Disposition   Sore throat   [1] MODERATE weakness (i.e., interferes with work, school, normal activities) AND [2] persists > 3 days    Protocols used: SORE THROAT-ADULT-OH, WEAKNESS (GENERALIZED) AND FATIGUE-ADULT-AH

## 2022-02-21 ENCOUNTER — TELEPHONE (OUTPATIENT)
Dept: PRIMARY CARE CLINIC | Age: 77
End: 2022-02-21

## 2022-02-21 NOTE — TELEPHONE ENCOUNTER
Pt was to call and let you know how she is feeling. Feeling a little better. Still light headed and is taking the abx.

## 2022-02-25 ENCOUNTER — OFFICE VISIT (OUTPATIENT)
Dept: PODIATRY | Age: 77
End: 2022-02-25
Payer: COMMERCIAL

## 2022-02-25 VITALS — BODY MASS INDEX: 23.79 KG/M2 | HEIGHT: 61 IN | WEIGHT: 126 LBS

## 2022-02-25 DIAGNOSIS — M79.675 PAIN OF TOES OF BOTH FEET: ICD-10-CM

## 2022-02-25 DIAGNOSIS — I73.9 PERIPHERAL VASCULAR DISEASE (HCC): ICD-10-CM

## 2022-02-25 DIAGNOSIS — B35.1 ONYCHOMYCOSIS OF TOENAIL: Primary | ICD-10-CM

## 2022-02-25 DIAGNOSIS — M79.674 PAIN OF TOES OF BOTH FEET: ICD-10-CM

## 2022-02-25 PROCEDURE — 11721 DEBRIDE NAIL 6 OR MORE: CPT | Performed by: PODIATRIST

## 2022-02-25 PROCEDURE — 99203 OFFICE O/P NEW LOW 30 MIN: CPT | Performed by: PODIATRIST

## 2022-02-25 ASSESSMENT — ENCOUNTER SYMPTOMS
NAUSEA: 0
SHORTNESS OF BREATH: 0
DIARRHEA: 0
BACK PAIN: 0
COLOR CHANGE: 0

## 2022-02-25 NOTE — PROGRESS NOTES
Samantha Bellamy is a 68 y.o. female who presents to the office today with chief complaint of thick, painful nails to both feet. Chief Complaint   Patient presents with    Nail Problem     b/l nail trim, last seen Beulah Burris MD 2/18/22    Diabetes   Symptoms began greater than 1 year(s) ago. Patient denies injury to the feet. Patient states that the nails are painful with shoe gear and ambulation. Pain is rated 6 out of 10 at it's worst and is described as intermittent. Treatments prior to today's visit include: None. Allergies   Allergen Reactions    Keflex [Cephalexin]      Diarrhea     Other      Seasonal allergies.  Bactrim [Sulfamethoxazole-Trimethoprim] Diarrhea and Nausea And Vomiting    Motrin [Ibuprofen] Diarrhea           Sulfa Antibiotics Diarrhea and Nausea And Vomiting    Wellbutrin [Bupropion] Other (See Comments)     Lightheaded, Dizziness       Past Medical History:   Diagnosis Date    Alcoholism ([facility] [address] 10/20/2016    Anxiety     Cataract     Chronic pain     COPD (chronic obstructive pulmonary disease) ([facility] [address]     Depression     Hematochezia 4/7/2017    Hypothyroid     Osteoporosis     Peptic ulcer of duodenum     chronic    Schatzki's ring     Sessile rectal polyp 04/10/2017    multi tiny    Tobacco abuse     Tubular adenoma of colon 04/10/2017    x2       Prior to Admission medications    Medication Sig Start Date End Date Taking?  Authorizing Provider   guaiFENesin (MUCINEX) 600 MG extended release tablet Take 1 tablet by mouth 2 times daily 2/18/22  Yes Jenny Boyer MD   amoxicillin (AMOXIL) 500 MG capsule Take 1 capsule by mouth 3 times daily for 10 days 2/18/22 2/28/22 Yes Jenny Boyer MD   pantoprazole (PROTONIX) 40 MG tablet Take 1 tablet by mouth daily 1/7/22  Yes Jenny Boyer MD   meloxicam (MOBIC) 15 MG tablet TAKE ONE TABLET BY MOUTH DAILY 11/23/21  Yes Jenny Boyer MD   Biotin 10 MG tablet Take 10 mg by mouth daily   Yes Historical Provider, MD   vitamin E 1000 units capsule Take 1,000 Units by mouth daily   Yes Historical Provider, MD   levothyroxine (SYNTHROID) 50 MCG tablet TAKE ONE TABLET BY MOUTH DAILY, MONDAY THROUGH SATURDAY. SKIP SUNDAY 9/16/21  Yes Yuliet Bass MD   citalopram (CELEXA) 40 MG tablet Take 1 tablet by mouth daily 9/3/21  Yes Yuliet Bass MD   albuterol sulfate  (90 Base) MCG/ACT inhaler INHALE ONE TO TWO PUFFS BY MOUTH EVERY 4 HOURS AS NEEDED FOR WHEEZING OR SHORTNESS OF BREATH. SPACE OUT TO EVERY 6 HOURS AS SYMPTOMS IMPROVE 7/14/21  Yes Yuliet Bass MD   calcium carbonate (OSCAL) 500 MG TABS tablet Take 500 mg by mouth daily   Yes Historical Provider, MD   vitamin B-12 (CYANOCOBALAMIN) 1000 MCG tablet TAKE ONE TABLET BY MOUTH DAILY 1/6/21  Yes Yuliet Bass MD   cycloSPORINE (RESTASIS) 0.05 % ophthalmic emulsion Apply 1 drop to eye 2 times daily 12/14/20 2/25/22 Yes Historical Provider, MD   Handicap Placard MISC by Does not apply route Exp - 10/21/2025 10/21/20  Yes Yuliet Bass MD   STOOL SOFTENER 100 MG capsule TAKE ONE CAPSULE BY MOUTH DAILY 9/10/20  Yes Yuliet Bass MD   Misc. Devices (STEP N REST WALKER) MISC 1 each by Does not apply route continuous Seated, wheeled walker 8/26/20  Yes Yulite Bass MD   denosumab (PROLIA) 60 MG/ML SOSY SC injection Inject 60 mg into the skin every 6 months Indications: next due Jan 2020   Yes Historical Provider, MD   polyvinyl alcohol-povidone (CLEAR EYES NATURAL TEARS) 5-6 MG/ML SOLN opthalmic solution Place 1 drop into both eyes as needed for Dry Eyes    Yes Historical Provider, MD   folic acid (FOLVITE) 1 MG tablet Take 1 tablet by mouth daily 9/9/19  Yes Margaux Robb MD   Vitamin D (CHOLECALCIFEROL) 1000 UNITS CAPS capsule Take 1,000 Units by mouth daily   Yes Historical Provider, MD   clonazePAM (KLONOPIN) 0.25 MG disintegrating tablet Take 1 tablet by mouth daily as needed (tremors) for up to 30 days.   Patient not taking: Reported on 2/25/2022 9/24/21 2/25/22  Ashley Bullard MD   ibuprofen (ADVIL;MOTRIN) 200 MG tablet Take 400 mg by mouth every 6 hours as needed for Pain  Patient not taking: Reported on 2/25/2022    Historical Provider, MD       Past Surgical History:   Procedure Laterality Date    CARPAL TUNNEL RELEASE Right     CATARACT REMOVAL      CHOLECYSTECTOMY, LAPAROSCOPIC N/A 5/10/2021    CHOLECYSTECTOMY LAPAROSCOPIC ROBOTIC XI performed by Susannah Duverney, MD at [address] [place] [address]  04/10/2017    multi tiny sessile polyps; poor prep; tubular adenoma x 2    FINGER FRACTURE SURGERY Left 06/25/2021    5TH METACARPAL OPEN REDUCTION INTERNAL FIXATION WITH SYNTHES     FINGER SURGERY Left 6/25/2021    LEFT 5TH METACARPAL OPEN REDUCTION INTERNAL FIXATION WITH SYNTHES performed by Frances Xavier MD at [place] [address]  10/27/2016    kyphoplasty L1 and L5    GA COLSC FLX W/RMVL OF TUMOR POLYP LESION SNARE TQ N/A 4/10/2017    COLONOSCOPY POLYPECTOMY SNARE x2 with photos performed by Elie Gauthier MD at [address] [place] [address] EGD TRANSORAL BIOPSY SINGLE/MULTIPLE N/A 4/10/2017    EGD BIOPSY x2 with photos performed by Elie Gauthier MD at [address] [place] [id number] ARTHROSCOPY Right     x 2    TUBAL LIGATION      UPPER GASTROINTESTINAL ENDOSCOPY  04/10/2017    schatzki's ring; chronic peptic duodenitis       Family History   Problem Relation Age of Onset    Heart Disease Mother     Asthma Mother     Heart Disease Father        Social History     Tobacco Use    Smoking status: Current Every Day Smoker     Packs/day: 0.25     Years: 65.00     Pack years: 16.25     Types: Cigarettes    Smokeless tobacco: Never Used   Substance Use Topics    Alcohol use:  Yes     Alcohol/week: 7.0 standard drinks     Types: 7 Shots of liquor per week     Comment: baileys with kaluah (1/2 and 1/2) daily       Review of Systems   Constitutional: Negative for activity change, appetite change, chills, diaphoresis, fatigue and fever. Respiratory: Negative for shortness of breath. Cardiovascular: Negative for leg swelling. Gastrointestinal: Negative for diarrhea and nausea. Endocrine: Negative for cold intolerance, heat intolerance and polyuria. Musculoskeletal: Positive for arthralgias. Negative for back pain, gait problem, joint swelling and myalgias. Skin: Negative for color change, pallor, rash and wound. Allergic/Immunologic: Negative for environmental allergies and food allergies. Neurological: Negative for dizziness, weakness, light-headedness and numbness. Hematological: Does not bruise/bleed easily. Psychiatric/Behavioral: Negative for behavioral problems, confusion and self-injury. The patient is not nervous/anxious. Vitals: There were no vitals filed for this visit. General: AAO x 3 in NAD. Integument: There are no rashes, ulcers, or breaks in the skin noted to the bilateral lower extremities. There is no induration, subcutaneous nodules, or tightening of the skin noted to the bilateral.     Toenails 1-5 of the right foot do present with thickness, elongation, discoloration, brittleness, subungual debris. Toenails 1-5 of the left foot do present with thickness, elongation, discoloration, brittleness, subungual debris. There is pain with palpation and debridement of toenails 1-5 of the right foot and 1-5 of the left foot. Interdigital maceration absent to web spaces 1-4, Bilateral.     There are no preulcerative lesions noted to the right foot. There are no preulcerative lesions noted to the left foot. The skin to the bilateral feet is not thin and shiny. The skin to the bilateral feet is  warm, supple, and dry. Vascular: DP pulse of the right foot is  palpable. DP pulse of the left foot is  palpable. PT pulse of the right foot is not palpable. PT pulse of the left foot is not palpable.      CFT is less than 3 secs to the digits of the right foot. CFT is less than 3 secs to the digits of the left foot. There is no edema noted to the bilateral foot or ankle. There is no hair growth noted to the digits of the bilateral feet. There are varicosities noted to the right foot/ankle. There are varicosities noted to the left foot/ankle. Erythema is absent to the bilateral feet. Neurological: Reflexes are present to the right plantar foot and to the Achilles tendon. Reflexes are present to the left plantar foot and to the Achilles tendon. Epicritic sensation is  intact to the right foot. Epicritic sensation is  intact to the left foot. Musculoskeletal:  Muscle strength is +5/5 to all four muscle groups of the right lower extremity and +5/5 to all four muscle groups of the left lower extremity. There are no areas of subluxation, dislocation, or laxity noted to either lower extremity. Range of motion to the right ankle is  free of pain or grinding. Range of motion to the left ankle is  free of pain or grinding. Range of motion to the right subtalar joint is  free of pain or grinding. Range of motion to the left subtalar joint is  free of pain or grinding. No abnormalities, asymmetries, or misalignments are seen between the extremities. Weightbearing evaluation does reveal rearfoot eversion, medial prominence of the talar head, loss of the medial longitudinal arch height, and too many toes sign bilaterally. The lesser digits of the right foot are contracted. The lesser digits of the left foot are contracted. There is no prominence noted to the first metatarsal head without abduction of the hallux of the right foot. There is no prominence noted to the first metatarsal head without abduction of the hallux of the left foot. Shoe examination was performed. Biomechanical Exam: normal bilaterally. Asessment: Patient is a 68 y.o. female with:    Diagnosis Orders   1. Onychomycosis of toenail  CT DEBRIDEMENT OF NAILS, 6 OR MORE   2. Pain of toes of both feet  CT DEBRIDEMENT OF NAILS, 6 OR MORE   3. Peripheral vascular disease (HCC)  CT DEBRIDEMENT OF NAILS, 6 OR MORE       Plan:  1. Clinical evaluation of the patient. 2. Toenails 1-5 of the right foot and 1-5 of the left foot were debrided in length and thickness using a nail nipper and a . 3. Contact office with any questions/problems/concerns. Return for Painful fungal nails.    2/25/2022      Luisa Molina DPM

## 2022-03-01 RX ORDER — CITALOPRAM 40 MG/1
TABLET ORAL
Qty: 30 TABLET | Refills: 5 | Status: SHIPPED | OUTPATIENT
Start: 2022-03-01 | End: 2022-09-07

## 2022-03-02 DIAGNOSIS — S32.000S COMPRESSION FRACTURE OF LUMBAR VERTEBRA, UNSPECIFIED LUMBAR VERTEBRAL LEVEL, SEQUELA: ICD-10-CM

## 2022-03-02 DIAGNOSIS — G89.29 OTHER CHRONIC PAIN: ICD-10-CM

## 2022-03-02 DIAGNOSIS — M19.90 ARTHRITIS: ICD-10-CM

## 2022-03-03 RX ORDER — MELOXICAM 15 MG/1
TABLET ORAL
Qty: 30 TABLET | Refills: 2 | Status: SHIPPED | OUTPATIENT
Start: 2022-03-03 | End: 2022-06-13

## 2022-03-08 ENCOUNTER — OFFICE VISIT (OUTPATIENT)
Dept: PRIMARY CARE CLINIC | Age: 77
End: 2022-03-08
Payer: MEDICARE

## 2022-03-08 VITALS
HEART RATE: 71 BPM | WEIGHT: 127.2 LBS | BODY MASS INDEX: 24.02 KG/M2 | TEMPERATURE: 97.6 F | HEIGHT: 61 IN | SYSTOLIC BLOOD PRESSURE: 124 MMHG | DIASTOLIC BLOOD PRESSURE: 76 MMHG | OXYGEN SATURATION: 98 %

## 2022-03-08 DIAGNOSIS — L08.9 INFECTED SEBACEOUS CYST: ICD-10-CM

## 2022-03-08 DIAGNOSIS — J06.9 UPPER RESPIRATORY TRACT INFECTION, UNSPECIFIED TYPE: Primary | ICD-10-CM

## 2022-03-08 DIAGNOSIS — L72.3 INFECTED SEBACEOUS CYST: ICD-10-CM

## 2022-03-08 PROCEDURE — 99212 OFFICE O/P EST SF 10 MIN: CPT | Performed by: FAMILY MEDICINE

## 2022-03-08 RX ORDER — CIPROFLOXACIN 500 MG/1
500 TABLET, FILM COATED ORAL 2 TIMES DAILY
Qty: 28 TABLET | Refills: 0 | Status: SHIPPED | OUTPATIENT
Start: 2022-03-08 | End: 2022-03-22

## 2022-03-08 RX ORDER — DOXYCYCLINE HYCLATE 100 MG
100 TABLET ORAL 2 TIMES DAILY
Qty: 28 TABLET | Refills: 0 | Status: SHIPPED | OUTPATIENT
Start: 2022-03-08 | End: 2022-03-22

## 2022-03-08 NOTE — PROGRESS NOTES
Elmira Michel is a 68 y.o. femalewho presents today for her medical conditions/complaints as noted below. Chief Complaint   Patient presents with    Cyst     Pt states she has an infected cyst on the back of her neck. Pt states it has started to drain.  Pharyngitis         HPI:     HPI  Infected cyst on back of neck for about a month, trying cold packs, it did drain  She's had a cyst there for a while but it was small. No fever or chills. Current Outpatient Medications   Medication Sig Dispense Refill    doxycycline hyclate (VIBRA-TABS) 100 MG tablet Take 1 tablet by mouth 2 times daily for 14 days 28 tablet 0    ciprofloxacin (CIPRO) 500 MG tablet Take 1 tablet by mouth 2 times daily for 14 days 28 tablet 0    meloxicam (MOBIC) 15 MG tablet TAKE ONE TABLET BY MOUTH DAILY 30 tablet 2    citalopram (CELEXA) 40 MG tablet TAKE ONE TABLET BY MOUTH DAILY 30 tablet 5    guaiFENesin (MUCINEX) 600 MG extended release tablet Take 1 tablet by mouth 2 times daily 60 tablet 0    pantoprazole (PROTONIX) 40 MG tablet Take 1 tablet by mouth daily 90 tablet 0    Biotin 10 MG tablet Take 10 mg by mouth daily      vitamin E 1000 units capsule Take 1,000 Units by mouth daily      levothyroxine (SYNTHROID) 50 MCG tablet TAKE ONE TABLET BY MOUTH DAILY, MONDAY THROUGH SATURDAY. SKIP SUNDAY 77 tablet 3    albuterol sulfate  (90 Base) MCG/ACT inhaler INHALE ONE TO TWO PUFFS BY MOUTH EVERY 4 HOURS AS NEEDED FOR WHEEZING OR SHORTNESS OF BREATH.  SPACE OUT TO EVERY 6 HOURS AS SYMPTOMS IMPROVE 18 g 2    calcium carbonate (OSCAL) 500 MG TABS tablet Take 500 mg by mouth daily      vitamin B-12 (CYANOCOBALAMIN) 1000 MCG tablet TAKE ONE TABLET BY MOUTH DAILY 30 tablet 2    cycloSPORINE (RESTASIS) 0.05 % ophthalmic emulsion Apply 1 drop to eye 2 times daily      Handicap Placard MISC by Does not apply route Exp - 10/21/2025 1 each 0    STOOL SOFTENER 100 MG capsule TAKE ONE CAPSULE BY MOUTH DAILY 30 capsule 2  Misc. Devices (STEP N REST WALKER) MISC 1 each by Does not apply route continuous Seated, wheeled walker 1 each 0    denosumab (PROLIA) 60 MG/ML SOSY SC injection Inject 60 mg into the skin every 6 months Indications: next due Jan 2020      polyvinyl alcohol-povidone (CLEAR EYES NATURAL TEARS) 5-6 MG/ML SOLN opthalmic solution Place 1 drop into both eyes as needed for Dry Eyes       folic acid (FOLVITE) 1 MG tablet Take 1 tablet by mouth daily 30 tablet 3    Vitamin D (CHOLECALCIFEROL) 1000 UNITS CAPS capsule Take 1,000 Units by mouth daily      clonazePAM (KLONOPIN) 0.25 MG disintegrating tablet Take 1 tablet by mouth daily as needed (tremors) for up to 30 days. (Patient not taking: Reported on 2/25/2022) 30 tablet 0    ibuprofen (ADVIL;MOTRIN) 200 MG tablet Take 400 mg by mouth every 6 hours as needed for Pain (Patient not taking: Reported on 2/25/2022)       No current facility-administered medications for this visit. Allergies   Allergen Reactions    Keflex [Cephalexin]      Diarrhea     Other      Seasonal allergies.  Bactrim [Sulfamethoxazole-Trimethoprim] Diarrhea and Nausea And Vomiting    Motrin [Ibuprofen] Diarrhea           Sulfa Antibiotics Diarrhea and Nausea And Vomiting    Wellbutrin [Bupropion] Other (See Comments)     Lightheaded, Dizziness       Subjective:     Review of Systems    Objective:     /76   Pulse 71   Temp 97.6 °F (36.4 °C) (Infrared)   Ht 5' 1\" (1.549 m)   Wt 127 lb 3.2 oz (57.7 kg)   SpO2 98%   BMI 24.03 kg/m²   Physical Exam  Vitals and nursing note reviewed. Constitutional:       Appearance: Normal appearance. HENT:      Head: Normocephalic and atraumatic. Skin:     Findings: Erythema, lesion and rash present. Comments: 2.5 wide and 1-2 cm raised on post neck, tender. Erythema, some fluctuance   Neurological:      Mental Status: She is alert and oriented to person, place, and time.    Psychiatric:         Mood and Affect: Mood normal. Behavior: Behavior normal.         Assessment:       Diagnosis Orders   1. Upper respiratory tract infection, unspecified type     2. Infected sebaceous cyst  doxycycline hyclate (VIBRA-TABS) 100 MG tablet    ciprofloxacin (CIPRO) 500 MG tablet    [facility]YR[facility] - Bailee Breaux MD, General Surgery, [address] [place] [address]:      No follow-ups on file. Orders Placed This Encounter   Procedures   Philly Mills MD, General Surgery, [place]     Referral Priority:   Routine     Referral Type:   Eval and Treat     Referral Reason:   Specialty Services Required     Referred to Provider:   Vanessa Bergman MD     Requested Specialty:   General Surgery     Number of Visits Requested:   1     Orders Placed This Encounter   Medications    doxycycline hyclate (VIBRA-TABS) 100 MG tablet     Sig: Take 1 tablet by mouth 2 times daily for 14 days     Dispense:  28 tablet     Refill:  0    ciprofloxacin (CIPRO) 500 MG tablet     Sig: Take 1 tablet by mouth 2 times daily for 14 days     Dispense:  28 tablet     Refill:  0      Reviewed medications and possibleside effects.        Electronically signed by Zarina Reynolds MD on 3/8/2022 at 2:39 PM

## 2022-04-04 RX ORDER — PANTOPRAZOLE SODIUM 40 MG/1
TABLET, DELAYED RELEASE ORAL
Qty: 90 TABLET | Refills: 0 | Status: SHIPPED | OUTPATIENT
Start: 2022-04-04

## 2022-04-29 ENCOUNTER — OFFICE VISIT (OUTPATIENT)
Dept: PODIATRY | Age: 77
End: 2022-04-29
Payer: MEDICARE

## 2022-04-29 VITALS — BODY MASS INDEX: 23.98 KG/M2 | WEIGHT: 127 LBS | HEIGHT: 61 IN

## 2022-04-29 DIAGNOSIS — M79.674 PAIN OF TOES OF BOTH FEET: ICD-10-CM

## 2022-04-29 DIAGNOSIS — M79.675 PAIN OF TOES OF BOTH FEET: ICD-10-CM

## 2022-04-29 DIAGNOSIS — B35.1 ONYCHOMYCOSIS OF TOENAIL: Primary | ICD-10-CM

## 2022-04-29 DIAGNOSIS — I73.9 PERIPHERAL VASCULAR DISEASE (HCC): ICD-10-CM

## 2022-04-29 PROCEDURE — 11721 DEBRIDE NAIL 6 OR MORE: CPT | Performed by: PODIATRIST

## 2022-04-29 PROCEDURE — 99999 PR OFFICE/OUTPT VISIT,PROCEDURE ONLY: CPT | Performed by: PODIATRIST

## 2022-04-29 ASSESSMENT — ENCOUNTER SYMPTOMS
DIARRHEA: 0
BACK PAIN: 0
COLOR CHANGE: 0
SHORTNESS OF BREATH: 0
NAUSEA: 0

## 2022-04-29 NOTE — PROGRESS NOTES
SUBJECTIVE: Lakeshia Adams is a 68 y.o. female who returns to the office with chief complaint of painful fungal toenails. Patient relates toe nails are thickened/difficult to trim as well as painful with ambulation and with shoe gear. Chief Complaint   Patient presents with    Nail Problem     nail trim/last saw Marika Hackett 3/8/22     Review of Systems   Constitutional: Negative for activity change, appetite change, chills, diaphoresis, fatigue and fever. Respiratory: Negative for shortness of breath. Cardiovascular: Negative for leg swelling. Gastrointestinal: Negative for diarrhea and nausea. Endocrine: Negative for cold intolerance, heat intolerance and polyuria. Musculoskeletal: Positive for arthralgias. Negative for back pain, gait problem, joint swelling and myalgias. Skin: Negative for color change, pallor, rash and wound. Allergic/Immunologic: Negative for environmental allergies and food allergies. Neurological: Negative for dizziness, weakness, light-headedness and numbness. Hematological: Does not bruise/bleed easily. Psychiatric/Behavioral: Negative for behavioral problems, confusion and self-injury. The patient is not nervous/anxious. OBJECTIVE: Clinical evaluation of patient reveals nails 1,2,3,4,5 of the right foot and nails 1,2,3,4,5 of the left foot to present with thickness, elongation, discoloration, brittleness, and subungual debris. There was pain with palpation and debridement of the toenails of the bilateral feet. No open lesions noted to either foot today. The right DP pulse is palpable. The left DP pulse is palpable. The right PT pulse is not palpable. The left PT pulse is not palpable. Class A Findings (1 needed)   [] Non-traumatic amputation of foot or integral skeleton portion thereof. [] Q7.      Class B Findings (2 needed)   1. [] Absent posterior tibial pulse   2. [] Absent dorsalis pedis pulse   3.  [] Advanced trophic changes; three of the following are required:   ·         [] hair growth (decrease or absence)   ·         [] nail changes (thickening)   ·         [] pigmentary changes (discoloration)   ·         [] skin texture (thin, shiny)   ·         [] skin color (rubor or redness)   [] Q8.      Class C Findings (1 Class B, 2 Class C needed)   1. [] Claudication   2. [] Temperature changes   3. [] Edema   4. [] Paresthesia   5. [] Burning   [] Q9.     NO CLASS FINDINGS ARE NOTED    ASSESSMENT:    Diagnosis Orders   1. Onychomycosis of toenail  NC DEBRIDEMENT OF NAILS, 6 OR MORE   2. Pain of toes of both feet  NC DEBRIDEMENT OF NAILS, 6 OR MORE   3. Peripheral vascular disease (HCC)  NC DEBRIDEMENT OF NAILS, 6 OR MORE     PLAN: Toenails 1,2,3,4,5 of the right foot and 1,2,3,4,5 of the left foot were debrided in length and thickness using a nail nipper and a . Return in about 9 weeks (around 7/1/2022) for Painful fungal nails.    4/29/2022      Antwon Oneil DPM

## 2022-06-06 ENCOUNTER — HOSPITAL ENCOUNTER (OUTPATIENT)
Dept: PREADMISSION TESTING | Age: 77
Discharge: HOME | End: 2022-06-10

## 2022-06-06 VITALS — HEIGHT: 61 IN | WEIGHT: 127 LBS | BODY MASS INDEX: 23.98 KG/M2

## 2022-06-06 RX ORDER — IBUPROFEN 600 MG/1
600 TABLET ORAL EVERY 6 HOURS PRN
COMMUNITY

## 2022-06-06 NOTE — PROGRESS NOTES
Pre-op Instructions For Out-Patient Surgery    Medication Instructions:  · Please stop herbs and any supplements now (includes vitamins and minerals). Folic acid, b 12, vit d/calcium, biotin    · Please contact your surgeon and prescribing physician for pre-op instructions for any blood thinners. Meloxicam, ibuprofen    · If you have inhalers/aerosol treatments at home, please use them the morning of your surgery and bring the inhalers with you to the hospital.- does not use inhaler since quitting smoking    · Please take the following medications the morning of your surgery with a sip of water: syntroid    Surgery Instructions:  1. After midnight before surgery:  Do not eat or drink anything, including water, mints, gum, and hard candy. You may brush your teeth without swallowing. No smoking, chewing tobacco, or street drugs. 2. Please shower or bathe before surgery. If you were given Surgical Scrub Chlorhexidine Gluconate Liquid (CHG), please shower the night before and the morning of your surgery following the detailed instructions you received during your pre-admission visit. 3. Please do not wear any cologne, lotion, powder, deodorant, jewelry, piercings, perfume, makeup, nail polish, hair accessories, or hair spray on the day of surgery. Wear loose comfortable clothing. 4. Leave your valuables at home. Bring a storage case for any glasses/contacts. 5. An adult who is responsible for you MUST drive you home and should be with you for the first 24 hours after surgery. The Day of Surgery:  · Arrive at [address] [place] [address] Surgery Entrance at the time directed by your surgeon and check in at the desk. · If you have a living will or healthcare power of , please bring a copy. - will bring if she can find it. · You will be taken to the pre-op holding area where you will be prepared for surgery.   A physical assessment will be performed by a nurse

## 2022-06-10 ENCOUNTER — HOSPITAL ENCOUNTER (OUTPATIENT)
Age: 77
Setting detail: OUTPATIENT SURGERY
Discharge: HOME | End: 2022-06-10
Attending: SURGERY | Admitting: SURGERY
Payer: MEDICARE

## 2022-06-10 VITALS
SYSTOLIC BLOOD PRESSURE: 128 MMHG | HEIGHT: 61 IN | BODY MASS INDEX: 23.98 KG/M2 | OXYGEN SATURATION: 100 % | RESPIRATION RATE: 16 BRPM | WEIGHT: 127 LBS | TEMPERATURE: 97.8 F | HEART RATE: 65 BPM | DIASTOLIC BLOOD PRESSURE: 72 MMHG

## 2022-06-10 DIAGNOSIS — L98.9 SKIN LESION: ICD-10-CM

## 2022-06-10 DIAGNOSIS — D36.7 DERMOID CYST OF NECK: ICD-10-CM

## 2022-06-10 PROCEDURE — 3600000012 HC SURGERY LEVEL 2 ADDTL 15MIN: Performed by: SURGERY

## 2022-06-10 PROCEDURE — 99999 PR OFFICE/OUTPT VISIT,PROCEDURE ONLY: CPT | Performed by: PHYSICIAN ASSISTANT

## 2022-06-10 PROCEDURE — 2709999900 HC NON-CHARGEABLE SUPPLY: Performed by: SURGERY

## 2022-06-10 PROCEDURE — 88305 TISSUE EXAM BY PATHOLOGIST: CPT

## 2022-06-10 PROCEDURE — 7100000010 HC PHASE II RECOVERY - FIRST 15 MIN: Performed by: SURGERY

## 2022-06-10 PROCEDURE — 3600000002 HC SURGERY LEVEL 2 BASE: Performed by: SURGERY

## 2022-06-10 PROCEDURE — 7100000011 HC PHASE II RECOVERY - ADDTL 15 MIN: Performed by: SURGERY

## 2022-06-10 PROCEDURE — 2500000003 HC RX 250 WO HCPCS: Performed by: SURGERY

## 2022-06-10 RX ORDER — LIDOCAINE HYDROCHLORIDE 10 MG/ML
INJECTION, SOLUTION INFILTRATION; PERINEURAL PRN
Status: DISCONTINUED | OUTPATIENT
Start: 2022-06-10 | End: 2022-06-10 | Stop reason: ALTCHOICE

## 2022-06-10 RX ORDER — DOXYCYCLINE HYCLATE 100 MG
100 TABLET ORAL 2 TIMES DAILY
Qty: 20 TABLET | Refills: 0 | Status: SHIPPED | OUTPATIENT
Start: 2022-06-10 | End: 2022-06-20

## 2022-06-10 ASSESSMENT — ENCOUNTER SYMPTOMS
BACK PAIN: 1
COUGH: 0
ABDOMINAL PAIN: 0
SINUS PAIN: 0
CONSTIPATION: 0
WHEEZING: 0
SINUS PRESSURE: 0
SHORTNESS OF BREATH: 0
VOMITING: 0
DIARRHEA: 0
SORE THROAT: 0
NAUSEA: 0
BLOOD IN STOOL: 0

## 2022-06-10 ASSESSMENT — PAIN - FUNCTIONAL ASSESSMENT: PAIN_FUNCTIONAL_ASSESSMENT: 0-10

## 2022-06-10 NOTE — OP NOTE
Operative Note      Patient: Torito Bowman  YOB: 1945  MRN: [id number]    Date of Procedure: 6/10/2022                Preoperative diagnosis: Symptomatic cyst posterior neck    Postoperative diagnosis: Same    Procedure: Excision symptomatic cyst posterior neck 2.5 x 1.5 x 1.5 cm    Surgeon: Dr. Joanne Varma    Asst.: SEEMA Guadalupe    Anesthesia: Local    Preparation: Hibiclens    EBL: Less than 10 mL    Specimen: Cyst    Procedure: Informed consent was obtained. Site was marked and confirmed. Patient was taken to the operating room. She was lying in a right lateral position. Operative site was prepped and draped in usual sterile fashion. Timeout was done. Local anesthetic was infiltrated at the marked site. Vital signs were monitored remained stable throughout the procedure. Incision was made at the marked site. Incision was deepened with help of Bovie cautery. Approximately 2.5 x 1.5 x 1.5 cm cyst was excised. Specimen was submitted to pathology. Wound was explored. Hemostasis was confirmed. Sponge needle instrument count was found to be correct. Wound was approximated using absorbable sutures. Dermabond was applied. Patient tolerated procedure well and was transferred to the recovery room in a stable condition.     -  Recommendations: Findings discussed with the family and the patient. Postoperative care discussed. Prescription of antibiotic called in.

## 2022-06-10 NOTE — H&P
HISTORY and Treinta LUIS ALBERT[facility] [address]       NAME:  Car Coffman  MRN: [id number]   YOB: 1945   Date: 6/10/2022   Age: 68 y.o. Gender: female       COMPLAINT AND PRESENT HISTORY:     Car Coffman is 68 y.o. female, here for neck cyst/lesion biopsy excision. Pt initially noticed lesion to posterior right neck approximately four months ago. She reports she had pus drainage and bloody drainage approximately two months ago. Prior to that she was applying cold compresses to posterior neck. Reports lesion is \"irritating\" but not painful. Denies current drainage or bleeding. Denies being prescribed any antibiotics for treatment of lesion. Denies prior treatments or I&D of lesion. Reports she had four teeth extracted last week. She is going to have all teeth extracted upcoming and will be getting dentures. She is having pain in her mouth this am but did not take any pain medications this am. She is taking extra strength tylenol. NPO as per instructed. No medications taken this am. Stopped mobic and ibuprofen 5 days ago. Denies taking any other blood thinning medications. Denies chest pain/pressure, palpitations, SOB, recent URI, fever or chills.        PAST MEDICAL HISTORY     Past Medical History:   Diagnosis Date    Alcoholism ([address] 10/20/2016    Anxiety     Cataract     Chronic pain     [facility]     Depression     Hematochezia 4/7/2017    Hypothyroid     Osteoporosis     Peptic ulcer of duodenum     chronic    Schatzki's ring     Sessile rectal polyp 04/10/2017    multi tiny    Tobacco abuse     Tubular adenoma of colon 04/10/2017    x2       SURGICAL HISTORY       Past Surgical History:   Procedure Laterality Date    CARPAL TUNNEL RELEASE Right     CATARACT REMOVAL      CHOLECYSTECTOMY, LAPAROSCOPIC N/A 5/10/2021    CHOLECYSTECTOMY LAPAROSCOPIC ROBOTIC XI performed by Ayaka Quinones MD at [address] [place] [address] 04/10/2017    multi tiny sessile polyps; poor prep; tubular adenoma x 2    FINGER FRACTURE SURGERY Left 06/25/2021    5TH METACARPAL OPEN REDUCTION INTERNAL FIXATION WITH SYNTHES     FINGER SURGERY Left 6/25/2021    LEFT 5TH METACARPAL OPEN REDUCTION INTERNAL FIXATION WITH SYNTHES performed by Aishwarya Herrera MD at [facility]  10/27/2016    kyphoplasty L1 and L5    AR COLSC FLX W/RMVL OF TUMOR POLYP LESION SNARE TQ N/A 4/10/2017    COLONOSCOPY POLYPECTOMY SNARE x2 with photos performed by Marii Carrera MD at [address] [place] [address] EGD TRANSORAL BIOPSY SINGLE/MULTIPLE N/A 4/10/2017    EGD BIOPSY x2 with photos performed by Marii Carrera MD at [facility] ARTHROSCOPY Right     x 2    TUBAL LIGATION      UPPER GASTROINTESTINAL ENDOSCOPY  04/10/2017    schatzki's ring; chronic peptic duodenitis       FAMILY HISTORY       Family History   Problem Relation Age of Onset    Heart Disease Mother     Asthma Mother     Heart Disease Father        SOCIAL HISTORY       Social History     Socioeconomic History    Marital status:      Spouse name: Not on file    Number of children: Not on file    Years of education: Not on file    Highest education level: Not on file   Occupational History    Not on file   Tobacco Use    Smoking status: Former Smoker     Packs/day: 0.25     Years: 65.00     Pack years: 16.25     Types: Cigarettes    Smokeless tobacco: Never Used    Tobacco comment: quit 1/2022   Vaping Use    Vaping Use: Some days    Substances: Never   Substance and Sexual Activity    Alcohol use:  Yes     Alcohol/week: 0.0 standard drinks     Comment: occasionally- malt liquor- mikes hard lemonade    Drug use: No    Sexual activity: Not Currently     Partners: Male   Other Topics Concern    Not on file   Social History Narrative    Not on file     Social Determinants of Health     Financial Resource Strain: Low Risk     Difficulty of Paying Living Expenses: Not hard at all   Food Insecurity: No Food Insecurity    Worried About Running Out of Food in the Last Year: Never true    Ran Out of Food in the Last Year: Never true   Transportation Needs:     Lack of Transportation (Medical): Not on file    Lack of Transportation (Non-Medical): Not on file   Physical Activity: Insufficiently Active    Days of Exercise per Week: 3 days    Minutes of Exercise per Session: 10 min   Stress:     Feeling of Stress : Not on file   Social Connections:     Frequency of Communication with Friends and Family: Not on file    Frequency of Social Gatherings with Friends and Family: Not on file    Attends Confucianist Services: Not on file    Active Member of Clubs or Organizations: Not on file    Attends Club or Organization Meetings: Not on file    Marital Status: Not on file   Intimate Partner Violence:     Fear of Current or Ex-Partner: Not on file    Emotionally Abused: Not on file    Physically Abused: Not on file    Sexually Abused: Not on file   Housing Stability: [address] Unable to Pay for Housing in the Last Year: No    Number of [place] in the Last Year: 1    Unstable Housing in the Last Year: No        REVIEW OF SYSTEMS      Allergies   Allergen Reactions    Keflex [Cephalexin]      Diarrhea     Other      Seasonal allergies.  Bactrim [Sulfamethoxazole-Trimethoprim] Diarrhea and Nausea And Vomiting    Sulfa Antibiotics Diarrhea and Nausea And Vomiting    Wellbutrin [Bupropion] Other (See Comments)     Lightheaded, Dizziness       No current facility-administered medications on file prior to encounter.      Current Outpatient Medications on File Prior to Encounter   Medication Sig Dispense Refill    pantoprazole (PROTONIX) 40 MG tablet TAKE ONE TABLET BY MOUTH DAILY 90 tablet 0    meloxicam (MOBIC) 15 MG tablet TAKE ONE TABLET BY MOUTH DAILY 30 tablet 2    citalopram (CELEXA) 40 MG tablet TAKE ONE TABLET BY MOUTH DAILY 30 tablet 5    Biotin 10 MG tablet Take 10 mg by mouth daily       clonazePAM (KLONOPIN) 0.25 MG disintegrating tablet Take 1 tablet by mouth daily as needed (tremors) for up to 30 days. (Patient not taking: Reported on 2/25/2022) 30 tablet 0    levothyroxine (SYNTHROID) 50 MCG tablet TAKE ONE TABLET BY MOUTH DAILY, MONDAY THROUGH SATURDAY. SKIP SUNDAY 77 tablet 3    albuterol sulfate  (90 Base) MCG/ACT inhaler INHALE ONE TO TWO PUFFS BY MOUTH EVERY 4 HOURS AS NEEDED FOR WHEEZING OR SHORTNESS OF BREATH. SPACE OUT TO EVERY 6 HOURS AS SYMPTOMS IMPROVE 18 g 2    vitamin B-12 (CYANOCOBALAMIN) 1000 MCG tablet TAKE ONE TABLET BY MOUTH DAILY 30 tablet 2    cycloSPORINE (RESTASIS) 0.05 % ophthalmic emulsion Apply 1 drop to eye 2 times daily      Handicap Placard MISC by Does not apply route Exp - 10/21/2025 1 each 0    Misc. Devices (STEP N REST WALKER) MISC 1 each by Does not apply route continuous Seated, wheeled walker 1 each 0    folic acid (FOLVITE) 1 MG tablet Take 1 tablet by mouth daily 30 tablet 3    Vitamin D (CHOLECALCIFEROL) 1000 UNITS CAPS capsule Take 1,000 Units by mouth daily      Notation: Above medications are not currently reconciled at time of signing this H&P note, to be reconciled in pre-op per RN. Review of Systems   Constitutional: Positive for appetite change. Negative for chills, fever and unexpected weight change. HENT: Positive for dental problem (Recently had four teeth extracted. ). Negative for congestion, sinus pressure, sinus pain and sore throat. Eyes: Positive for visual disturbance (Glasses). Respiratory: Negative for cough, shortness of breath and wheezing. Cardiovascular: Negative for chest pain, palpitations and leg swelling. Gastrointestinal: Negative for abdominal pain, blood in stool, constipation, diarrhea, nausea and vomiting. Reports gas and mucousy stools    Genitourinary: Negative.     Musculoskeletal: Positive for back pain, gait problem [facility] with cane for ambulation assistance) and neck pain. Skin: Negative for rash and wound. Posterior right neck with mobile mass with intact overlying skin, no open wound, no erythema, drainage or bleeding   Neurological: Negative for dizziness, speech difficulty, light-headedness and headaches. Hematological: Does not bruise/bleed easily. Psychiatric/Behavioral: Negative. GENERAL PHYSICAL EXAM     Vitals: Review vitals per RN flowsheet. Physical Exam  Constitutional:       General: She is not in acute distress. Appearance: She is well-developed. She is not ill-appearing. HENT:      Head: Normocephalic and atraumatic. Nose: Nose normal.      Mouth/Throat:      Mouth: Mucous membranes are moist.      Pharynx: Oropharynx is clear. No oropharyngeal exudate or posterior oropharyngeal erythema. Eyes:      General: No scleral icterus. Right eye: No discharge. Left eye: No discharge. Pupils: Pupils are equal, round, and reactive to light. Neck:      Trachea: No tracheal deviation. Cardiovascular:      Rate and Rhythm: Normal rate and regular rhythm. Heart sounds: Normal heart sounds. No murmur heard. No friction rub. No gallop. Pulmonary:      Effort: Pulmonary effort is normal. No respiratory distress. Breath sounds: Normal breath sounds. No wheezing, rhonchi or rales. Abdominal:      General: Bowel sounds are normal. There is no distension. Palpations: Abdomen is soft. Tenderness: There is no abdominal tenderness. There is no guarding. Musculoskeletal:         General: Tenderness (lower back) present. No swelling. Cervical back: Neck supple. Right lower leg: No edema. Left lower leg: No edema. Skin:     General: Skin is warm and dry. Coloration: Skin is not jaundiced. Findings: No bruising or erythema.       Comments: Posterior right neck with mobile mass with intact overlying skin, no open wound, no erythema, drainage or bleeding   Neurological:      Mental Status: She is alert and oriented to person, place, and time. Cranial Nerves: No cranial nerve deficit. Gait: Gait abnormal (antalgic gait, cane for ambulation assistance).    Psychiatric:         Mood and Affect: Mood normal.        PROVISIONAL DIAGNOSES / SURGERY:      NECK LESION BIOPSY EXCISION CYST    CYST OF NECK    Patient Active Problem List    Diagnosis Date Noted    Closed displaced fracture of base of fifth metacarpal bone of left hand with routine healing 06/25/2021    Essential tremor 06/24/2021    Chronic cholecystitis     Chest pain 12/02/2019    Fracture of right shoulder with routine healing 07/25/2019    Overflow incontinence 07/15/2019    Chronic bilateral low back pain without sciatica 01/08/2019    Closed fracture of multiple ribs of left side with routine healing 02/28/2018    Chronic obstructive pulmonary disease ([address] 08/25/2017    History of Helicobacter pylori infection 07/14/2017    Schatzki's ring     Peptic ulcer of duodenum     Electrolyte imbalance 04/24/2017    Sessile rectal polyp 04/10/2017    Tubular adenoma of colon 04/10/2017    Diarrhea 04/07/2017    Hyponatremia 04/07/2017    Hypokalemia 04/07/2017    Dysthymia 03/02/2017    Medication management 03/02/2017    Compression fx, lumbar spine ([address] 10/25/2016    Osteoporosis 10/25/2016    Compressed spine fracture ([address] 10/20/2016    Closed fracture of second lumbar vertebra ([address] 10/20/2016    Closed fracture of ninth thoracic vertebra ([address] 10/20/2016    Menopausal osteoporosis 10/20/2016    Alcoholism ([address] 10/20/2016    Other specified hypothyroidism 07/26/2016    Chronic pain 07/26/2016    Anorexia 10/16/2015    Arthritis 10/16/2015    Hypothyroidism 10/16/2015    Memory loss 10/16/2015    Menopause 10/16/2015    Muscle weakness 10/16/2015    Tremor 10/16/2015           GILDA Rivera - CNP on 6/10/2022 at 9:28 AM

## 2022-06-12 DIAGNOSIS — G89.29 OTHER CHRONIC PAIN: ICD-10-CM

## 2022-06-12 DIAGNOSIS — S32.000S COMPRESSION FRACTURE OF LUMBAR VERTEBRA, UNSPECIFIED LUMBAR VERTEBRAL LEVEL, SEQUELA: ICD-10-CM

## 2022-06-12 DIAGNOSIS — M19.90 ARTHRITIS: ICD-10-CM

## 2022-06-13 RX ORDER — MELOXICAM 15 MG/1
TABLET ORAL
Qty: 30 TABLET | Refills: 2 | Status: SHIPPED | OUTPATIENT
Start: 2022-06-13

## 2022-06-14 LAB — DERMATOLOGY PATHOLOGY REPORT: NORMAL

## 2022-08-10 DIAGNOSIS — E03.9 HYPOTHYROIDISM, UNSPECIFIED TYPE: ICD-10-CM

## 2022-08-11 RX ORDER — LEVOTHYROXINE SODIUM 0.05 MG/1
TABLET ORAL
Qty: 77 TABLET | Refills: 3 | Status: SHIPPED | OUTPATIENT
Start: 2022-08-11

## 2022-09-07 RX ORDER — CITALOPRAM 40 MG/1
TABLET ORAL
Qty: 30 TABLET | Refills: 5 | Status: SHIPPED | OUTPATIENT
Start: 2022-09-07

## 2023-01-19 ENCOUNTER — OFFICE VISIT (OUTPATIENT)
Dept: PRIMARY CARE CLINIC | Age: 78
End: 2023-01-19
Payer: COMMERCIAL

## 2023-01-19 VITALS
HEIGHT: 61 IN | BODY MASS INDEX: 24.35 KG/M2 | HEART RATE: 94 BPM | SYSTOLIC BLOOD PRESSURE: 124 MMHG | WEIGHT: 129 LBS | DIASTOLIC BLOOD PRESSURE: 68 MMHG | OXYGEN SATURATION: 98 %

## 2023-01-19 DIAGNOSIS — Z23 NEED FOR VACCINATION: ICD-10-CM

## 2023-01-19 DIAGNOSIS — Z79.899 MEDICATION MANAGEMENT: ICD-10-CM

## 2023-01-19 DIAGNOSIS — E87.1 HYPONATREMIA: ICD-10-CM

## 2023-01-19 DIAGNOSIS — E03.9 HYPOTHYROIDISM, UNSPECIFIED TYPE: Primary | ICD-10-CM

## 2023-01-19 DIAGNOSIS — M81.0 MENOPAUSAL OSTEOPOROSIS: ICD-10-CM

## 2023-01-19 DIAGNOSIS — F10.20 ALCOHOLISM (HCC): ICD-10-CM

## 2023-01-19 DIAGNOSIS — J44.9 CHRONIC OBSTRUCTIVE PULMONARY DISEASE, UNSPECIFIED COPD TYPE (HCC): ICD-10-CM

## 2023-01-19 DIAGNOSIS — F33.2 SEVERE EPISODE OF RECURRENT MAJOR DEPRESSIVE DISORDER, WITHOUT PSYCHOTIC FEATURES (HCC): Chronic | ICD-10-CM

## 2023-01-19 DIAGNOSIS — E03.9 HYPOTHYROIDISM, UNSPECIFIED TYPE: ICD-10-CM

## 2023-01-19 LAB
ALBUMIN SERPL-MCNC: 4.1 G/DL (ref 3.5–5.2)
ALBUMIN/GLOBULIN RATIO: 1.5 (ref 1–2.5)
ALP BLD-CCNC: 85 U/L (ref 35–104)
ALT SERPL-CCNC: 19 U/L (ref 5–33)
ANION GAP SERPL CALCULATED.3IONS-SCNC: 12 MMOL/L (ref 9–17)
AST SERPL-CCNC: 20 U/L
BILIRUB SERPL-MCNC: 0.5 MG/DL (ref 0.3–1.2)
BUN BLDV-MCNC: 18 MG/DL (ref 8–23)
CALCIUM SERPL-MCNC: 9.5 MG/DL (ref 8.6–10.4)
CHLORIDE BLD-SCNC: 95 MMOL/L (ref 98–107)
CO2: 26 MMOL/L (ref 20–31)
CREAT SERPL-MCNC: 0.79 MG/DL (ref 0.5–0.9)
GFR SERPL CREATININE-BSD FRML MDRD: >60 ML/MIN/1.73M2
GLUCOSE FASTING: 84 MG/DL (ref 70–99)
POTASSIUM SERPL-SCNC: 4.6 MMOL/L (ref 3.7–5.3)
SODIUM BLD-SCNC: 133 MMOL/L (ref 135–144)
THYROXINE, FREE: 3.15 NG/DL (ref 0.93–1.7)
TOTAL PROTEIN: 6.9 G/DL (ref 6.4–8.3)
TSH SERPL DL<=0.05 MIU/L-ACNC: <0.01 UIU/ML (ref 0.3–5)

## 2023-01-19 PROCEDURE — 90471 IMMUNIZATION ADMIN: CPT | Performed by: FAMILY MEDICINE

## 2023-01-19 PROCEDURE — 99214 OFFICE O/P EST MOD 30 MIN: CPT | Performed by: FAMILY MEDICINE

## 2023-01-19 PROCEDURE — 1123F ACP DISCUSS/DSCN MKR DOCD: CPT | Performed by: FAMILY MEDICINE

## 2023-01-19 PROCEDURE — 90694 VACC AIIV4 NO PRSRV 0.5ML IM: CPT | Performed by: FAMILY MEDICINE

## 2023-01-19 RX ORDER — CITALOPRAM 40 MG/1
40 TABLET ORAL NIGHTLY
Qty: 30 TABLET | Refills: 5 | Status: SHIPPED | OUTPATIENT
Start: 2023-01-19 | End: 2023-02-18

## 2023-01-19 RX ORDER — MINERAL OIL, LIGHT/MINERAL OIL 1%-4.5%
1 DROPS OPHTHALMIC (EYE) 2 TIMES DAILY
COMMUNITY

## 2023-01-19 RX ORDER — ALBUTEROL SULFATE 90 UG/1
AEROSOL, METERED RESPIRATORY (INHALATION)
Qty: 18 G | Refills: 2 | Status: SHIPPED | OUTPATIENT
Start: 2023-01-19

## 2023-01-19 ASSESSMENT — PATIENT HEALTH QUESTIONNAIRE - PHQ9
5. POOR APPETITE OR OVEREATING: 0
4. FEELING TIRED OR HAVING LITTLE ENERGY: 0
7. TROUBLE CONCENTRATING ON THINGS, SUCH AS READING THE NEWSPAPER OR WATCHING TELEVISION: 0
SUM OF ALL RESPONSES TO PHQ QUESTIONS 1-9: 4
6. FEELING BAD ABOUT YOURSELF - OR THAT YOU ARE A FAILURE OR HAVE LET YOURSELF OR YOUR FAMILY DOWN: 0
3. TROUBLE FALLING OR STAYING ASLEEP: 0
10. IF YOU CHECKED OFF ANY PROBLEMS, HOW DIFFICULT HAVE THESE PROBLEMS MADE IT FOR YOU TO DO YOUR WORK, TAKE CARE OF THINGS AT HOME, OR GET ALONG WITH OTHER PEOPLE: 0
2. FEELING DOWN, DEPRESSED OR HOPELESS: 2
SUM OF ALL RESPONSES TO PHQ QUESTIONS 1-9: 4
8. MOVING OR SPEAKING SO SLOWLY THAT OTHER PEOPLE COULD HAVE NOTICED. OR THE OPPOSITE, BEING SO FIGETY OR RESTLESS THAT YOU HAVE BEEN MOVING AROUND A LOT MORE THAN USUAL: 0
1. LITTLE INTEREST OR PLEASURE IN DOING THINGS: 2
SUM OF ALL RESPONSES TO PHQ9 QUESTIONS 1 & 2: 4
9. THOUGHTS THAT YOU WOULD BE BETTER OFF DEAD, OR OF HURTING YOURSELF: 0

## 2023-01-19 NOTE — PROGRESS NOTES
[facility] PRIMARY [facility]  [address] [facility] [address]  [address] [address]  Dept: Neha Monae is a 68 y.o. female Established patient, who presents today for her medical conditions/complaintsas noted below. Chief Complaint   Patient presents with    Other     Pt would like to discuss driving    Hypothyroidism     Med check       HPI:     HPI   driving license. Doesn't usually drive much. Only drinking hard lemonade once a day. Not drinking liqour. Dry eyes, sees eye doctor  Spouse in the hospital.   Having depression, wants to sleep a lot. Takes all meds in am.   Reviewed prior notes None  Reviewed previous Labs and Imaging    Patient states her son drove her to this appointment.     LDL Cholesterol (mg/dL)   Date Value   2020 117       (goal LDL is <100)   AST (U/L)   Date Value   2021 16     ALT (U/L)   Date Value   2021 7     BUN (mg/dL)   Date Value   2021 16     TSH (mIU/L)   Date Value   2021 1.84     BP Readings from Last 3 Encounters:   23 124/68   06/10/22 128/72   22 124/76          (goal 120/80)    Past Medical History:   Diagnosis Date    Alcoholism ([facility] 10/20/2016    Anxiety     Cataract     Chronic pain     COPD (chronic obstructive pulmonary disease) ([facility]     Depression     Hematochezia 2017    Hypothyroid     Osteoporosis     Peptic ulcer of duodenum     chronic    Schatzki's ring     Sessile rectal polyp 04/10/2017    multi tiny    Tobacco abuse     Tubular adenoma of colon 04/10/2017    x2      Past Surgical History:   Procedure Laterality Date    CARPAL TUNNEL RELEASE Right     CATARACT REMOVAL      CHOLECYSTECTOMY, LAPAROSCOPIC N/A 5/10/2021    CHOLECYSTECTOMY LAPAROSCOPIC ROBOTIC XI performed by Violette Riley MD at [address] [address]  04/10/2017    multi tiny sessile polyps; poor prep; tubular adenoma x 2    FINGER FRACTURE SURGERY Left 2021    5TH METACARPAL OPEN REDUCTION INTERNAL FIXATION WITH SYNTHES     FINGER SURGERY Left 2021    LEFT 5TH METACARPAL OPEN REDUCTION INTERNAL FIXATION WITH SYNTHES performed by Ngoc Mustafa MD at [facility] (CERVIX STATUS UNKNOWN)      KYPHOSIS SURGERY  10/27/2016    kyphoplasty L1 and L5    NECK SURGERY N/A 6/10/2022    NECK LESION BIOPSY EXCISION CYST performed by April Valverde MD at [place] [address] FLX W/RMVL OF TUMOR POLYP LESION SNARE TQ N/A 4/10/2017    COLONOSCOPY POLYPECTOMY SNARE x2 with photos performed by Lucy Dodd MD at [facility] EGD TRANSORAL BIOPSY SINGLE/MULTIPLE N/A 4/10/2017    EGD BIOPSY x2 with photos performed by Lucy Dodd MD at [address] [place] [address] ARTHROSCOPY Right     x 2    TUBAL LIGATION      UPPER GASTROINTESTINAL ENDOSCOPY  04/10/2017    schatzki's ring; chronic peptic duodenitis       Family History   Problem Relation Age of Onset    Heart Disease Mother     Asthma Mother     Heart Disease Father        Social History     Tobacco Use    Smoking status: Former     Packs/day: 0.25     Years: 65.00     Pack years: 16.25     Types: Cigarettes     Start date:      Quit date:      Years since quittin.0    Smokeless tobacco: Never    Tobacco comments:     quit 2022   Substance Use Topics    Alcohol use: Yes     Alcohol/week: 0.0 standard drinks     Comment: occasionally- malt liquor- mikes hard lemonade      Current Outpatient Medications   Medication Sig Dispense Refill    Carboxymethylcellul-Glycerin 1-0.9 % GEL Apply 1 drop to eye 2 times daily      Artificial Tear Solution (SOOTHE XP XTRA PROTECTION) SOLN Apply 1 drop to eye 2 times daily      citalopram (CELEXA) 40 MG tablet Take 1 tablet by mouth at bedtime 30 tablet 5    albuterol sulfate HFA (PROVENTIL;VENTOLIN;PROAIR) 108 (90 Base) MCG/ACT inhaler INHALE ONE TO TWO PUFFS BY MOUTH EVERY 4 HOURS AS NEEDED FOR WHEEZING OR SHORTNESS OF BREATH.  SPACE OUT TO EVERY 6 HOURS AS SYMPTOMS IMPROVE 18 g 2    levothyroxine (SYNTHROID) 50 MCG tablet TAKE ONE TABLET BY MOUTH DAILY, MONDAY THROUGH SATURDAY. SKIP SUNDAY 77 tablet 3    meloxicam (MOBIC) 15 MG tablet TAKE ONE TABLET BY MOUTH DAILY 30 tablet 2    ibuprofen (ADVIL;MOTRIN) 600 MG tablet Take 600 mg by mouth every 6 hours as needed for Pain      Biotin 10 MG tablet Take 10 mg by mouth daily       vitamin B-12 (CYANOCOBALAMIN) 1000 MCG tablet TAKE ONE TABLET BY MOUTH DAILY 30 tablet 2    cycloSPORINE (RESTASIS) 0.05 % ophthalmic emulsion Apply 1 drop to eye 2 times daily      Handicap Placard MISC by Does not apply route Exp - 10/21/2025 1 each 0    Misc. Devices (STEP N REST WALKER) MISC 1 each by Does not apply route continuous Seated, wheeled walker 1 each 0    folic acid (FOLVITE) 1 MG tablet Take 1 tablet by mouth daily 30 tablet 3    Vitamin D (CHOLECALCIFEROL) 1000 UNITS CAPS capsule Take 1,000 Units by mouth daily        No current facility-administered medications for this visit. Allergies   Allergen Reactions    Keflex [Cephalexin]      Diarrhea     Other      Seasonal allergies.      Bactrim [Sulfamethoxazole-Trimethoprim] Diarrhea and Nausea And Vomiting    Sulfa Antibiotics Diarrhea and Nausea And Vomiting    Wellbutrin [Bupropion] Other (See Comments)     Lightheaded, Dizziness       Health Maintenance   Topic Date Due    Hepatitis C screen  Never done    Shingles vaccine (1 of 2) Never done    COVID-19 Vaccine (4 - Booster for Moderna series) 02/21/2022    Depression Monitoring  01/19/2024    DTaP/Tdap/Td vaccine (4 - Td or Tdap) 06/19/2031    DEXA (modify frequency per FRAX score)  Completed    Flu vaccine  Completed    Pneumococcal 65+ years Vaccine  Completed    Hepatitis A vaccine  Aged Out    Hib vaccine  Aged Out    Meningococcal (ACWY) vaccine  Aged Out       Subjective:      Review of Systems    Objective:     /68   Pulse 94   Ht 5' 1\" (1.549 m)   Wt 129 lb (58.5 kg)   SpO2 98%   BMI 24.37 kg/m²   Physical Exam  Vitals and nursing note reviewed. Constitutional:       General: She is not in acute distress. Appearance: She is well-developed. She is not ill-appearing. HENT:      Head: Normocephalic and atraumatic. Right Ear: External ear normal.      Left Ear: External ear normal.   Eyes:      General: No scleral icterus. Right eye: No discharge. Left eye: No discharge. Conjunctiva/sclera: Conjunctivae normal.   Neck:      Thyroid: No thyromegaly. Trachea: No tracheal deviation. Comments: Neck range of motion is decreased but acceptable for driving. Cardiovascular:      Rate and Rhythm: Normal rate and regular rhythm. Heart sounds: Normal heart sounds. Pulmonary:      Effort: Pulmonary effort is normal. No respiratory distress. Breath sounds: Normal breath sounds. No wheezing. Musculoskeletal:      Comments: Shoulders range of motion is slightly decreased but acceptable.  strength 4-5 over 5 bilaterally. Toes dorsiflexion strength 5/5 on the right, 4/5 on the left. Knee extension strength 5/5 on the right,  4/5 on the left  Patient able to get up from a chair under 5 seconds. She is able to walk steadily to the door and back without the aid of a cane or walker. Lymphadenopathy:      Cervical: No cervical adenopathy. Skin:     General: Skin is warm. Findings: No rash. Neurological:      Mental Status: She is alert. Deep Tendon Reflexes:      Reflex Scores:       Bicep reflexes are 2+ on the right side and 2+ on the left side. Brachioradialis reflexes are 2+ on the right side and 2+ on the left side. Patellar reflexes are 3+ on the right side and 3+ on the left side. Achilles reflexes are 1+ on the right side and 1+ on the left side. Comments: Alert   Psychiatric:         Mood and Affect: Mood normal.         Behavior: Behavior normal.       Assessment:       Diagnosis Orders   1.  Hypothyroidism, unspecified type T4, Free    TSH      2. Menopausal osteoporosis  Comprehensive Metabolic Panel, Fasting      3. Hyponatremia  Comprehensive Metabolic Panel, Fasting      4. Medication management  Comprehensive Metabolic Panel, Fasting      5. Severe episode of recurrent major depressive disorder, without psychotic features ([facility]      on meds      6. Chronic obstructive pulmonary disease, unspecified COPD type ([facility]        7. Alcoholism ([facility]        8. Need for vaccination  Influenza, FLUAD, (age 72 y+), IM, Preservative Free, 0.5 mL             Plan:      No follow-ups on file. Take the Celexa at night and see if she is less tired during the day. She will need a 's test written and road to see if she can have her license renewed  Call as needed. Orders Placed This Encounter   Procedures    Influenza, FLUAD, (age 72 y+), IM, Preservative Free, 0.5 mL    T4, Free     Standing Status:   Future     Standing Expiration Date:   1/19/2024    TSH     Standing Status:   Future     Standing Expiration Date:   1/19/2024    Comprehensive Metabolic Panel, Fasting     Standing Status:   Future     Standing Expiration Date:   1/19/2024     Orders Placed This Encounter   Medications    citalopram (CELEXA) 40 MG tablet     Sig: Take 1 tablet by mouth at bedtime     Dispense:  30 tablet     Refill:  5    albuterol sulfate HFA (PROVENTIL;VENTOLIN;PROAIR) 108 (90 Base) MCG/ACT inhaler     Sig: INHALE ONE TO TWO PUFFS BY MOUTH EVERY 4 HOURS AS NEEDED FOR WHEEZING OR SHORTNESS OF BREATH. SPACE OUT TO EVERY 6 HOURS AS SYMPTOMS IMPROVE     Dispense:  18 g     Refill:  2       Patient given educationalmaterials - see patient instructions. Discussed use, benefit, and side effectsof prescribed medications. All patient questions answered. Pt voiced understanding. Reviewed health maintenance. Instructed to continue current medications, diet andexercise. Patient agreed with treatment plan. Follow up as directed.      Electronicallysigned by Claudy Asher Carie Jones MD on 1/19/2023 at 9:47 AM

## 2023-01-20 ENCOUNTER — TELEPHONE (OUTPATIENT)
Dept: PRIMARY CARE CLINIC | Age: 78
End: 2023-01-20

## 2023-01-20 DIAGNOSIS — E03.9 HYPOTHYROIDISM, UNSPECIFIED TYPE: ICD-10-CM

## 2023-01-20 RX ORDER — LEVOTHYROXINE SODIUM 0.05 MG/1
50 TABLET ORAL
Qty: 77 TABLET | Refills: 3 | Status: SHIPPED | OUTPATIENT
Start: 2023-01-20

## 2023-01-20 NOTE — RESULT ENCOUNTER NOTE
Thyroid level is too high: need to decrease dose Take one tablet Mon through Friday skip Saturday and Sunday. Sodium a little below normal, add a LITTLE more salt to diet. Rest of labs ok.

## 2023-01-20 NOTE — TELEPHONE ENCOUNTER
Pt states that her  tested positive for covid and he is in the hosp. Not sure when he started with symptoms. Pt will be getting tested at [facility] and let us know results. Pt currently has a cough and runny nose. Using Mucinex. Covid instructions given.

## 2023-02-14 DIAGNOSIS — M19.90 ARTHRITIS: ICD-10-CM

## 2023-02-14 DIAGNOSIS — G89.29 OTHER CHRONIC PAIN: ICD-10-CM

## 2023-02-14 DIAGNOSIS — S32.000S COMPRESSION FRACTURE OF LUMBAR VERTEBRA, UNSPECIFIED LUMBAR VERTEBRAL LEVEL, SEQUELA: ICD-10-CM

## 2023-02-14 RX ORDER — MELOXICAM 15 MG/1
TABLET ORAL
Qty: 30 TABLET | Refills: 2 | Status: SHIPPED | OUTPATIENT
Start: 2023-02-14

## 2023-07-11 ENCOUNTER — TELEPHONE (OUTPATIENT)
Dept: PRIMARY CARE CLINIC | Age: 78
End: 2023-07-11

## (undated) DEVICE — ST CHARLES GEN LAPAROSCOPY PK: Brand: MEDLINE INDUSTRIES, INC.

## (undated) DEVICE — STRIP,CLOSURE,WOUND,MEDI-STRIP,1/2X4: Brand: MEDLINE

## (undated) DEVICE — DRAPE,THYROID,SOFT,STERILE: Brand: MEDLINE

## (undated) DEVICE — TROCARS: Brand: KII® BALLOON BLUNT TIP SYSTEM

## (undated) DEVICE — GLOVE ORTHO 7 1/2   MSG9475

## (undated) DEVICE — GOWN,AURORA,NONREINFORCED,LARGE: Brand: MEDLINE

## (undated) DEVICE — TROCAR: Brand: KII FIOS FIRST ENTRY

## (undated) DEVICE — SUTURE PDS II SZ 0 L27IN ABSRB VLT UR-6 L26MM 1/2 CIR D7185

## (undated) DEVICE — GLOVE ORTHO 8   MSG9480

## (undated) DEVICE — ADHESIVE SKIN CLOSURE TOP 36 CC HI VISC DERMBND MINI

## (undated) DEVICE — SYRINGE MED 20ML STD CLR PLAS LUERSLIP TIP N CTRL DISP

## (undated) DEVICE — BLADELESS OBTURATOR: Brand: WECK VISTA

## (undated) DEVICE — Device: Brand: MICROAIRE®

## (undated) DEVICE — K WIRE FIX L152MM DIA1.6MM S STL 2 TRCR PNT

## (undated) DEVICE — AIRLIFE™ NASAL OXYGEN CANNULA CURVED, FLARED TIP, WITH 7 FEET (2.1 M) CRUSH RESISTANT TUBING, OVER-THE-EAR STYLE: Brand: AIRLIFE™

## (undated) DEVICE — Device

## (undated) DEVICE — MERCY HEALTH ST CHARLES: Brand: MEDLINE INDUSTRIES, INC.

## (undated) DEVICE — SUTURE VCRL + SZ 3-0 L27IN ABSRB UD L26MM SH 1/2 CIR VCP416H

## (undated) DEVICE — SPLINT ORTH W3XL12IN LAYERED FBRGLS FOAM PD BRTH BK MOLD

## (undated) DEVICE — SUTURE VCRL SZ 3-0 L27IN ABSRB UD KS L60MM STR REV CUT NDL J663H

## (undated) DEVICE — SYRINGE IRRIG 60ML SFT PLIABLE BLB EZ TO GRP 1 HND USE W/

## (undated) DEVICE — SOLUTION IV IRRIG WATER 1000ML POUR BRL 2F7114

## (undated) DEVICE — ARM DRAPE

## (undated) DEVICE — GOWN,SIRUS,NONRNF,SETINSLV,XL,20/CS: Brand: MEDLINE

## (undated) DEVICE — SINGLE PORT MANIFOLD: Brand: NEPTUNE 2

## (undated) DEVICE — BITEBLOCK 54FR W/ DENT RIM BLOX

## (undated) DEVICE — GLOVE SURG SZ 8 L12IN FNGR THK79MIL GRN LTX FREE

## (undated) DEVICE — COVER,MAYO STAND,STERILE: Brand: MEDLINE

## (undated) DEVICE — POLYP TRAP: Brand: TRAPEASE®

## (undated) DEVICE — ST CHARLES MINOR ABDOMINAL PK: Brand: MEDLINE INDUSTRIES, INC.

## (undated) DEVICE — COVER,TABLE,60X90,STERILE: Brand: MEDLINE

## (undated) DEVICE — FORCEPS BX L240CM WRK CHN 2.8MM STD CAP W/ NDL MIC MESH

## (undated) DEVICE — SUTURE ETHLN SZ 3-0 L18IN NONABSORBABLE BLK FS-1 L24MM 3/8 663H

## (undated) DEVICE — CLIP INT M L POLYMER LOK LIG HEM O LOK

## (undated) DEVICE — BLANKET WRM W29.9XL79.1IN UP BODY FORC AIR MISTRAL-AIR

## (undated) DEVICE — COVER,MAYO STAND,XL,STERILE: Brand: MEDLINE

## (undated) DEVICE — DRESSING,GAUZE,XEROFORM,CURAD,5"X9",ST: Brand: CURAD

## (undated) DEVICE — SUTURE MCRYL + SZ 4-0 L27IN ABSRB UD L19MM PS-2 3/8 CIR MCP426H

## (undated) DEVICE — C-ARM: Brand: UNBRANDED

## (undated) DEVICE — SPONGE GZ W2XL2IN NONWOVEN 4 PLY FASTER WICKING ABIL AVANT

## (undated) DEVICE — PAD,NON-ADHERENT,3X8,STERILE,LF,1/PK: Brand: MEDLINE

## (undated) DEVICE — CANNULA SEAL

## (undated) DEVICE — SNARE ENDOSCP L240CM LOOP W13MM DIA2.4MM SHT THROW SM OVL